# Patient Record
Sex: FEMALE | Race: WHITE | Employment: FULL TIME | URBAN - METROPOLITAN AREA
[De-identification: names, ages, dates, MRNs, and addresses within clinical notes are randomized per-mention and may not be internally consistent; named-entity substitution may affect disease eponyms.]

---

## 2017-01-19 ENCOUNTER — ANESTHESIA EVENT (OUTPATIENT)
Dept: OPERATING ROOM | Age: 72
End: 2017-01-19
Payer: COMMERCIAL

## 2017-01-20 ENCOUNTER — HOSPITAL ENCOUNTER (OUTPATIENT)
Dept: PREADMISSION TESTING | Age: 72
Discharge: HOME | End: 2017-01-20
Payer: COMMERCIAL

## 2017-01-20 VITALS
BODY MASS INDEX: 22.2 KG/M2 | DIASTOLIC BLOOD PRESSURE: 63 MMHG | OXYGEN SATURATION: 98 % | SYSTOLIC BLOOD PRESSURE: 113 MMHG | TEMPERATURE: 99.4 F | RESPIRATION RATE: 16 BRPM | HEIGHT: 64 IN | HEART RATE: 70 BPM | WEIGHT: 130 LBS

## 2017-01-20 DIAGNOSIS — M20.42 HAMMER TOE OF LEFT FOOT: Chronic | ICD-10-CM

## 2017-01-20 PROBLEM — J44.9 COPD (CHRONIC OBSTRUCTIVE PULMONARY DISEASE) (HCC): Chronic | Status: ACTIVE | Noted: 2017-01-20

## 2017-01-20 PROBLEM — M20.40 HAMMERTOE: Chronic | Status: ACTIVE | Noted: 2017-01-20

## 2017-01-20 PROCEDURE — 93005 ELECTROCARDIOGRAM TRACING: CPT

## 2017-01-20 RX ORDER — SODIUM CHLORIDE 0.9 % (FLUSH) 0.9 %
10 SYRINGE (ML) INJECTION PRN
Status: CANCELLED | OUTPATIENT
Start: 2017-01-20

## 2017-01-20 RX ORDER — SODIUM CHLORIDE, SODIUM LACTATE, POTASSIUM CHLORIDE, CALCIUM CHLORIDE 600; 310; 30; 20 MG/100ML; MG/100ML; MG/100ML; MG/100ML
INJECTION, SOLUTION INTRAVENOUS CONTINUOUS
Status: CANCELLED | OUTPATIENT
Start: 2017-01-20

## 2017-01-20 RX ORDER — ALBUTEROL SULFATE 90 UG/1
2 AEROSOL, METERED RESPIRATORY (INHALATION) EVERY 6 HOURS PRN
COMMUNITY

## 2017-01-20 RX ORDER — SENNOSIDES 8.6 MG
650 CAPSULE ORAL EVERY 8 HOURS PRN
COMMUNITY

## 2017-01-20 RX ORDER — LIDOCAINE HYDROCHLORIDE 10 MG/ML
1 INJECTION, SOLUTION EPIDURAL; INFILTRATION; INTRACAUDAL; PERINEURAL
Status: CANCELLED | OUTPATIENT
Start: 2017-01-20 | End: 2017-01-20

## 2017-01-20 RX ORDER — AMLODIPINE BESYLATE 5 MG/1
5 TABLET ORAL
COMMUNITY

## 2017-01-20 RX ORDER — ALPRAZOLAM 0.5 MG/1
0.5 TABLET ORAL DAILY
COMMUNITY

## 2017-01-20 RX ORDER — ATORVASTATIN CALCIUM 10 MG/1
10 TABLET, FILM COATED ORAL
COMMUNITY

## 2017-01-20 RX ORDER — RANITIDINE 300 MG/1
300 CAPSULE ORAL EVERY EVENING
COMMUNITY

## 2017-01-20 RX ORDER — PANTOPRAZOLE SODIUM 40 MG/10ML
40 INJECTION, POWDER, LYOPHILIZED, FOR SOLUTION INTRAVENOUS
COMMUNITY
End: 2017-01-20

## 2017-01-20 RX ORDER — SODIUM CHLORIDE 0.9 % (FLUSH) 0.9 %
10 SYRINGE (ML) INJECTION EVERY 12 HOURS SCHEDULED
Status: CANCELLED | OUTPATIENT
Start: 2017-01-20

## 2017-01-20 ASSESSMENT — ENCOUNTER SYMPTOMS
CONSTIPATION: 0
DIARRHEA: 0
SHORTNESS OF BREATH: 1
BACK PAIN: 0
SORE THROAT: 0
NAUSEA: 0
VOMITING: 0
COUGH: 0
HEARTBURN: 1
BLURRED VISION: 0
DOUBLE VISION: 0
WHEEZING: 0

## 2017-01-25 LAB
EKG ATRIAL RATE: 69 BPM
EKG P AXIS: 73 DEGREES
EKG P-R INTERVAL: 152 MS
EKG Q-T INTERVAL: 432 MS
EKG QRS DURATION: 76 MS
EKG QTC CALCULATION (BAZETT): 462 MS
EKG R AXIS: 3 DEGREES
EKG T AXIS: 41 DEGREES
EKG VENTRICULAR RATE: 69 BPM

## 2017-01-27 ENCOUNTER — ANESTHESIA (OUTPATIENT)
Dept: OPERATING ROOM | Age: 72
End: 2017-01-27
Payer: COMMERCIAL

## 2017-01-27 ENCOUNTER — HOSPITAL ENCOUNTER (OUTPATIENT)
Age: 72
Setting detail: OUTPATIENT SURGERY
Discharge: HOME | End: 2017-01-27
Attending: PODIATRIST | Admitting: PODIATRIST
Payer: COMMERCIAL

## 2017-01-27 VITALS
DIASTOLIC BLOOD PRESSURE: 76 MMHG | TEMPERATURE: 98.1 F | RESPIRATION RATE: 16 BRPM | SYSTOLIC BLOOD PRESSURE: 142 MMHG | OXYGEN SATURATION: 95 % | HEART RATE: 64 BPM

## 2017-01-27 VITALS
RESPIRATION RATE: 14 BRPM | OXYGEN SATURATION: 100 % | SYSTOLIC BLOOD PRESSURE: 84 MMHG | DIASTOLIC BLOOD PRESSURE: 52 MMHG

## 2017-01-27 PROCEDURE — 3600000014 HC SURGERY LEVEL 4 ADDTL 15MIN: Performed by: PODIATRIST

## 2017-01-27 PROCEDURE — 7100000001 HC PACU RECOVERY - ADDTL 15 MIN: Performed by: PODIATRIST

## 2017-01-27 PROCEDURE — 3700000001 HC ADD 15 MINUTES (ANESTHESIA): Performed by: PODIATRIST

## 2017-01-27 PROCEDURE — 3600000004 HC SURGERY LEVEL 4 BASE: Performed by: PODIATRIST

## 2017-01-27 PROCEDURE — 6360000002 HC RX W HCPCS: Performed by: PODIATRIST

## 2017-01-27 PROCEDURE — 7100000000 HC PACU RECOVERY - FIRST 15 MIN: Performed by: PODIATRIST

## 2017-01-27 PROCEDURE — 2580000003 HC RX 258: Performed by: PODIATRIST

## 2017-01-27 PROCEDURE — 7100000011 HC PHASE II RECOVERY - ADDTL 15 MIN: Performed by: PODIATRIST

## 2017-01-27 PROCEDURE — 7100000010 HC PHASE II RECOVERY - FIRST 15 MIN: Performed by: PODIATRIST

## 2017-01-27 PROCEDURE — 3700000000 HC ANESTHESIA ATTENDED CARE: Performed by: PODIATRIST

## 2017-01-27 PROCEDURE — 2500000003 HC RX 250 WO HCPCS: Performed by: PODIATRIST

## 2017-01-27 PROCEDURE — 6360000002 HC RX W HCPCS: Performed by: ANESTHESIOLOGY

## 2017-01-27 RX ORDER — FENTANYL CITRATE 50 UG/ML
50 INJECTION, SOLUTION INTRAMUSCULAR; INTRAVENOUS EVERY 10 MIN PRN
Status: DISCONTINUED | OUTPATIENT
Start: 2017-01-27 | End: 2017-01-27 | Stop reason: HOSPADM

## 2017-01-27 RX ORDER — ONDANSETRON 2 MG/ML
4 INJECTION INTRAMUSCULAR; INTRAVENOUS EVERY 6 HOURS PRN
Status: DISCONTINUED | OUTPATIENT
Start: 2017-01-27 | End: 2017-01-27 | Stop reason: HOSPADM

## 2017-01-27 RX ORDER — HYDROCODONE BITARTRATE AND ACETAMINOPHEN 5; 325 MG/1; MG/1
1 TABLET ORAL PRN
Status: DISCONTINUED | OUTPATIENT
Start: 2017-01-27 | End: 2017-01-27 | Stop reason: HOSPADM

## 2017-01-27 RX ORDER — LIDOCAINE HYDROCHLORIDE 10 MG/ML
1 INJECTION, SOLUTION EPIDURAL; INFILTRATION; INTRACAUDAL; PERINEURAL
Status: DISCONTINUED | OUTPATIENT
Start: 2017-01-27 | End: 2017-01-27 | Stop reason: HOSPADM

## 2017-01-27 RX ORDER — MAGNESIUM HYDROXIDE 1200 MG/15ML
LIQUID ORAL CONTINUOUS PRN
Status: DISCONTINUED | OUTPATIENT
Start: 2017-01-27 | End: 2017-01-27 | Stop reason: HOSPADM

## 2017-01-27 RX ORDER — SODIUM CHLORIDE 0.9 % (FLUSH) 0.9 %
10 SYRINGE (ML) INJECTION EVERY 12 HOURS SCHEDULED
Status: DISCONTINUED | OUTPATIENT
Start: 2017-01-27 | End: 2017-01-27 | Stop reason: HOSPADM

## 2017-01-27 RX ORDER — LIDOCAINE HYDROCHLORIDE 10 MG/ML
INJECTION, SOLUTION EPIDURAL; INFILTRATION; INTRACAUDAL; PERINEURAL PRN
Status: DISCONTINUED | OUTPATIENT
Start: 2017-01-27 | End: 2017-01-27 | Stop reason: HOSPADM

## 2017-01-27 RX ORDER — BUPIVACAINE HYDROCHLORIDE 5 MG/ML
INJECTION, SOLUTION EPIDURAL; INTRACAUDAL PRN
Status: DISCONTINUED | OUTPATIENT
Start: 2017-01-27 | End: 2017-01-27 | Stop reason: HOSPADM

## 2017-01-27 RX ORDER — HYDROCODONE BITARTRATE AND ACETAMINOPHEN 5; 325 MG/1; MG/1
1 TABLET ORAL EVERY 6 HOURS PRN
Qty: 30 TABLET | Refills: 0 | Status: SHIPPED | OUTPATIENT
Start: 2017-01-27 | End: 2017-02-03

## 2017-01-27 RX ORDER — SODIUM CHLORIDE 0.9 % (FLUSH) 0.9 %
10 SYRINGE (ML) INJECTION PRN
Status: DISCONTINUED | OUTPATIENT
Start: 2017-01-27 | End: 2017-01-27 | Stop reason: HOSPADM

## 2017-01-27 RX ORDER — MIDAZOLAM HYDROCHLORIDE 1 MG/ML
INJECTION INTRAMUSCULAR; INTRAVENOUS PRN
Status: DISCONTINUED | OUTPATIENT
Start: 2017-01-27 | End: 2017-01-27 | Stop reason: SDUPTHER

## 2017-01-27 RX ORDER — DIPHENHYDRAMINE HYDROCHLORIDE 50 MG/ML
12.5 INJECTION INTRAMUSCULAR; INTRAVENOUS
Status: DISCONTINUED | OUTPATIENT
Start: 2017-01-27 | End: 2017-01-27 | Stop reason: HOSPADM

## 2017-01-27 RX ORDER — PROPOFOL 10 MG/ML
INJECTION, EMULSION INTRAVENOUS CONTINUOUS PRN
Status: DISCONTINUED | OUTPATIENT
Start: 2017-01-27 | End: 2017-01-27 | Stop reason: SDUPTHER

## 2017-01-27 RX ORDER — MEPERIDINE HYDROCHLORIDE 50 MG/ML
12.5 INJECTION INTRAMUSCULAR; INTRAVENOUS; SUBCUTANEOUS EVERY 5 MIN PRN
Status: DISCONTINUED | OUTPATIENT
Start: 2017-01-27 | End: 2017-01-27 | Stop reason: HOSPADM

## 2017-01-27 RX ORDER — ACETAMINOPHEN 325 MG/1
650 TABLET ORAL EVERY 4 HOURS PRN
Status: DISCONTINUED | OUTPATIENT
Start: 2017-01-27 | End: 2017-01-27 | Stop reason: HOSPADM

## 2017-01-27 RX ORDER — HYDROMORPHONE HCL 110MG/55ML
0.5 PATIENT CONTROLLED ANALGESIA SYRINGE INTRAVENOUS EVERY 10 MIN PRN
Status: DISCONTINUED | OUTPATIENT
Start: 2017-01-27 | End: 2017-01-27 | Stop reason: HOSPADM

## 2017-01-27 RX ORDER — SODIUM CHLORIDE, SODIUM LACTATE, POTASSIUM CHLORIDE, CALCIUM CHLORIDE 600; 310; 30; 20 MG/100ML; MG/100ML; MG/100ML; MG/100ML
INJECTION, SOLUTION INTRAVENOUS CONTINUOUS
Status: DISCONTINUED | OUTPATIENT
Start: 2017-01-27 | End: 2017-01-27 | Stop reason: HOSPADM

## 2017-01-27 RX ORDER — METOCLOPRAMIDE HYDROCHLORIDE 5 MG/ML
10 INJECTION INTRAMUSCULAR; INTRAVENOUS
Status: DISCONTINUED | OUTPATIENT
Start: 2017-01-27 | End: 2017-01-27 | Stop reason: HOSPADM

## 2017-01-27 RX ORDER — ONDANSETRON 2 MG/ML
4 INJECTION INTRAMUSCULAR; INTRAVENOUS
Status: DISCONTINUED | OUTPATIENT
Start: 2017-01-27 | End: 2017-01-27 | Stop reason: HOSPADM

## 2017-01-27 RX ORDER — HYDROCODONE BITARTRATE AND ACETAMINOPHEN 5; 325 MG/1; MG/1
2 TABLET ORAL PRN
Status: DISCONTINUED | OUTPATIENT
Start: 2017-01-27 | End: 2017-01-27 | Stop reason: HOSPADM

## 2017-01-27 RX ORDER — DOCUSATE SODIUM 100 MG/1
100 CAPSULE, LIQUID FILLED ORAL 2 TIMES DAILY
Status: DISCONTINUED | OUTPATIENT
Start: 2017-01-27 | End: 2017-01-27 | Stop reason: HOSPADM

## 2017-01-27 RX ADMIN — FENTANYL CITRATE 25 MCG: 50 INJECTION, SOLUTION INTRAMUSCULAR; INTRAVENOUS at 07:34

## 2017-01-27 RX ADMIN — CEFAZOLIN 1 G: 1 INJECTION, POWDER, FOR SOLUTION INTRAMUSCULAR; INTRAVENOUS at 07:39

## 2017-01-27 RX ADMIN — PROPOFOL 100 MCG/KG/MIN: 10 INJECTION, EMULSION INTRAVENOUS at 07:34

## 2017-01-27 RX ADMIN — MIDAZOLAM HYDROCHLORIDE 2 MG: 1 INJECTION, SOLUTION INTRAMUSCULAR; INTRAVENOUS at 07:30

## 2019-12-30 LAB — SURGICAL PATHOLOGY REPORT: NORMAL

## 2023-10-19 DIAGNOSIS — M50.30 DEGENERATION, INTERVERTEBRAL DISC, CERVICAL: ICD-10-CM

## 2023-10-19 RX ORDER — ALENDRONATE SODIUM 70 MG/1
70 TABLET ORAL
COMMUNITY
Start: 2022-05-22 | End: 2023-10-19 | Stop reason: SDUPTHER

## 2023-10-19 NOTE — TELEPHONE ENCOUNTER
Patient request     Last ov 6/23     Please call and schedule pending appointment. Then, send to [facility] for approval

## 2023-10-20 RX ORDER — ALENDRONATE SODIUM 70 MG/1
70 TABLET ORAL
Qty: 12 TABLET | Refills: 1 | Status: SHIPPED | OUTPATIENT
Start: 2023-10-20 | End: 2023-10-25 | Stop reason: SDUPTHER

## 2023-10-23 PROBLEM — E87.6 HYPOKALEMIA: Status: ACTIVE | Noted: 2023-10-23

## 2023-10-23 PROBLEM — Z95.818 STATUS POST PLACEMENT OF IMPLANTABLE LOOP RECORDER: Status: ACTIVE | Noted: 2023-10-23

## 2023-10-23 PROBLEM — M51.36 LUMBAR DEGENERATIVE DISC DISEASE: Status: ACTIVE | Noted: 2023-10-23

## 2023-10-23 PROBLEM — F03.90 DEMENTIA (MULTI): Status: ACTIVE | Noted: 2023-10-23

## 2023-10-23 PROBLEM — I49.5 TACHYCARDIA-BRADYCARDIA SYNDROME (MULTI): Status: ACTIVE | Noted: 2023-10-23

## 2023-10-23 PROBLEM — E78.2 MIXED HYPERLIPIDEMIA: Status: ACTIVE | Noted: 2023-10-23

## 2023-10-23 PROBLEM — I10 BENIGN ESSENTIAL HYPERTENSION: Status: ACTIVE | Noted: 2023-10-23

## 2023-10-23 PROBLEM — R05.3 COUGH, PERSISTENT: Status: ACTIVE | Noted: 2023-10-23

## 2023-10-23 PROBLEM — M51.369 LUMBAR DEGENERATIVE DISC DISEASE: Status: ACTIVE | Noted: 2023-10-23

## 2023-10-23 PROBLEM — G45.9 TIA (TRANSIENT ISCHEMIC ATTACK): Status: ACTIVE | Noted: 2023-10-23

## 2023-10-23 PROBLEM — J44.9 COPD (CHRONIC OBSTRUCTIVE PULMONARY DISEASE) (MULTI): Status: ACTIVE | Noted: 2023-10-23

## 2023-10-23 PROBLEM — I48.0 PAROXYSMAL ATRIAL FIBRILLATION (MULTI): Status: ACTIVE | Noted: 2023-10-23

## 2023-10-23 PROBLEM — N32.81 OAB (OVERACTIVE BLADDER): Status: ACTIVE | Noted: 2023-10-23

## 2023-10-23 PROBLEM — G57.00 SCIATIC NEUROPATHY: Status: ACTIVE | Noted: 2023-10-23

## 2023-10-23 PROBLEM — K22.70 BARRETT ESOPHAGUS: Status: ACTIVE | Noted: 2023-10-23

## 2023-10-23 PROBLEM — J40 BRONCHITIS: Status: ACTIVE | Noted: 2023-10-23

## 2023-10-23 PROBLEM — M81.0 SENILE OSTEOPOROSIS: Status: ACTIVE | Noted: 2023-10-23

## 2023-10-23 PROBLEM — I25.10 CAD (CORONARY ARTERY DISEASE): Status: ACTIVE | Noted: 2023-10-23

## 2023-10-23 PROBLEM — I67.1 INTRACRANIAL ANEURYSM (HHS-HCC): Status: ACTIVE | Noted: 2023-10-23

## 2023-10-23 PROBLEM — R09.02 HYPOXIA: Status: ACTIVE | Noted: 2023-10-23

## 2023-10-23 PROBLEM — E55.9 VITAMIN D INSUFFICIENCY: Status: ACTIVE | Noted: 2023-10-23

## 2023-10-23 RX ORDER — AMOXICILLIN AND CLAVULANATE POTASSIUM 500; 125 MG/1; MG/1
500 TABLET, FILM COATED ORAL 2 TIMES DAILY
COMMUNITY
Start: 2023-07-25 | End: 2023-10-25 | Stop reason: WASHOUT

## 2023-10-23 RX ORDER — DEXTROMETHORPHAN HYDROBROMIDE, GUAIFENESIN 5; 100 MG/5ML; MG/5ML
2 LIQUID ORAL DAILY
COMMUNITY

## 2023-10-23 RX ORDER — ATORVASTATIN CALCIUM 40 MG/1
1 TABLET, FILM COATED ORAL DAILY
COMMUNITY
Start: 2021-07-19 | End: 2023-11-08 | Stop reason: SDUPTHER

## 2023-10-23 RX ORDER — ASPIRIN 81 MG/1
1 TABLET ORAL DAILY
COMMUNITY

## 2023-10-23 RX ORDER — IPRATROPIUM BROMIDE AND ALBUTEROL SULFATE 2.5; .5 MG/3ML; MG/3ML
3 SOLUTION RESPIRATORY (INHALATION) DAILY PRN
COMMUNITY

## 2023-10-23 RX ORDER — METOPROLOL TARTRATE 50 MG/1
25 TABLET ORAL 2 TIMES DAILY
COMMUNITY
Start: 2023-08-07 | End: 2024-04-10 | Stop reason: SDUPTHER

## 2023-10-23 RX ORDER — ISOSORBIDE MONONITRATE 30 MG/1
1 TABLET, EXTENDED RELEASE ORAL DAILY
COMMUNITY
Start: 2022-01-27

## 2023-10-23 RX ORDER — HYDROCHLOROTHIAZIDE 12.5 MG/1
1 CAPSULE ORAL DAILY
COMMUNITY

## 2023-10-23 RX ORDER — LANOLIN ALCOHOL/MO/W.PET/CERES
1 CREAM (GRAM) TOPICAL DAILY
COMMUNITY
Start: 2021-11-23

## 2023-10-23 RX ORDER — POTASSIUM CHLORIDE 750 MG/1
1 TABLET, EXTENDED RELEASE ORAL DAILY
COMMUNITY
Start: 2022-09-11

## 2023-10-23 RX ORDER — NITROGLYCERIN 0.4 MG/1
0.4 TABLET SUBLINGUAL EVERY 5 MIN PRN
COMMUNITY

## 2023-10-25 ENCOUNTER — OFFICE VISIT (OUTPATIENT)
Dept: PRIMARY CARE | Facility: CLINIC | Age: 78
End: 2023-10-25
Payer: MEDICARE

## 2023-10-25 VITALS
SYSTOLIC BLOOD PRESSURE: 134 MMHG | DIASTOLIC BLOOD PRESSURE: 84 MMHG | TEMPERATURE: 98.1 F | HEART RATE: 76 BPM | BODY MASS INDEX: 32.8 KG/M2 | HEIGHT: 62 IN | WEIGHT: 178.25 LBS

## 2023-10-25 DIAGNOSIS — J43.9 PULMONARY EMPHYSEMA, UNSPECIFIED EMPHYSEMA TYPE (MULTI): Primary | ICD-10-CM

## 2023-10-25 DIAGNOSIS — Z23 ENCOUNTER FOR IMMUNIZATION: ICD-10-CM

## 2023-10-25 DIAGNOSIS — M81.0 SENILE OSTEOPOROSIS: ICD-10-CM

## 2023-10-25 DIAGNOSIS — M50.30 DEGENERATION, INTERVERTEBRAL DISC, CERVICAL: ICD-10-CM

## 2023-10-25 PROCEDURE — 99214 OFFICE O/P EST MOD 30 MIN: CPT | Performed by: INTERNAL MEDICINE

## 2023-10-25 PROCEDURE — 3075F SYST BP GE 130 - 139MM HG: CPT | Performed by: INTERNAL MEDICINE

## 2023-10-25 PROCEDURE — 1159F MED LIST DOCD IN RCRD: CPT | Performed by: INTERNAL MEDICINE

## 2023-10-25 PROCEDURE — 90662 IIV NO PRSV INCREASED AG IM: CPT | Performed by: INTERNAL MEDICINE

## 2023-10-25 PROCEDURE — G0008 ADMIN INFLUENZA VIRUS VAC: HCPCS | Performed by: INTERNAL MEDICINE

## 2023-10-25 PROCEDURE — 3079F DIAST BP 80-89 MM HG: CPT | Performed by: INTERNAL MEDICINE

## 2023-10-25 PROCEDURE — 1126F AMNT PAIN NOTED NONE PRSNT: CPT | Performed by: INTERNAL MEDICINE

## 2023-10-25 PROCEDURE — 1036F TOBACCO NON-USER: CPT | Performed by: INTERNAL MEDICINE

## 2023-10-25 RX ORDER — AZITHROMYCIN 500 MG/1
500 TABLET, FILM COATED ORAL DAILY
Qty: 5 TABLET | Refills: 0 | Status: SHIPPED | OUTPATIENT
Start: 2023-10-25 | End: 2023-10-30

## 2023-10-25 RX ORDER — METHYLPREDNISOLONE 4 MG/1
TABLET ORAL
Qty: 21 TABLET | Refills: 0 | Status: SHIPPED | OUTPATIENT
Start: 2023-10-25 | End: 2024-01-08 | Stop reason: WASHOUT

## 2023-10-25 RX ORDER — ALENDRONATE SODIUM 70 MG/1
70 TABLET ORAL
Qty: 12 TABLET | Refills: 1 | Status: SHIPPED | OUTPATIENT
Start: 2023-10-25 | End: 2023-12-15 | Stop reason: SDUPTHER

## 2023-10-25 ASSESSMENT — ENCOUNTER SYMPTOMS
STRIDOR: 0
EYE REDNESS: 0
JOINT SWELLING: 0
FEVER: 0
PALPITATIONS: 0
HEMATURIA: 0
CHEST TIGHTNESS: 0
SPEECH DIFFICULTY: 0
BLOOD IN STOOL: 0
ACTIVITY CHANGE: 0
LIGHT-HEADEDNESS: 0

## 2023-10-25 ASSESSMENT — PATIENT HEALTH QUESTIONNAIRE - PHQ9
1. LITTLE INTEREST OR PLEASURE IN DOING THINGS: NOT AT ALL
SUM OF ALL RESPONSES TO PHQ9 QUESTIONS 1 AND 2: 0
2. FEELING DOWN, DEPRESSED OR HOPELESS: NOT AT ALL

## 2023-10-25 NOTE — PROGRESS NOTES
"In SUBJECTIVE:   Rae Lundy is a 78 y.o. female who presents for Med Management (Patient in office today for med management. ).    HISTORY OF PRESENT ILLNESS:  Rae Lundy  is a pleasant 78-year-old female with history of former smoking, COPD comes here with cough, congestion also somewhat wheezing.  She has been doing her home as well as neb treatment routinely twice a day.  She denies any fever or chills.  Recently her  has been sick who has been transferred to the hospital.  Otherwise she denies any chest pain or pressure.  Patient does have history of osteoporosis.  She needs her medications refilled.  Her cough does not make any sputum.  Patient had multiple previous hospitalization is prolonged cough and COPD exacerbation.  She is due for annual flu shot.            Review of Systems   Constitutional:  Negative for activity change and fever.   HENT:  Negative for hearing loss, nosebleeds and tinnitus.    Eyes:  Negative for redness.   Respiratory:  Negative for chest tightness and stridor.    Cardiovascular:  Negative for chest pain, palpitations and leg swelling.   Gastrointestinal:  Negative for blood in stool.   Endocrine: Negative for cold intolerance.   Genitourinary:  Negative for hematuria.   Musculoskeletal:  Negative for joint swelling.   Skin:  Negative for rash.   Neurological:  Negative for speech difficulty and light-headedness.   Psychiatric/Behavioral:  Negative for behavioral problems.        Visit Vitals  /84 (BP Location: Left arm, Patient Position: Sitting)   Pulse 76   Temp 36.7 °C (98.1 °F) (Temporal)   Ht 1.575 m (5' 2\")   Wt 80.9 kg (178 lb 4 oz)   BMI 32.60 kg/m²   Smoking Status Former   BSA 1.88 m²             Wt Readings from Last 10 Encounters:   10/25/23 80.9 kg (178 lb 4 oz)   07/25/23 79.4 kg (175 lb)   06/01/23 75.3 kg (166 lb)   03/07/23 75.8 kg (167 lb)   03/02/23 75.8 kg (167 lb)   02/16/23 72.4 kg (159 lb 9.6 oz)   10/12/22 73.5 kg (162 lb)   09/07/22 72.1 " kg (159 lb)   03/04/22 74.4 kg (164 lb)   12/16/21 72.1 kg (159 lb)            Physical Exam  Constitutional:       General: She is not in acute distress.     Appearance: Normal appearance.   HENT:      Head: Normocephalic.      Right Ear: Tympanic membrane normal.      Left Ear: Tympanic membrane normal.      Mouth/Throat:      Mouth: Mucous membranes are moist.   Cardiovascular:      Rate and Rhythm: Normal rate and regular rhythm.      Heart sounds: No murmur heard.  Pulmonary:      Effort: No respiratory distress.   Abdominal:      Palpations: Abdomen is soft.   Musculoskeletal:      Cervical back: Neck supple.      Right lower leg: No edema.      Left lower leg: No edema.   Skin:     Findings: No rash.   Neurological:      General: No focal deficit present.      Mental Status: She is alert and oriented to person, place, and time.   Psychiatric:         Mood and Affect: Mood normal.            RECENT LABS:  Lab Results   Component Value Date    WBC 12.1 (H) 05/23/2023    HGB 12.4 05/23/2023    HCT 38.0 05/23/2023     05/23/2023    CHOL 143 11/04/2022    TRIG 97 11/04/2022    HDL 56.0 11/04/2022    ALT 19 05/21/2023    AST 22 05/21/2023     (L) 05/24/2023    K 3.9 05/24/2023    CL 98 05/24/2023    CREATININE 0.71 05/24/2023    BUN 19 05/24/2023    CO2 27 05/24/2023    TSH 2.63 11/04/2022    INR 1.0 02/04/2023    HGBA1C 6.4 (H) 02/23/2022       MEDICATIONS:   Current Outpatient Medications   Medication Instructions    acetaminophen (Tylenol 8 HOUR) 650 mg ER tablet 2 tablets, oral, Daily    alendronate (FOSAMAX) 70 mg, oral, Weekly    aspirin 81 mg EC tablet 1 tablet, oral, Daily    atorvastatin (Lipitor) 40 mg tablet 1 tablet, oral, Daily    azithromycin (ZITHROMAX) 500 mg, oral, Daily    hydroCHLOROthiazide (Microzide) 12.5 mg capsule 1 capsule, oral, Daily    ipratropium-albuteroL (Duo-Neb) 0.5-2.5 mg/3 mL nebulizer solution 3 mL, inhalation, 3 times daily RT    isosorbide mononitrate ER (Imdur) 30  mg 24 hr tablet 1 tablet, oral, Daily    magnesium oxide (Mag-Ox) 400 mg (241.3 mg magnesium) tablet 1 tablet, oral, Daily    methylPREDNISolone (Medrol Dospak) 4 mg tablets Take as directed on package.    metoprolol tartrate (LOPRESSOR) 25 mg, oral, 2 times daily    multivit-min/ferrous fumarate (MULTI VITAMIN ORAL) 1 tablet, oral, Daily    nitroglycerin (NITROSTAT) 0.4 mg, sublingual, Every 5 min PRN    oxygen (O2) 3 L/min, inhalation, Nightly    potassium chloride CR 10 mEq ER tablet 1 tablet, oral, Daily        TODAY'S VISIT  DX:   1. Pulmonary emphysema, unspecified emphysema type (CMS/HCC)  azithromycin (Zithromax) 500 mg tablet    methylPREDNISolone (Medrol Dospak) 4 mg tablets      2. Encounter for immunization  Flu vaccine, quadrivalent, high-dose, preservative free, age 65y+ (FLUZONE)      3. Degeneration, intervertebral disc, cervical  alendronate (Fosamax) 70 mg tablet      4. Senile osteoporosis  alendronate (Fosamax) 70 mg tablet           MEDICAL DECISION MAKING:  A. Recent lab work and relevant imaging studies have been reviewed.    B. Relevant correspondence/notes from specialty consultants were reviewed and discussed with patient.    C. The current active medical co morbidities have been considered.   D. Medications have been sent for refill.  I started her on Medrol Dosepak and azithromycin.  E. Patient will continue with current medical therapy and plan.   F. I will see patient back in 3 months.

## 2023-11-08 DIAGNOSIS — E78.2 MIXED HYPERLIPIDEMIA: Primary | ICD-10-CM

## 2023-11-08 RX ORDER — ATORVASTATIN CALCIUM 40 MG/1
40 TABLET, FILM COATED ORAL DAILY
Qty: 90 TABLET | Refills: 1 | Status: SHIPPED | OUTPATIENT
Start: 2023-11-08 | End: 2024-02-06 | Stop reason: HOSPADM

## 2023-11-16 ENCOUNTER — HOSPITAL ENCOUNTER (EMERGENCY)
Facility: HOSPITAL | Age: 78
Discharge: HOME | End: 2023-11-16
Payer: MEDICARE

## 2023-11-16 ENCOUNTER — APPOINTMENT (OUTPATIENT)
Dept: RADIOLOGY | Facility: HOSPITAL | Age: 78
End: 2023-11-16
Payer: MEDICARE

## 2023-11-16 VITALS
BODY MASS INDEX: 25.59 KG/M2 | TEMPERATURE: 96.8 F | RESPIRATION RATE: 16 BRPM | DIASTOLIC BLOOD PRESSURE: 104 MMHG | OXYGEN SATURATION: 93 % | SYSTOLIC BLOOD PRESSURE: 144 MMHG | HEIGHT: 64 IN | WEIGHT: 149.91 LBS | HEART RATE: 78 BPM
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
## 2023-11-16 DIAGNOSIS — M25.561 ACUTE PAIN OF RIGHT KNEE: Primary | ICD-10-CM

## 2023-11-16 PROCEDURE — 99285 EMERGENCY DEPT VISIT HI MDM: CPT | Mod: 25

## 2023-11-16 PROCEDURE — 73564 X-RAY EXAM KNEE 4 OR MORE: CPT | Mod: RIGHT SIDE | Performed by: RADIOLOGY

## 2023-11-16 PROCEDURE — 99283 EMERGENCY DEPT VISIT LOW MDM: CPT | Mod: 25

## 2023-11-16 PROCEDURE — 73564 X-RAY EXAM KNEE 4 OR MORE: CPT | Mod: RT

## 2023-11-16 RX ORDER — IBUPROFEN 600 MG/1
600 TABLET ORAL EVERY 6 HOURS PRN
Qty: 20 TABLET | Refills: 0 | Status: SHIPPED | OUTPATIENT
Start: 2023-11-16 | End: 2024-03-24 | Stop reason: HOSPADM

## 2023-11-16 RX ORDER — ACETAMINOPHEN 325 MG/1
650 TABLET ORAL ONCE
Status: COMPLETED | OUTPATIENT
Start: 2023-11-16 | End: 2023-11-16

## 2023-11-16 RX ADMIN — ACETAMINOPHEN 650 MG: 325 TABLET ORAL at 13:21

## 2023-11-16 ASSESSMENT — LIFESTYLE VARIABLES
EVER FELT BAD OR GUILTY ABOUT YOUR DRINKING: NO
HAVE PEOPLE ANNOYED YOU BY CRITICIZING YOUR DRINKING: NO
EVER HAD A DRINK FIRST THING IN THE MORNING TO STEADY YOUR NERVES TO GET RID OF A HANGOVER: NO
HAVE YOU EVER FELT YOU SHOULD CUT DOWN ON YOUR DRINKING: NO
REASON UNABLE TO ASSESS: NO

## 2023-11-16 ASSESSMENT — COLUMBIA-SUICIDE SEVERITY RATING SCALE - C-SSRS
6. HAVE YOU EVER DONE ANYTHING, STARTED TO DO ANYTHING, OR PREPARED TO DO ANYTHING TO END YOUR LIFE?: NO
2. HAVE YOU ACTUALLY HAD ANY THOUGHTS OF KILLING YOURSELF?: NO
1. IN THE PAST MONTH, HAVE YOU WISHED YOU WERE DEAD OR WISHED YOU COULD GO TO SLEEP AND NOT WAKE UP?: NO

## 2023-11-16 ASSESSMENT — PAIN DESCRIPTION - DESCRIPTORS: DESCRIPTORS: ACHING;THROBBING

## 2023-11-16 ASSESSMENT — PAIN SCALES - GENERAL
PAINLEVEL_OUTOF10: 9
PAINLEVEL_OUTOF10: 3
PAINLEVEL_OUTOF10: 6

## 2023-11-16 ASSESSMENT — PAIN - FUNCTIONAL ASSESSMENT
PAIN_FUNCTIONAL_ASSESSMENT: 0-10
PAIN_FUNCTIONAL_ASSESSMENT: 0-10

## 2023-11-16 NOTE — ED PROVIDER NOTES
HPI   Chief Complaint   Patient presents with    Knee Pain     Fell this morning and hit right knee on kitchen chair       78-year-old female presents emergency department for evaluation of right knee pain.  Patient tells me that she tripped over a throw rug this morning and landed on her right knee.  Patient tells me that she does need a knee replacement in this knee.  Patient tells me that she is having 8/10 pain with ambulation.  Patient characterizes the pain as sharp.  Patient tells me that she did drive herself today.  Patient denies taking any over-the-counter medications for pain relief prior to arrival.  Patient tells me that she did put on a knee brace.  Patient denies any chest pain, dizziness or lightheadedness, shortness of breath, fever chills, abdominal pain, nausea vomiting, headache, urinary symptoms, or weakness.      History provided by:  Patient   used: No                        No data recorded                Patient History   Past Medical History:   Diagnosis Date    Acute candidiasis of vulva and vagina 10/22/2018    Yeast infection of the vagina    Anxiety disorder, unspecified     Anxiety and depression    Body mass index (BMI) 29.0-29.9, adult 03/04/2022    BMI 29.0-29.9,adult    Bunion of left foot     Bunion, left    Candidiasis of skin and nail 10/22/2018    Yeast infection of the skin    COVID-19 04/25/2022    COVID-19 virus infection    Gastro-esophageal reflux disease without esophagitis     Chronic GERD    Nontoxic multinodular goiter     Multinodular goiter    Overweight 03/04/2022    Overweight    Personal history of other diseases of the digestive system 01/17/2019    History of gastroesophageal reflux (GERD)    Personal history of other diseases of the musculoskeletal system and connective tissue     History of osteopenia    Personal history of other diseases of the nervous system and sense organs     History of hearing loss    Personal history of other  endocrine, nutritional and metabolic disease     History of hyperlipidemia    Personal history of other infectious and parasitic diseases     History of herpes zoster    Pneumonia, unspecified organism     Community acquired pneumonia    Spondylosis without myelopathy or radiculopathy, cervical region     Osteoarthritis of cervical spine     Past Surgical History:   Procedure Laterality Date    BLADDER SURGERY      BREAST LUMPECTOMY  09/28/2017    Left Breast Lumpectomy    CHOLECYSTECTOMY  09/28/2017    Cholecystectomy    CT HEAD ANGIO W AND WO IV CONTRAST  04/10/2019    CT HEAD ANGIO W AND WO IV CONTRAST 4/10/2019 [facility]    KNEE ARTHROSCOPY W/ DEBRIDEMENT  09/28/2017    Arthroscopy Knee Right    MR HEAD ANGIO WO IV CONTRAST  09/05/2019    MR HEAD ANGIO WO IV CONTRAST 9/5/2019 [facility]    MR HEAD ANGIO WO IV CONTRAST  01/19/2020    MR HEAD ANGIO WO IV CONTRAST 1/19/2020 [facility]    MR NECK ANGIO WO IV CONTRAST  01/19/2020    MR NECK ANGIO WO IV CONTRAST 1/19/2020 [facility]    OTHER SURGICAL HISTORY  09/28/2017    Surgery Foot Amputation Metatarsal And Toe    OTHER SURGICAL HISTORY  09/07/2022    Loop recorder insertion    OTHER SURGICAL HISTORY  11/14/2021    Thyroid surgery    OTHER SURGICAL HISTORY  11/14/2021    Complete colonoscopy    OTHER SURGICAL HISTORY  11/14/2021    Knee surgery     Family History   Problem Relation Name Age of Onset    Arthritis Mother      Heart failure Mother      Esophageal cancer Mother      Rheumatic fever Mother      Other (heart problem) Mother      Esophageal cancer Father      Other (cardiac disorder) Son       Social History     Tobacco Use    Smoking status: Former     Types: Cigarettes    Smokeless tobacco: Former   Substance Use Topics    Alcohol use: Never    Drug use: Never       Physical Exam   ED Triage Vitals [11/16/23 1159]   Temp Heart Rate Resp BP   36 °C (96.8 °F) 90 18 172/76      SpO2 Temp Source Heart Rate Source  Patient Position   93 % Temporal Monitor --      BP Location FiO2 (%)     Right arm --       Physical Exam  Vitals and nursing note reviewed.   Constitutional:       Appearance: Normal appearance.   Cardiovascular:      Rate and Rhythm: Normal rate and regular rhythm.   Pulmonary:      Effort: Pulmonary effort is normal.   Musculoskeletal:         General: Swelling present. Normal range of motion.      Right knee: Swelling and bony tenderness present.      Left knee: Normal.   Skin:     Capillary Refill: Capillary refill takes less than 2 seconds.   Neurological:      Mental Status: She is alert.   Psychiatric:         Mood and Affect: Mood normal.         Behavior: Behavior normal.         ED Course & MDM   Diagnoses as of 11/16/23 1517   Acute pain of right knee   Fall, initial encounter       Medical Decision Making  78-year-old female presents emergency department for evaluation of right knee pain.  Patient tells me that she tripped over a throw rug this morning and landed on her right knee.  Patient tells me that she does need a knee replacement in this knee.  Patient tells me that she is having 8/10 pain with ambulation.  Patient characterizes the pain as sharp.  Patient tells me that she did drive herself today.  Patient denies taking any over-the-counter medications for pain relief prior to arrival.  Patient tells me that she did put on a knee brace.  Patient denies any chest pain, dizziness or lightheadedness, shortness of breath, fever chills, abdominal pain, nausea vomiting, headache, urinary symptoms, or weakness.    Patient seen examined emergency department; patient is healthy and nontoxic in appearance not appear in any acute distress.  Clinical exam significant with pain to palpation in the medial edge of the right knee and also above and below the kneecap.  Patient tells me that she is able to bear weight, patient is able to flex and extend knee without difficulty.  MSPs intact distally.  Will  obtain imaging of the right knee and treat patient's pain with acetaminophen while in the emergency department today.    Imaging right knee without any acute fracture or dislocation.  There is also no effusion noted by radiology.  However there is degenerative changes.  Patient I discussed findings.  Patient tells me that her regular orthopedic is Dr. Stone.  I did encourage her to call Dr. Stone's office when things are more controlled at home to schedule an appointment to initiate the process of having her right knee replaced.  Patient educated on the use of RICE while at home for symptom management.  Patient verbalized understanding.  Patient discharged home in stable condition.        Procedure  Procedures     Kenna Santiago, RUSTY-CNP  11/16/23 152

## 2023-12-15 ENCOUNTER — OFFICE VISIT (OUTPATIENT)
Dept: ORTHOPEDIC SURGERY | Facility: CLINIC | Age: 78
End: 2023-12-15
Payer: MEDICARE

## 2023-12-15 DIAGNOSIS — M17.11 PRIMARY OSTEOARTHRITIS OF RIGHT KNEE: Primary | ICD-10-CM

## 2023-12-15 DIAGNOSIS — M50.30 DEGENERATION, INTERVERTEBRAL DISC, CERVICAL: ICD-10-CM

## 2023-12-15 DIAGNOSIS — M81.0 SENILE OSTEOPOROSIS: ICD-10-CM

## 2023-12-15 PROCEDURE — 1159F MED LIST DOCD IN RCRD: CPT | Performed by: ORTHOPAEDIC SURGERY

## 2023-12-15 PROCEDURE — 20610 DRAIN/INJ JOINT/BURSA W/O US: CPT | Mod: RT | Performed by: ORTHOPAEDIC SURGERY

## 2023-12-15 PROCEDURE — 1126F AMNT PAIN NOTED NONE PRSNT: CPT | Performed by: ORTHOPAEDIC SURGERY

## 2023-12-15 PROCEDURE — 99214 OFFICE O/P EST MOD 30 MIN: CPT | Performed by: ORTHOPAEDIC SURGERY

## 2023-12-15 PROCEDURE — 2500000004 HC RX 250 GENERAL PHARMACY W/ HCPCS (ALT 636 FOR OP/ED): Performed by: ORTHOPAEDIC SURGERY

## 2023-12-15 PROCEDURE — 2500000005 HC RX 250 GENERAL PHARMACY W/O HCPCS: Performed by: ORTHOPAEDIC SURGERY

## 2023-12-15 PROCEDURE — 1036F TOBACCO NON-USER: CPT | Performed by: ORTHOPAEDIC SURGERY

## 2023-12-15 RX ORDER — BETAMETHASONE SODIUM PHOSPHATE AND BETAMETHASONE ACETATE 3; 3 MG/ML; MG/ML
2 INJECTION, SUSPENSION INTRA-ARTICULAR; INTRALESIONAL; INTRAMUSCULAR; SOFT TISSUE
Status: COMPLETED | OUTPATIENT
Start: 2023-12-15 | End: 2023-12-15

## 2023-12-15 RX ORDER — LIDOCAINE HYDROCHLORIDE 10 MG/ML
4 INJECTION INFILTRATION; PERINEURAL
Status: COMPLETED | OUTPATIENT
Start: 2023-12-15 | End: 2023-12-15

## 2023-12-15 RX ADMIN — LIDOCAINE HYDROCHLORIDE 4 ML: 10 INJECTION, SOLUTION INFILTRATION; PERINEURAL at 10:39

## 2023-12-15 RX ADMIN — BETAMETHASONE ACETATE AND BETAMETHASONE SODIUM PHOSPHATE 2 ML: 3; 3 INJECTION, SUSPENSION INTRA-ARTICULAR; INTRALESIONAL; INTRAMUSCULAR; SOFT TISSUE at 10:39

## 2023-12-15 NOTE — PROGRESS NOTES
History of present illness    78-year-old female well-known to me chronic right-sided knee pain now she has significant knee arthritis knee pain is getting progressively worse causing to her severe impairment she is having difficulty with her activities of daily living using just a cane for assistive ice recently was in the emergency room she states that she is gotten to the point now where she is interested in knee replacement surgery we discussed surgical nonsurgical options she is having severe impairment      Past medical , Surgical, Family and social history reviewed.      Physical exam  General: No acute distress and breathing comfortably.  Patient is pleasant and cooperative with the examination.    Extremity  The affected knee was examined and inspected and was tender to touch along the medial aspect.  The patient has catching/locking and occasional mechanical symptoms.  The skin was intact without breakdown or open wounds.  There was a mild Herminia exam seen with mild evidence of instability and weakness in the collateral ligaments with laxity to varus valgus stress and in the anterior posterior plane.  There was a negative Lachman's test, pivot shift test, and posterior drawer sign.  There was no foot drop, numbness or tingling.  Sensation, reflexes, and pulses in the foot and ankle were present.  There was an effusion but range of motion was good and straight leg raise testing was normal.   The patient had the ability to bear weight but with discomfort.  The patient's gait was antalgic secondary to the discomfort. Knee range of motion was     Diagnostics      XR knee right 4+ views    Result Date: 11/16/2023  Interpreted By:  Partha Parra, STUDY: XR KNEE RIGHT 4+ VIEWS;  11/16/2023 1:50 pm   INDICATION: Signs/Symptoms:pain and swelling.   COMPARISON: None.   ACCESSION NUMBER(S): [id number]   ORDERING CLINICIAN: LOIS RAINEY   FINDINGS: Multiple views of the right  knee are obtained. Tricompartmental joint space loss with tricompartmental osteophytes. No erosions. No acute fracture-dislocation.         No acute fracture or dislocation. Advanced tricompartmental degenerative changes of the right knee.   Signed by: Partha Parra 11/16/2023 2:51 PM Dictation workstation:   [id number]       Procedure  Before aspiration/injection, the risks  of this procedure including but not limited to;  infection, local skin irritation, skin atrophy, calcification, continued pain or discomfort, elevated blood sugar, burning, failure to relieve pain, possible late infection were all discussed with the patient.  The patient verbalized understanding and consented to the procedure.   After informed consent was provided, patient identification was confirmed, and allergies were verified, the patient was appropriately positioned. The site was marked and time-out performed.  The injection site was prepped in the usual sterile manner to provide a sterile environment. The skin was anesthetized with ethyl chloride spray. The aspiration/injection was performed with standard technique. The needle was withdrawn and the puncture site was secured with a Band-Aid. The patient tolerated the procedure well without complication.   Post-procedure discomfort can be alleviated with additional medication, ice, elevation, and rest over the first 24 hours as recommended.  The injection was right knee 2 cc celestone 4cc lidocaine    Assessment  Right knee arthritis    Treatment plan  1.  The natural history of the condition and its associated treatment alternatives including surgical and nonsurgical options were discussed with the patient at length.  2.  Patient with severe impairment related to right knee arthritis we discussed surgical nonsurgical options she like to consider right total knee replacement in the meantime she like to try cortisone injection which was performed today with her consent without  complication.  Patient understands the cortisone may provide temporary relief how much it helps her how long it lasts is unpredictable.  Cortisone can be repeated after 3 months if symptoms return.  3.  Patient has failed all forms of conservative treatment.  The joint pain is significantly affecting quality of life and activities of daily living.    The patient has pain in the joint that is increased with activity and weightbearing, and walks with an antalgic gait.  The pain is interfering with activities of daily living.  Patient has limited range of motion and pain with passive range of motion.  X-rays demonstrate joint space narrowing, subchondral sclerosis and osteophyte formation.  Symptoms are not improving with medication, physical therapy or supportive device for a period of at least 3 months.      Patient would like to go and schedule joint replacement surgery.  The procedure its risks, benefits, and treatment alternatives were discussed at length and patient would like to proceed.  Patient is going to schedule the procedure at their earliest convenience and see me back for a preop visit just prior.  Preadmission testing, medical checkup, and insurance authorization will be performed in the meantime.  Patient understands a joint replacement surgery is a last resort, although a very successful operation results are not guaranteed.  4.  All of the patient's questions were answered.    This note was prepared using voice recognition software.  The details of this note are correct and have been reviewed, and corrected to the best of my ability.  Some grammatical areas may persist related to the Dragon software    Krunal Stone MD  Senior Attending Physician  [facility]  [facility] [place]    [phone]  L Inj/Asp: R knee on 12/15/2023 10:39 AM  Indications: pain and diagnostic evaluation  Details: 22 G needle, anteromedial approach  Medications: 4 mL lidocaine 10 mg/mL (1 %); 2 mL  betamethasone acet,sod phos 6 mg/mL  Outcome: tolerated well, no immediate complications  Procedure, treatment alternatives, risks and benefits explained, specific risks discussed. Consent was given by the patient. Immediately prior to procedure a time out was called to verify the correct patient, procedure, equipment, support staff and site/side marked as required. Patient was prepped and draped in the usual sterile fashion.

## 2023-12-16 RX ORDER — ALENDRONATE SODIUM 70 MG/1
70 TABLET ORAL
Qty: 12 TABLET | Refills: 1 | Status: ON HOLD | OUTPATIENT
Start: 2023-12-16 | End: 2024-02-05 | Stop reason: SDUPTHER

## 2024-01-23 ENCOUNTER — APPOINTMENT (OUTPATIENT)
Dept: CARDIOLOGY | Facility: CLINIC | Age: 79
End: 2024-01-23
Payer: MEDICARE

## 2024-02-02 ENCOUNTER — HOSPITAL ENCOUNTER (INPATIENT)
Facility: HOSPITAL | Age: 79
LOS: 4 days | Discharge: HOME | DRG: 191 | End: 2024-02-06
Attending: EMERGENCY MEDICINE | Admitting: INTERNAL MEDICINE
Payer: MEDICARE

## 2024-02-02 ENCOUNTER — APPOINTMENT (OUTPATIENT)
Dept: RADIOLOGY | Facility: HOSPITAL | Age: 79
DRG: 191 | End: 2024-02-02
Payer: MEDICARE

## 2024-02-02 ENCOUNTER — APPOINTMENT (OUTPATIENT)
Dept: CARDIOLOGY | Facility: HOSPITAL | Age: 79
DRG: 191 | End: 2024-02-02
Payer: MEDICARE

## 2024-02-02 DIAGNOSIS — F02.80 ALZHEIMER'S DEMENTIA WITHOUT BEHAVIORAL DISTURBANCE, PSYCHOTIC DISTURBANCE, MOOD DISTURBANCE, OR ANXIETY, UNSPECIFIED DEMENTIA SEVERITY, UNSPECIFIED TIMING OF DEMENTIA ONSET (MULTI): ICD-10-CM

## 2024-02-02 DIAGNOSIS — G30.9 ALZHEIMER'S DEMENTIA WITHOUT BEHAVIORAL DISTURBANCE, PSYCHOTIC DISTURBANCE, MOOD DISTURBANCE, OR ANXIETY, UNSPECIFIED DEMENTIA SEVERITY, UNSPECIFIED TIMING OF DEMENTIA ONSET (MULTI): ICD-10-CM

## 2024-02-02 DIAGNOSIS — K59.09 OTHER CONSTIPATION: ICD-10-CM

## 2024-02-02 DIAGNOSIS — F43.21 GRIEF: ICD-10-CM

## 2024-02-02 DIAGNOSIS — M17.11 PRIMARY OSTEOARTHRITIS OF RIGHT KNEE: ICD-10-CM

## 2024-02-02 DIAGNOSIS — R09.02 HYPOXIA: ICD-10-CM

## 2024-02-02 DIAGNOSIS — J44.1 COPD EXACERBATION (MULTI): Primary | ICD-10-CM

## 2024-02-02 DIAGNOSIS — M81.0 SENILE OSTEOPOROSIS: ICD-10-CM

## 2024-02-02 DIAGNOSIS — M50.30 DEGENERATION, INTERVERTEBRAL DISC, CERVICAL: ICD-10-CM

## 2024-02-02 PROBLEM — I48.0 PAROXYSMAL A-FIB (MULTI): Status: ACTIVE | Noted: 2024-02-02

## 2024-02-02 PROBLEM — K59.00 CONSTIPATION: Status: ACTIVE | Noted: 2024-02-02

## 2024-02-02 LAB
ALBUMIN SERPL BCP-MCNC: 4.2 G/DL (ref 3.4–5)
ALP SERPL-CCNC: 112 U/L (ref 33–136)
ALT SERPL W P-5'-P-CCNC: 18 U/L (ref 7–45)
ANION GAP SERPL CALC-SCNC: 15 MMOL/L (ref 10–20)
AST SERPL W P-5'-P-CCNC: 22 U/L (ref 9–39)
ATRIAL RATE: 73 BPM
BASOPHILS # BLD AUTO: 0.03 X10*3/UL (ref 0–0.1)
BASOPHILS NFR BLD AUTO: 0.5 %
BILIRUB SERPL-MCNC: 0.5 MG/DL (ref 0–1.2)
BNP SERPL-MCNC: 71 PG/ML (ref 0–99)
BUN SERPL-MCNC: 10 MG/DL (ref 6–23)
CALCIUM SERPL-MCNC: 9 MG/DL (ref 8.6–10.3)
CARDIAC TROPONIN I PNL SERPL HS: 3 NG/L (ref 0–13)
CARDIAC TROPONIN I PNL SERPL HS: 3 NG/L (ref 0–13)
CHLORIDE SERPL-SCNC: 105 MMOL/L (ref 98–107)
CO2 SERPL-SCNC: 25 MMOL/L (ref 21–32)
CREAT SERPL-MCNC: 0.63 MG/DL (ref 0.5–1.05)
EGFRCR SERPLBLD CKD-EPI 2021: 90 ML/MIN/1.73M*2
EOSINOPHIL # BLD AUTO: 0.17 X10*3/UL (ref 0–0.4)
EOSINOPHIL NFR BLD AUTO: 2.9 %
ERYTHROCYTE [DISTWIDTH] IN BLOOD BY AUTOMATED COUNT: 15.1 % (ref 11.5–14.5)
FLUAV RNA RESP QL NAA+PROBE: NOT DETECTED
FLUBV RNA RESP QL NAA+PROBE: NOT DETECTED
GLUCOSE SERPL-MCNC: 126 MG/DL (ref 74–99)
HCT VFR BLD AUTO: 36.9 % (ref 36–46)
HGB BLD-MCNC: 11.9 G/DL (ref 12–16)
IMM GRANULOCYTES # BLD AUTO: 0.02 X10*3/UL (ref 0–0.5)
IMM GRANULOCYTES NFR BLD AUTO: 0.3 % (ref 0–0.9)
INR PPP: 1 (ref 0.9–1.1)
LACTATE SERPL-SCNC: 1.5 MMOL/L (ref 0.4–2)
LYMPHOCYTES # BLD AUTO: 1.73 X10*3/UL (ref 0.8–3)
LYMPHOCYTES NFR BLD AUTO: 29.1 %
MAGNESIUM SERPL-MCNC: 2.07 MG/DL (ref 1.6–2.4)
MCH RBC QN AUTO: 28.8 PG (ref 26–34)
MCHC RBC AUTO-ENTMCNC: 32.2 G/DL (ref 32–36)
MCV RBC AUTO: 89 FL (ref 80–100)
MONOCYTES # BLD AUTO: 0.53 X10*3/UL (ref 0.05–0.8)
MONOCYTES NFR BLD AUTO: 8.9 %
NEUTROPHILS # BLD AUTO: 3.47 X10*3/UL (ref 1.6–5.5)
NEUTROPHILS NFR BLD AUTO: 58.3 %
NRBC BLD-RTO: 0 /100 WBCS (ref 0–0)
P AXIS: 0 DEGREES
P OFFSET: 191 MS
P ONSET: 140 MS
PLATELET # BLD AUTO: 307 X10*3/UL (ref 150–450)
POTASSIUM SERPL-SCNC: 3.7 MMOL/L (ref 3.5–5.3)
PR INTERVAL: 168 MS
PROT SERPL-MCNC: 7 G/DL (ref 6.4–8.2)
PROTHROMBIN TIME: 11.3 SECONDS (ref 9.8–12.8)
Q ONSET: 224 MS
QRS COUNT: 13 BEATS
QRS DURATION: 74 MS
QT INTERVAL: 418 MS
QTC CALCULATION(BAZETT): 460 MS
QTC FREDERICIA: 446 MS
R AXIS: 58 DEGREES
RBC # BLD AUTO: 4.13 X10*6/UL (ref 4–5.2)
SARS-COV-2 RNA RESP QL NAA+PROBE: NOT DETECTED
SODIUM SERPL-SCNC: 141 MMOL/L (ref 136–145)
T AXIS: 38 DEGREES
T OFFSET: 433 MS
VENTRICULAR RATE: 73 BPM
WBC # BLD AUTO: 6 X10*3/UL (ref 4.4–11.3)

## 2024-02-02 PROCEDURE — 83605 ASSAY OF LACTIC ACID: CPT | Performed by: EMERGENCY MEDICINE

## 2024-02-02 PROCEDURE — 36415 COLL VENOUS BLD VENIPUNCTURE: CPT | Performed by: EMERGENCY MEDICINE

## 2024-02-02 PROCEDURE — 99223 1ST HOSP IP/OBS HIGH 75: CPT | Performed by: INTERNAL MEDICINE

## 2024-02-02 PROCEDURE — 84484 ASSAY OF TROPONIN QUANT: CPT | Performed by: EMERGENCY MEDICINE

## 2024-02-02 PROCEDURE — 93005 ELECTROCARDIOGRAM TRACING: CPT

## 2024-02-02 PROCEDURE — 2500000001 HC RX 250 WO HCPCS SELF ADMINISTERED DRUGS (ALT 637 FOR MEDICARE OP): Performed by: INTERNAL MEDICINE

## 2024-02-02 PROCEDURE — 80053 COMPREHEN METABOLIC PANEL: CPT | Performed by: EMERGENCY MEDICINE

## 2024-02-02 PROCEDURE — 2500000002 HC RX 250 W HCPCS SELF ADMINISTERED DRUGS (ALT 637 FOR MEDICARE OP, ALT 636 FOR OP/ED): Performed by: INTERNAL MEDICINE

## 2024-02-02 PROCEDURE — 2500000004 HC RX 250 GENERAL PHARMACY W/ HCPCS (ALT 636 FOR OP/ED): Performed by: INTERNAL MEDICINE

## 2024-02-02 PROCEDURE — 96375 TX/PRO/DX INJ NEW DRUG ADDON: CPT

## 2024-02-02 PROCEDURE — 85610 PROTHROMBIN TIME: CPT | Performed by: EMERGENCY MEDICINE

## 2024-02-02 PROCEDURE — 99285 EMERGENCY DEPT VISIT HI MDM: CPT | Performed by: EMERGENCY MEDICINE

## 2024-02-02 PROCEDURE — 83735 ASSAY OF MAGNESIUM: CPT | Performed by: EMERGENCY MEDICINE

## 2024-02-02 PROCEDURE — 2500000004 HC RX 250 GENERAL PHARMACY W/ HCPCS (ALT 636 FOR OP/ED): Performed by: EMERGENCY MEDICINE

## 2024-02-02 PROCEDURE — 83880 ASSAY OF NATRIURETIC PEPTIDE: CPT | Performed by: EMERGENCY MEDICINE

## 2024-02-02 PROCEDURE — 71045 X-RAY EXAM CHEST 1 VIEW: CPT

## 2024-02-02 PROCEDURE — 1200000002 HC GENERAL ROOM WITH TELEMETRY DAILY

## 2024-02-02 PROCEDURE — 96365 THER/PROPH/DIAG IV INF INIT: CPT

## 2024-02-02 PROCEDURE — 87636 SARSCOV2 & INF A&B AMP PRB: CPT | Performed by: EMERGENCY MEDICINE

## 2024-02-02 PROCEDURE — 85025 COMPLETE CBC W/AUTO DIFF WBC: CPT | Performed by: EMERGENCY MEDICINE

## 2024-02-02 PROCEDURE — 94640 AIRWAY INHALATION TREATMENT: CPT

## 2024-02-02 PROCEDURE — 71045 X-RAY EXAM CHEST 1 VIEW: CPT | Performed by: RADIOLOGY

## 2024-02-02 PROCEDURE — 2500000002 HC RX 250 W HCPCS SELF ADMINISTERED DRUGS (ALT 637 FOR MEDICARE OP, ALT 636 FOR OP/ED): Performed by: EMERGENCY MEDICINE

## 2024-02-02 RX ORDER — ONDANSETRON HYDROCHLORIDE 2 MG/ML
4 INJECTION, SOLUTION INTRAVENOUS EVERY 4 HOURS PRN
Status: DISCONTINUED | OUTPATIENT
Start: 2024-02-02 | End: 2024-02-06 | Stop reason: HOSPADM

## 2024-02-02 RX ORDER — ALUMINUM HYDROXIDE, MAGNESIUM HYDROXIDE, AND SIMETHICONE 1200; 120; 1200 MG/30ML; MG/30ML; MG/30ML
30 SUSPENSION ORAL 4 TIMES DAILY PRN
Status: DISCONTINUED | OUTPATIENT
Start: 2024-02-02 | End: 2024-02-06 | Stop reason: HOSPADM

## 2024-02-02 RX ORDER — NITROGLYCERIN 0.4 MG/1
0.4 TABLET SUBLINGUAL EVERY 5 MIN PRN
Status: DISCONTINUED | OUTPATIENT
Start: 2024-02-02 | End: 2024-02-06 | Stop reason: HOSPADM

## 2024-02-02 RX ORDER — ALENDRONATE SODIUM 70 MG/1
70 TABLET ORAL
Status: DISCONTINUED | OUTPATIENT
Start: 2024-02-05 | End: 2024-02-06 | Stop reason: HOSPADM

## 2024-02-02 RX ORDER — POTASSIUM CHLORIDE 750 MG/1
10 TABLET, FILM COATED, EXTENDED RELEASE ORAL DAILY
Status: DISCONTINUED | OUTPATIENT
Start: 2024-02-02 | End: 2024-02-06 | Stop reason: HOSPADM

## 2024-02-02 RX ORDER — DICLOFENAC SODIUM 10 MG/G
4 GEL TOPICAL 4 TIMES DAILY
Status: DISCONTINUED | OUTPATIENT
Start: 2024-02-02 | End: 2024-02-06 | Stop reason: HOSPADM

## 2024-02-02 RX ORDER — ACETAMINOPHEN 325 MG/1
650 TABLET ORAL EVERY 4 HOURS PRN
Status: DISCONTINUED | OUTPATIENT
Start: 2024-02-02 | End: 2024-02-06 | Stop reason: HOSPADM

## 2024-02-02 RX ORDER — POLYETHYLENE GLYCOL 3350 17 G/17G
17 POWDER, FOR SOLUTION ORAL DAILY
Status: DISCONTINUED | OUTPATIENT
Start: 2024-02-02 | End: 2024-02-06 | Stop reason: HOSPADM

## 2024-02-02 RX ORDER — ENOXAPARIN SODIUM 100 MG/ML
40 INJECTION SUBCUTANEOUS EVERY 24 HOURS
Status: DISCONTINUED | OUTPATIENT
Start: 2024-02-02 | End: 2024-02-06 | Stop reason: HOSPADM

## 2024-02-02 RX ORDER — ATORVASTATIN CALCIUM 20 MG/1
40 TABLET, FILM COATED ORAL NIGHTLY
Status: DISCONTINUED | OUTPATIENT
Start: 2024-02-02 | End: 2024-02-06 | Stop reason: HOSPADM

## 2024-02-02 RX ORDER — ACETAMINOPHEN 500 MG
5 TABLET ORAL NIGHTLY PRN
Status: DISCONTINUED | OUTPATIENT
Start: 2024-02-02 | End: 2024-02-06 | Stop reason: HOSPADM

## 2024-02-02 RX ORDER — ASPIRIN 81 MG/1
81 TABLET ORAL DAILY
Status: DISCONTINUED | OUTPATIENT
Start: 2024-02-02 | End: 2024-02-06 | Stop reason: HOSPADM

## 2024-02-02 RX ORDER — MAGNESIUM HYDROXIDE 2400 MG/10ML
10 SUSPENSION ORAL DAILY PRN
Status: DISCONTINUED | OUTPATIENT
Start: 2024-02-02 | End: 2024-02-06 | Stop reason: HOSPADM

## 2024-02-02 RX ORDER — CODEINE PHOSPHATE AND GUAIFENESIN 10; 100 MG/5ML; MG/5ML
5 SOLUTION ORAL EVERY 4 HOURS PRN
Status: DISCONTINUED | OUTPATIENT
Start: 2024-02-02 | End: 2024-02-06 | Stop reason: HOSPADM

## 2024-02-02 RX ORDER — IPRATROPIUM BROMIDE AND ALBUTEROL SULFATE 2.5; .5 MG/3ML; MG/3ML
3 SOLUTION RESPIRATORY (INHALATION) ONCE
Status: COMPLETED | OUTPATIENT
Start: 2024-02-02 | End: 2024-02-02

## 2024-02-02 RX ORDER — IBUPROFEN 200 MG
400 TABLET ORAL EVERY 6 HOURS PRN
COMMUNITY

## 2024-02-02 RX ORDER — ALBUTEROL SULFATE 0.83 MG/ML
2.5 SOLUTION RESPIRATORY (INHALATION) EVERY 6 HOURS PRN
Status: DISCONTINUED | OUTPATIENT
Start: 2024-02-02 | End: 2024-02-03

## 2024-02-02 RX ORDER — METOPROLOL TARTRATE 25 MG/1
25 TABLET, FILM COATED ORAL 2 TIMES DAILY
Status: DISCONTINUED | OUTPATIENT
Start: 2024-02-02 | End: 2024-02-06 | Stop reason: HOSPADM

## 2024-02-02 RX ORDER — ERGOCALCIFEROL 1.25 MG/1
1.25 CAPSULE ORAL
COMMUNITY

## 2024-02-02 RX ORDER — PANTOPRAZOLE SODIUM 40 MG/1
40 TABLET, DELAYED RELEASE ORAL DAILY
Status: DISCONTINUED | OUTPATIENT
Start: 2024-02-02 | End: 2024-02-06 | Stop reason: HOSPADM

## 2024-02-02 RX ORDER — ISOSORBIDE MONONITRATE 30 MG/1
30 TABLET, EXTENDED RELEASE ORAL DAILY
Status: DISCONTINUED | OUTPATIENT
Start: 2024-02-02 | End: 2024-02-06 | Stop reason: HOSPADM

## 2024-02-02 RX ORDER — ERGOCALCIFEROL 1.25 MG/1
1250 CAPSULE ORAL
Status: DISCONTINUED | OUTPATIENT
Start: 2024-02-05 | End: 2024-02-06 | Stop reason: HOSPADM

## 2024-02-02 RX ORDER — LANOLIN ALCOHOL/MO/W.PET/CERES
400 CREAM (GRAM) TOPICAL DAILY
Status: DISCONTINUED | OUTPATIENT
Start: 2024-02-02 | End: 2024-02-06 | Stop reason: HOSPADM

## 2024-02-02 RX ORDER — IPRATROPIUM BROMIDE AND ALBUTEROL SULFATE 2.5; .5 MG/3ML; MG/3ML
3 SOLUTION RESPIRATORY (INHALATION) EVERY 2 HOUR PRN
Status: DISCONTINUED | OUTPATIENT
Start: 2024-02-02 | End: 2024-02-06 | Stop reason: HOSPADM

## 2024-02-02 RX ORDER — GUAIFENESIN 600 MG/1
600 TABLET, EXTENDED RELEASE ORAL 2 TIMES DAILY
Status: DISCONTINUED | OUTPATIENT
Start: 2024-02-02 | End: 2024-02-06 | Stop reason: HOSPADM

## 2024-02-02 RX ORDER — MAGNESIUM SULFATE HEPTAHYDRATE 40 MG/ML
2 INJECTION, SOLUTION INTRAVENOUS ONCE
Status: COMPLETED | OUTPATIENT
Start: 2024-02-02 | End: 2024-02-02

## 2024-02-02 RX ORDER — HYDROCHLOROTHIAZIDE 25 MG/1
12.5 TABLET ORAL DAILY
Status: DISCONTINUED | OUTPATIENT
Start: 2024-02-02 | End: 2024-02-06 | Stop reason: HOSPADM

## 2024-02-02 RX ADMIN — ENOXAPARIN SODIUM 40 MG: 40 INJECTION SUBCUTANEOUS at 12:17

## 2024-02-02 RX ADMIN — METOPROLOL TARTRATE 25 MG: 25 TABLET, FILM COATED ORAL at 12:47

## 2024-02-02 RX ADMIN — METHYLPREDNISOLONE SODIUM SUCCINATE 125 MG: 125 INJECTION, POWDER, FOR SOLUTION INTRAMUSCULAR; INTRAVENOUS at 09:06

## 2024-02-02 RX ADMIN — GUAIFENESIN 600 MG: 600 TABLET, EXTENDED RELEASE ORAL at 20:10

## 2024-02-02 RX ADMIN — GUAIFENESIN AND CODEINE PHOSPHATE 5 ML: 100; 10 SOLUTION ORAL at 14:20

## 2024-02-02 RX ADMIN — ISOSORBIDE MONONITRATE 30 MG: 30 TABLET, EXTENDED RELEASE ORAL at 12:46

## 2024-02-02 RX ADMIN — METHYLPREDNISOLONE SODIUM SUCCINATE 40 MG: 40 INJECTION, POWDER, FOR SOLUTION INTRAMUSCULAR; INTRAVENOUS at 20:09

## 2024-02-02 RX ADMIN — ACETAMINOPHEN 650 MG: 325 TABLET ORAL at 20:09

## 2024-02-02 RX ADMIN — ATORVASTATIN CALCIUM 40 MG: 20 TABLET, FILM COATED ORAL at 20:10

## 2024-02-02 RX ADMIN — IPRATROPIUM BROMIDE AND ALBUTEROL SULFATE 3 ML: 2.5; .5 SOLUTION RESPIRATORY (INHALATION) at 09:45

## 2024-02-02 RX ADMIN — POTASSIUM CHLORIDE 10 MEQ: 750 TABLET, EXTENDED RELEASE ORAL at 12:47

## 2024-02-02 RX ADMIN — MAGNESIUM OXIDE 400 MG (241.3 MG MAGNESIUM) TABLET 400 MG: TABLET at 12:46

## 2024-02-02 RX ADMIN — HYDROCHLOROTHIAZIDE 12.5 MG: 25 TABLET ORAL at 12:46

## 2024-02-02 RX ADMIN — MAGNESIUM SULFATE HEPTAHYDRATE 2 G: 40 INJECTION, SOLUTION INTRAVENOUS at 09:07

## 2024-02-02 RX ADMIN — AZITHROMYCIN MONOHYDRATE 500 MG: 500 INJECTION, POWDER, LYOPHILIZED, FOR SOLUTION INTRAVENOUS at 15:04

## 2024-02-02 RX ADMIN — ALBUTEROL SULFATE 2.5 MG: 2.5 SOLUTION RESPIRATORY (INHALATION) at 20:21

## 2024-02-02 RX ADMIN — ASPIRIN 81 MG: 81 TABLET, COATED ORAL at 12:46

## 2024-02-02 RX ADMIN — GUAIFENESIN 600 MG: 600 TABLET, EXTENDED RELEASE ORAL at 13:08

## 2024-02-02 RX ADMIN — PANTOPRAZOLE SODIUM 40 MG: 40 TABLET, DELAYED RELEASE ORAL at 12:47

## 2024-02-02 RX ADMIN — DOXYCYCLINE 100 MG: 100 INJECTION, POWDER, LYOPHILIZED, FOR SOLUTION INTRAVENOUS at 09:57

## 2024-02-02 RX ADMIN — METOPROLOL TARTRATE 25 MG: 25 TABLET, FILM COATED ORAL at 20:10

## 2024-02-02 RX ADMIN — Medication 5 MG: at 20:10

## 2024-02-02 RX ADMIN — AZITHROMYCIN MONOHYDRATE 500 MG: 500 INJECTION, POWDER, LYOPHILIZED, FOR SOLUTION INTRAVENOUS at 14:20

## 2024-02-02 SDOH — SOCIAL STABILITY: SOCIAL INSECURITY: HAVE YOU HAD THOUGHTS OF HARMING ANYONE ELSE?: NO

## 2024-02-02 SDOH — SOCIAL STABILITY: SOCIAL INSECURITY: ARE YOU OR HAVE YOU BEEN THREATENED OR ABUSED PHYSICALLY, EMOTIONALLY, OR SEXUALLY BY ANYONE?: NO

## 2024-02-02 SDOH — SOCIAL STABILITY: SOCIAL INSECURITY: DOES ANYONE TRY TO KEEP YOU FROM HAVING/CONTACTING OTHER FRIENDS OR DOING THINGS OUTSIDE YOUR HOME?: NO

## 2024-02-02 SDOH — SOCIAL STABILITY: SOCIAL INSECURITY: ARE THERE ANY APPARENT SIGNS OF INJURIES/BEHAVIORS THAT COULD BE RELATED TO ABUSE/NEGLECT?: NO

## 2024-02-02 SDOH — SOCIAL STABILITY: SOCIAL INSECURITY: HAS ANYONE EVER THREATENED TO HURT YOUR FAMILY OR YOUR PETS?: NO

## 2024-02-02 SDOH — SOCIAL STABILITY: SOCIAL INSECURITY: ABUSE: ADULT

## 2024-02-02 SDOH — SOCIAL STABILITY: SOCIAL INSECURITY: DO YOU FEEL ANYONE HAS EXPLOITED OR TAKEN ADVANTAGE OF YOU FINANCIALLY OR OF YOUR PERSONAL PROPERTY?: NO

## 2024-02-02 SDOH — SOCIAL STABILITY: SOCIAL INSECURITY: DO YOU FEEL UNSAFE GOING BACK TO THE PLACE WHERE YOU ARE LIVING?: NO

## 2024-02-02 SDOH — SOCIAL STABILITY: SOCIAL INSECURITY: WERE YOU ABLE TO COMPLETE ALL THE BEHAVIORAL HEALTH SCREENINGS?: YES

## 2024-02-02 ASSESSMENT — COGNITIVE AND FUNCTIONAL STATUS - GENERAL
MOBILITY SCORE: 19
CLIMB 3 TO 5 STEPS WITH RAILING: A LITTLE
DRESSING REGULAR LOWER BODY CLOTHING: A LITTLE
TOILETING: A LITTLE
TOILETING: A LITTLE
CLIMB 3 TO 5 STEPS WITH RAILING: A LITTLE
HELP NEEDED FOR BATHING: A LITTLE
PATIENT BASELINE BEDBOUND: NO
WALKING IN HOSPITAL ROOM: A LITTLE
HELP NEEDED FOR BATHING: A LITTLE
DAILY ACTIVITIY SCORE: 20
DRESSING REGULAR UPPER BODY CLOTHING: A LITTLE
TURNING FROM BACK TO SIDE WHILE IN FLAT BAD: A LITTLE
MOBILITY SCORE: 19
DRESSING REGULAR UPPER BODY CLOTHING: A LITTLE
TURNING FROM BACK TO SIDE WHILE IN FLAT BAD: A LITTLE
WALKING IN HOSPITAL ROOM: A LITTLE
MOVING TO AND FROM BED TO CHAIR: A LITTLE
DRESSING REGULAR LOWER BODY CLOTHING: A LITTLE
DAILY ACTIVITIY SCORE: 20
MOVING TO AND FROM BED TO CHAIR: A LITTLE
STANDING UP FROM CHAIR USING ARMS: A LITTLE
STANDING UP FROM CHAIR USING ARMS: A LITTLE

## 2024-02-02 ASSESSMENT — LIFESTYLE VARIABLES
AUDIT-C TOTAL SCORE: 0
HOW OFTEN DO YOU HAVE 6 OR MORE DRINKS ON ONE OCCASION: NEVER
HOW MANY STANDARD DRINKS CONTAINING ALCOHOL DO YOU HAVE ON A TYPICAL DAY: PATIENT DOES NOT DRINK
SKIP TO QUESTIONS 9-10: 1
AUDIT-C TOTAL SCORE: 0
HOW OFTEN DO YOU HAVE A DRINK CONTAINING ALCOHOL: NEVER

## 2024-02-02 ASSESSMENT — PAIN SCALES - GENERAL
PAINLEVEL_OUTOF10: 0 - NO PAIN
PAINLEVEL_OUTOF10: 0 - NO PAIN
PAINLEVEL_OUTOF10: 5 - MODERATE PAIN

## 2024-02-02 ASSESSMENT — PAIN DESCRIPTION - LOCATION: LOCATION: HEAD

## 2024-02-02 ASSESSMENT — ACTIVITIES OF DAILY LIVING (ADL)
JUDGMENT_ADEQUATE_SAFELY_COMPLETE_DAILY_ACTIVITIES: YES
ADEQUATE_TO_COMPLETE_ADL: YES
PATIENT'S MEMORY ADEQUATE TO SAFELY COMPLETE DAILY ACTIVITIES?: YES
HEARING - RIGHT EAR: FUNCTIONAL
GROOMING: INDEPENDENT
HEARING - LEFT EAR: FUNCTIONAL
BATHING: NEEDS ASSISTANCE
WALKS IN HOME: NEEDS ASSISTANCE
FEEDING YOURSELF: INDEPENDENT
ASSISTIVE_DEVICE: WALKER
DRESSING YOURSELF: INDEPENDENT
LACK_OF_TRANSPORTATION: NO
TOILETING: NEEDS ASSISTANCE

## 2024-02-02 ASSESSMENT — COLUMBIA-SUICIDE SEVERITY RATING SCALE - C-SSRS
6. HAVE YOU EVER DONE ANYTHING, STARTED TO DO ANYTHING, OR PREPARED TO DO ANYTHING TO END YOUR LIFE?: NO
1. IN THE PAST MONTH, HAVE YOU WISHED YOU WERE DEAD OR WISHED YOU COULD GO TO SLEEP AND NOT WAKE UP?: NO
2. HAVE YOU ACTUALLY HAD ANY THOUGHTS OF KILLING YOURSELF?: NO

## 2024-02-02 ASSESSMENT — PAIN - FUNCTIONAL ASSESSMENT
PAIN_FUNCTIONAL_ASSESSMENT: 0-10

## 2024-02-02 ASSESSMENT — PATIENT HEALTH QUESTIONNAIRE - PHQ9
1. LITTLE INTEREST OR PLEASURE IN DOING THINGS: NEARLY EVERY DAY
SUM OF ALL RESPONSES TO PHQ9 QUESTIONS 1 & 2: 6
2. FEELING DOWN, DEPRESSED OR HOPELESS: NEARLY EVERY DAY

## 2024-02-02 ASSESSMENT — PAIN DESCRIPTION - DESCRIPTORS: DESCRIPTORS: ACHING

## 2024-02-02 NOTE — H&P
02/02/24       CHIEF COMPLAINT:      Chief Complaint   Patient presents with    Shortness of Breath     Woke up and did 2 albuterol treatments without relief, 95% RA, does not normally wear oxygen        PCP is Bhanu Diaz MD, pulmonologist is Dr. Arzola, EP physician is Dr. Matute    History is taken from the patient who is a good historian, discussion with the ER physician & ER records, [facility] & prior [facility] outpatient medical records, prior [facility] medical records and records from [facility].  She is also known to me from prior admissions.    Rae Lundy is a 79 y.o. female with a known history of COPD, CAD, HTN, HLP, paroxysmal A-fib (not on anticoagulation due to prior GI bleed), history of TIA, history of iron deficiency anemia due to angiodysplasias, DJD, Villeda's esophagus, and prior TIA.    She states she was in her usual state of fairly good health until several days ago when she developed some increasing shortness of breath and wheezing.  She had some increased sputum production which she describes as yellow to occasionally darker.  It is rather thick.  Her cough is so bad that she is having some problems with losing her urine when she coughs.  No fevers or chills.  She tried increasing her nebulized treatments at home but continued to have worsening dyspnea.  No recent infections.    In the ER CBC was unremarkable, metabolic panel with a random blood sugar of 126, normal electrolytes.  BUN 10 with a creatinine of 0.63.  LFTs normal.  Magnesium 2.07.  BNP was 71.  Lactate 1.5.  Influenza and COVID-19 negative.  Initial troponin was 3 with a repeat of 3.  CXR showed stable bibasilar linear scarring.  Patient received methylprednisolone, magnesium, doxycycline IV, and several nebulized treatments, but continued to have significant symptoms and her O2 sat was 87 % off O2.      ROS:   She denies any chest pains or palpitations.  No dizziness or episodes of  syncope.  She denies difficulty swallowing, heartburn, no diarrhea.  Has some borderline constipation (small pellets), no blood in her stool.  No nausea or vomiting.  No fever/chills/night sweats.  She does have significant DJD of her right knee and is scheduled to have a knee replacement in March.  She has some chronic swelling of her left ankle which improves by morning.  She does admit to continued grieving after losing her  last year.  She denies insomnia, appetite is good.    PAST MEDICAL HISTORY   -Coronary artery disease s/p stent to LAD1/23/2020  -Paroxysmal atrial fibrillation not on anticoagulation due to GI blood loss  -Hypertension  -COPD  -Hyperlipidemia  -Cervical spinal stenosis  -History of TIA  -Recent thyroid nodule s/p resection  -History of Villeda's esophagus and duodenal ulcer on EGD 10/2019  -History of recurrent iron deficiency anemia, angiodysplasia of duodenum on EGD 2019  -History of panic attacks   -Vitamin D deficiency   -Urinary incontinence   -DJD of the hands     PAST SURGICAL HISTORY:   -Remote right knee arthroscopic surgery  -Left breast cyst/lumpectomy in 1990-benign  -Laparoscopic cholecystectomy 1995  -EGD 12/17/2014-mild chronic gastritis, negative H. pylori  -Colonoscopy 12/29/2014-colon polyps (hyperplastic)  -Left fourth toe amputation for bunion 2015  -EGD 8/10/2016-gastric polyps, Villeda's esophagus on pathology  -EGD 10/29/2019-Villeda's esophagus, hiatal hernia, small duodenal ulcer, gastritis with bile reflux, and duodenal angiodysplasias.  -Right thyroidectomy 11/8/2019  -Cardiac catheterization 11/11/2019--right dominant system with 100% mid chronic occlusion and 95% distal stenosis with collaterals.  LAD with second major diagonal branch with sharp bend and 50-70%  stenosis which appeared focal.  Other lesser stenoses, EF = 50%  -Loop recorder placed 11/21/2019  -EGD 12/27/2019-diffuse mild inflammation with edema and erythema  -Cardiac catheterization  2020 with YAYA to proximal LAD on 2020   -Colonoscopy 10/14/2020-polyp (hyperplastic), diverticulosis  -Bladder suspension-urethral sling and cystoscopy 7/15/2022   -Bladder surgery for incontinence at [facility]-cystoscopy with Bulkamid 2022    FAMILY HISTORY:   -Father- age 62-CA esophagus (was both a smoker and drinker)  -Mother- age 73, inadvertent drug OD, history of heart murmur  -Siblings-2 brothers & 1 sister-A&W  -Children-3 sons and 1 daughter-A&W    SOCIAL HISTORY:   -Tobacco-quit smoking 2016 after 1 pack daily  for 45 years  -Alcohol-rare  -Drugs-denied  -Marital-she was  last year, lives alone and is independent in her ADLs.  Son lives nearby to help with heavy lifting  -Qqiksizvsz-yrbhqsi-symep trustee for [employer]    ASSESSMENT/PLAN:   -Acute decompensation of COPD with hypoxia-will treat with intensive nebulized treatments, steroids, azithromycin.  Cough syrup for her symptomatic cough.   -History of paroxysmal atrial fibrillation not on anticoagulation-continue her metoprolol tartrate, telemetry  -Hyperlipidemia-continue her atorvastatin  -Hypertension-BP was up when she arrived on the floor, she had not had her morning medications.  Will continue to follow it after starting her home meds.  -History of iron deficiency anemia due to angiodysplasia in the past-presently not significantly anemic, not on anticoagulation for the paroxysmal A-fib due to this.  -Coronary artery disease-will continue her isosorbide mononitrate, aspirin, and atorvastatin.  -History of Villeda's esophagus and prior GI bleeds-continue her pantoprazole.  -Osteoporosis on alendronate  -Mild constipation-will give daily MiraLAX  -Grief-appropriate due to recent loss of her   -DJD of the right knee-scheduled for knee replacement in March.  Will try diclofenac gel    ALLERGIES:     Allergies   Allergen Reactions    Prednisone Psychosis         Medications prior to admission:      Medications Prior to Admission   Medication Sig Dispense Refill Last Dose    acetaminophen (Tylenol 8 HOUR) 650 mg ER tablet Take 2 tablets (1,300 mg) by mouth once daily.   2/1/2024 at am    alendronate (Fosamax) 70 mg tablet Take 1 tablet (70 mg) by mouth 1 (one) time per week. (Patient taking differently: Take 1 tablet (70 mg) by mouth 1 (one) time per week. Monday) 12 tablet 1 1/29/2024    aspirin 81 mg EC tablet Take 1 tablet (81 mg) by mouth once daily.   2/1/2024    atorvastatin (Lipitor) 40 mg tablet Take 1 tablet (40 mg) by mouth once daily. 90 tablet 1 2/1/2024    ergocalciferol (Vitamin D-2) 1.25 MG (86517 UT) capsule Take 1 capsule (1,250 mcg) by mouth 1 (one) time per week. Monday 1/29/2024    hydroCHLOROthiazide (Microzide) 12.5 mg capsule Take 1 capsule (12.5 mg) by mouth once daily.   2/1/2024    ibuprofen 200 mg tablet Take 2 tablets (400 mg) by mouth every 6 hours if needed for mild pain (1 - 3).   Unknown    ibuprofen 600 mg tablet Take 1 tablet (600 mg) by mouth every 6 hours if needed for moderate pain (4 - 6) or mild pain (1 - 3). 20 tablet 0     ipratropium-albuteroL (Duo-Neb) 0.5-2.5 mg/3 mL nebulizer solution Take 3 mL by nebulization 2 times a day.   2/1/2024    isosorbide mononitrate ER (Imdur) 30 mg 24 hr tablet Take 1 tablet (30 mg) by mouth once daily.   2/1/2024    magnesium oxide (Mag-Ox) 400 mg (241.3 mg magnesium) tablet Take 1 tablet (400 mg) by mouth once daily.   2/1/2024    metoprolol tartrate (Lopressor) 50 mg tablet Take 0.5 tablets by mouth 2 times a day.   2/1/2024    multivit-min/ferrous fumarate (MULTI VITAMIN ORAL) Take 1 tablet by mouth once daily.   2/1/2024    nitroglycerin (Nitrostat) 0.4 mg SL tablet Place 1 tablet (0.4 mg) under the tongue every 5 minutes if needed for chest pain (Up to 3 doses as needed. call 911 if pain persists.).   Unknown    omeprazole (PriLOSEC) 20 mg DR capsule Take 1 capsule (20 mg) by mouth once daily in the morning. Take before  "meals. Do not crush or chew.   2/1/2024    potassium chloride CR 10 mEq ER tablet Take 1 tablet (10 mEq) by mouth once daily.   2/1/2024        Present Medications:  Scheduled medications   Medication Dose Route Frequency    [START ON 2/3/2024] alendronate  70 mg oral Weekly    aspirin  81 mg oral Daily    atorvastatin  40 mg oral Daily    enoxaparin  40 mg subcutaneous q24h    [START ON 2/3/2024] ergocalciferol  1,250 mcg oral Weekly    hydroCHLOROthiazide  12.5 mg oral Daily    isosorbide mononitrate ER  30 mg oral Daily    magnesium oxide  400 mg oral Daily    methylPREDNISolone sodium succinate (PF)  40 mg intravenous q12h    metoprolol tartrate  25 mg oral BID    [START ON 2/3/2024] pantoprazole  40 mg oral Daily before breakfast    polyethylene glycol  17 g oral Daily    potassium chloride CR  10 mEq oral Daily        PRN medications   Medication    acetaminophen    albuterol    alum-mag hydroxide-simeth    ipratropium-albuteroL    magnesium hydroxide    melatonin    nitroglycerin    ondansetron         I&O:  No intake or output data in the 24 hours ending 02/02/24 1225     Vital Signs:  Blood pressure (!) 188/88, pulse 81, temperature 36.4 °C (97.5 °F), temperature source Temporal, resp. rate 18, height 1.626 m (5' 4\"), weight 68 kg (150 lb), SpO2 93 %.    Physical Exam:  -General appearance: Well-developed, well-nourished white female, sitting up on a gurney in the ER, alert and presently in NAD.  She just finished a breathing treatment.  -Vital signs:  As above  -HEENT: Pupils are reactive, extraocular movements intact.  Lids, conjunctiva, sclera are normal.  Mouth/pharynx normal.  -Neck: No JVD or adenopathy  -Chest: Few scattered expiratory wheezes, fair air movement.  No rales.  -Cardiac: Regular rhythm, no murmurs  -Abdomen: Soft, bowel sounds active.  It is nontender.  No palpable masses or organomegaly.  -Extremities: 1+ pitting edema at the left ankle, also some mild right ankle edema.  -Skin: Dry " skin, otherwise no rash  -Neurologic:   Patient is alert and oriented x3.  Cranial nerves II through XII are intact.  -Behavior/Emotional:  Appropriate, cooperative, appropriately tearful when discussing the loss of her  recently    Labs:  Results for orders placed or performed during the hospital encounter of 02/02/24 (from the past 24 hour(s))   ECG 12 lead   Result Value Ref Range    Ventricular Rate 73 BPM    Atrial Rate 73 BPM    AR Interval 168 ms    QRS Duration 74 ms    QT Interval 418 ms    QTC Calculation(Bazett) 460 ms    P Axis 0 degrees    R Axis 58 degrees    T Axis 38 degrees    QRS Count 13 beats    Q Onset 224 ms    P Onset 140 ms    P Offset 191 ms    T Offset 433 ms    QTC Fredericia 446 ms   Sars-CoV-2 and Influenza A/B PCR   Result Value Ref Range    Flu A Result Not Detected Not Detected    Flu B Result Not Detected Not Detected    Coronavirus 2019, PCR Not Detected Not Detected   Protime-INR   Result Value Ref Range    Protime 11.3 9.8 - 12.8 seconds    INR 1.0 0.9 - 1.1   Lactate   Result Value Ref Range    Lactate 1.5 0.4 - 2.0 mmol/L     CBC:   Results from last 7 days   Lab Units 02/02/24  0757   WBC AUTO x10*3/uL 6.0   RBC AUTO x10*6/uL 4.13   HEMOGLOBIN g/dL 11.9*   HEMATOCRIT % 36.9   MCV fL 89   MCH pg 28.8   MCHC g/dL 32.2   RDW % 15.1*   PLATELETS AUTO x10*3/uL 307     CMP:    Results from last 7 days   Lab Units 02/02/24  0757   SODIUM mmol/L 141   POTASSIUM mmol/L 3.7   CHLORIDE mmol/L 105   CO2 mmol/L 25   BUN mg/dL 10   CREATININE mg/dL 0.63   GLUCOSE mg/dL 126*   PROTEIN TOTAL g/dL 7.0   CALCIUM mg/dL 9.0   BILIRUBIN TOTAL mg/dL 0.5   ALK PHOS U/L 112   AST U/L 22   ALT U/L 18     Magnesium:    Results from last 7 days   Lab Units 02/02/24  0757   MAGNESIUM mg/dL 2.07     Troponin:    Results from last 7 days   Lab Units 02/02/24  0900 02/02/24  0757   TROPHS ng/L 3 3     Radiology:  XR chest 1 view   Final Result   Cardiomegaly.        Stable bibasilar linear scarring.         No significant or acute interval change.        MACRO:   None        Signed by: Murphy Lemon 2/2/2024 8:14 AM   Dictation workstation:   [id number]           Kary Garaz MD      *Some of this note was completed using Dragon voice recognition technology and may include unintended errors with respect to translation of words, typographical errors or grammar errors which may not have been identified prior to finalization of the chart note.

## 2024-02-02 NOTE — ED TRIAGE NOTES
Pt to Ed for c/o shortness of breath for 2 weeks worsening.  Pt states she gave herself 2 albuterol treatments at home and a treatment in squad.  Pt denies pain.  95% on Ra, but drops to 89% with any exertion. No acute distress noted at this time. Denies CP.  A&Ox2.  VSS.

## 2024-02-02 NOTE — ED PROVIDER NOTES
HPI   Chief Complaint   Patient presents with    Shortness of Breath     Woke up and did 2 albuterol treatments without relief, 95% RA, does not normally wear oxygen         History provided by:  EMS personnel and patient  History of Present Illness:  79-year-old female presents with cough and shortness of breath for the past couple of days.  She does have a known history of COPD.  She took 2 of her albuterol treatments this morning without relief.  No fever or chills.  Cough is nonproductive.  No chest pain.  No back or flank pain.  No nausea or vomiting.  No leg swelling.  Worse with exertion.  No sick contacts.  No trauma or travel.      PMFSH:   As per HPI, otherwise nurses notes reviewed in EMR.    Past Medical History:   Active Ambulatory Problems     Diagnosis Date Noted    Villeda esophagus 10/23/2023    Benign essential hypertension 10/23/2023    CAD (coronary artery disease) 10/23/2023    COPD (chronic obstructive pulmonary disease) (CMS/HCC) 10/23/2023    Cough, persistent 10/23/2023    Dementia (CMS/HCC) 10/23/2023    Hypokalemia 10/23/2023    Lumbar degenerative disc disease 10/23/2023    Mixed hyperlipidemia 10/23/2023    OAB (overactive bladder) 10/23/2023    Paroxysmal atrial fibrillation (CMS/HCC) 10/23/2023    Sciatic neuropathy 10/23/2023    Senile osteoporosis 10/23/2023    Status post placement of implantable loop recorder 10/23/2023    Tachycardia-bradycardia syndrome (CMS/HCC) 10/23/2023    TIA (transient ischemic attack) 10/23/2023    Vitamin D insufficiency 10/23/2023    Intracranial aneurysm 10/23/2023    Bronchitis 10/23/2023    Hypoxia 10/23/2023     Resolved Ambulatory Problems     Diagnosis Date Noted    No Resolved Ambulatory Problems     Past Medical History:   Diagnosis Date    Acute candidiasis of vulva and vagina 10/22/2018    Anxiety disorder, unspecified     Body mass index (BMI) 29.0-29.9, adult 03/04/2022    Bunion of left foot     Candidiasis of skin and nail 10/22/2018     COVID-19 04/25/2022    Gastro-esophageal reflux disease without esophagitis     Nontoxic multinodular goiter     Overweight 03/04/2022    Personal history of other diseases of the digestive system 01/17/2019    Personal history of other diseases of the musculoskeletal system and connective tissue     Personal history of other diseases of the nervous system and sense organs     Personal history of other endocrine, nutritional and metabolic disease     Personal history of other infectious and parasitic diseases     Pneumonia, unspecified organism     Spondylosis without myelopathy or radiculopathy, cervical region       Past Surgical History:   Past Surgical History:   Procedure Laterality Date    BLADDER SURGERY      BREAST LUMPECTOMY  09/28/2017    Left Breast Lumpectomy    CHOLECYSTECTOMY  09/28/2017    Cholecystectomy    CT HEAD ANGIO W AND WO IV CONTRAST  04/10/2019    CT HEAD ANGIO W AND WO IV CONTRAST 4/10/2019 [facility]    KNEE ARTHROSCOPY W/ DEBRIDEMENT  09/28/2017    Arthroscopy Knee Right    MR HEAD ANGIO WO IV CONTRAST  09/05/2019    MR HEAD ANGIO WO IV CONTRAST 9/5/2019 [facility]    MR HEAD ANGIO WO IV CONTRAST  01/19/2020    MR HEAD ANGIO WO IV CONTRAST 1/19/2020 [facility]    MR NECK ANGIO WO IV CONTRAST  01/19/2020    MR NECK ANGIO WO IV CONTRAST 1/19/2020 [facility]    OTHER SURGICAL HISTORY  09/28/2017    Surgery Foot Amputation Metatarsal And Toe    OTHER SURGICAL HISTORY  09/07/2022    Loop recorder insertion    OTHER SURGICAL HISTORY  11/14/2021    Thyroid surgery    OTHER SURGICAL HISTORY  11/14/2021    Complete colonoscopy    OTHER SURGICAL HISTORY  11/14/2021    Knee surgery      Family History:   Family History   Problem Relation Name Age of Onset    Arthritis Mother      Heart failure Mother      Esophageal cancer Mother      Rheumatic fever Mother      Other (heart problem) Mother      Esophageal cancer Father      Other (cardiac disorder) Son         Social History:    Social History     Socioeconomic History    Marital status:      Spouse name: Not on file    Number of children: Not on file    Years of education: Not on file    Highest education level: Not on file   Occupational History    Not on file   Tobacco Use    Smoking status: Former     Types: Cigarettes    Smokeless tobacco: Former   Vaping Use    Vaping Use: Never used   Substance and Sexual Activity    Alcohol use: Never    Drug use: Never    Sexual activity: Not on file   Other Topics Concern    Not on file   Social History Narrative    Not on file     Social Determinants of Health     Financial Resource Strain: Not on file   Food Insecurity: Not on file   Transportation Needs: Not on file   Physical Activity: Not on file   Stress: Not on file   Social Connections: Not on file   Intimate Partner Violence: Not on file   Housing Stability: Not on file                          Jessie Coma Scale Score: 15                  Patient History   Past Medical History:   Diagnosis Date    Acute candidiasis of vulva and vagina 10/22/2018    Yeast infection of the vagina    Anxiety disorder, unspecified     Anxiety and depression    Body mass index (BMI) 29.0-29.9, adult 03/04/2022    BMI 29.0-29.9,adult    Bunion of left foot     Bunion, left    Candidiasis of skin and nail 10/22/2018    Yeast infection of the skin    COVID-19 04/25/2022    COVID-19 virus infection    Gastro-esophageal reflux disease without esophagitis     Chronic GERD    Nontoxic multinodular goiter     Multinodular goiter    Overweight 03/04/2022    Overweight    Personal history of other diseases of the digestive system 01/17/2019    History of gastroesophageal reflux (GERD)    Personal history of other diseases of the musculoskeletal system and connective tissue     History of osteopenia    Personal history of other diseases of the nervous system and sense organs     History of hearing loss    Personal history of other endocrine,  nutritional and metabolic disease     History of hyperlipidemia    Personal history of other infectious and parasitic diseases     History of herpes zoster    Pneumonia, unspecified organism     Community acquired pneumonia    Spondylosis without myelopathy or radiculopathy, cervical region     Osteoarthritis of cervical spine     Past Surgical History:   Procedure Laterality Date    BLADDER SURGERY      BREAST LUMPECTOMY  09/28/2017    Left Breast Lumpectomy    CHOLECYSTECTOMY  09/28/2017    Cholecystectomy    CT HEAD ANGIO W AND WO IV CONTRAST  04/10/2019    CT HEAD ANGIO W AND WO IV CONTRAST 4/10/2019 [facility]    KNEE ARTHROSCOPY W/ DEBRIDEMENT  09/28/2017    Arthroscopy Knee Right    MR HEAD ANGIO WO IV CONTRAST  09/05/2019    MR HEAD ANGIO WO IV CONTRAST 9/5/2019 [facility]    MR HEAD ANGIO WO IV CONTRAST  01/19/2020    MR HEAD ANGIO WO IV CONTRAST 1/19/2020 [facility]    MR NECK ANGIO WO IV CONTRAST  01/19/2020    MR NECK ANGIO WO IV CONTRAST 1/19/2020 [facility]    OTHER SURGICAL HISTORY  09/28/2017    Surgery Foot Amputation Metatarsal And Toe    OTHER SURGICAL HISTORY  09/07/2022    Loop recorder insertion    OTHER SURGICAL HISTORY  11/14/2021    Thyroid surgery    OTHER SURGICAL HISTORY  11/14/2021    Complete colonoscopy    OTHER SURGICAL HISTORY  11/14/2021    Knee surgery     Family History   Problem Relation Name Age of Onset    Arthritis Mother      Heart failure Mother      Esophageal cancer Mother      Rheumatic fever Mother      Other (heart problem) Mother      Esophageal cancer Father      Other (cardiac disorder) Son       Social History     Tobacco Use    Smoking status: Former     Types: Cigarettes    Smokeless tobacco: Former   Vaping Use    Vaping Use: Never used   Substance Use Topics    Alcohol use: Never    Drug use: Never       Physical Exam   ED Triage Vitals [02/02/24 0721]   Temperature Heart Rate Respirations BP   36.5 °C (97.7 °F) 80 20  160/82      Pulse Ox Temp Source Heart Rate Source Patient Position   95 % Temporal -- --      BP Location FiO2 (%)     -- --       Physical Exam  Physical Exam:    ED Triage Vitals [02/02/24 0721]   Temperature Heart Rate Respirations BP   36.5 °C (97.7 °F) 80 20 160/82      Pulse Ox Temp Source Heart Rate Source Patient Position   95 % Temporal -- --      BP Location FiO2 (%)     -- --         Constitutional: Vital signs per nursing notes.  Well developed, well nourished.  No acute distress.    Psychiatric: alert and oriented to person, place, and time; no abnormalities of mood or affect; memory intact  Eyes: PERRL; conjunctivae and lids normal; EOMI  ENT: otoscopic exam of external canal and TM´s normal; nasal mucosa, turbinates, and septum normal; mouth, tongue, and pharynx normal; pharynx without edema, exudate, or injection  Neck: neck supple, no meningismus; trachea midline without deviation; no lymphadenopathy; no thyromegaly; carotid pulses even bilaterally  Chest: no masses or tenderness, no discharge  Respiratory: normal respiratory effort and excursion; no rales, rhonchi, or wheezes; equal but diminished air entry  Cardiovascular: regular rate and rhythm; no murmurs, rubs or gallops; symmetric pulses; no edema; normal capillary refill; distal pulses present  Neurological: normal speech; CN II-XII grossly intact; normal motor and sensory function; no nystagmus; no pronator drift  GI: no masses, tenderness, rebound or guarding; no palpable, pulsatile mass; no organomegaly; no hernia; normal bowel sounds; (-) Kovacs´s sign; (-) McBurney´s sign; (-) CVA tenderness  Lymphatic: no adenopathy of neck  Musculoskeletal: normal gait and station; normal digits and nails; no gross tendon or ligament injury; normal to palpation; normal strength/tone; neurovascular status intact; (-) Francheska´s sign  Skin: normal to inspection; normal to palpation; no rash  GCS: 15    ED Course & MDM   Diagnoses as of 02/02/24 4914    COPD exacerbation (CMS/[facility])       Medical Decision Making  Medical Decision Making:    Differential Diagnoses Considered: COPD exacerbation, pneumonia, viral syndrome, ACS, arrhythmia     EKG: My interpretation of EKG is normal sinus rhythm at 73 bpm with no acute ST-T changes    Labs Reviewed   CBC WITH AUTO DIFFERENTIAL - Abnormal       Result Value    WBC 6.0      nRBC 0.0      RBC 4.13      Hemoglobin 11.9 (*)     Hematocrit 36.9      MCV 89      MCH 28.8      MCHC 32.2      RDW 15.1 (*)     Platelets 307      Neutrophils % 58.3      Immature Granulocytes %, Automated 0.3      Lymphocytes % 29.1      Monocytes % 8.9      Eosinophils % 2.9      Basophils % 0.5      Neutrophils Absolute 3.47      Immature Granulocytes Absolute, Automated 0.02      Lymphocytes Absolute 1.73      Monocytes Absolute 0.53      Eosinophils Absolute 0.17      Basophils Absolute 0.03     COMPREHENSIVE METABOLIC PANEL - Abnormal    Glucose 126 (*)     Sodium 141      Potassium 3.7      Chloride 105      Bicarbonate 25      Anion Gap 15      Urea Nitrogen 10      Creatinine 0.63      eGFR 90      Calcium 9.0      Albumin 4.2      Alkaline Phosphatase 112      Total Protein 7.0      AST 22      Bilirubin, Total 0.5      ALT 18     MAGNESIUM - Normal    Magnesium 2.07     B-TYPE NATRIURETIC PEPTIDE - Normal    BNP 71      Narrative:        <100 pg/mL - Heart failure unlikely  100-299 pg/mL - Intermediate probability of acute heart                  failure exacerbation. Correlate with clinical                  context and patient history.    >=300 pg/mL - Heart Failure likely. Correlate with clinical                  context and patient history.    BNP testing is performed using different testing methodology at [facility] than at other [facility]. Direct result comparisons should only be made within the same method.      PROTIME-INR - Normal    Protime 11.3      INR 1.0     LACTATE - Normal    Lactate 1.5      Narrative:      Venipuncture immediately after or during the administration of Metamizole may lead to falsely low results. Testing should be performed immediately  prior to Metamizole dosing.   SARS-COV-2 AND INFLUENZA A/B PCR - Normal    Flu A Result Not Detected      Flu B Result Not Detected      Coronavirus 2019, PCR Not Detected      Narrative:     This assay has received FDA Emergency Use Authorization (EUA) and  is only authorized for the duration of time that circumstances exist to justify the authorization of the emergency use of in vitro diagnostic tests for the detection of SARS-CoV-2 virus and/or diagnosis of COVID-19 infection under section 564(b)(1) of the Act, 21 U.S.C. 360bbb-3(b)(1). Testing for SARS-CoV-2 is only recommended for patients who meet current clinical and/or epidemiological criteria as defined by federal, state, or local public health directives. This assay is an in vitro diagnostic nucleic acid amplification test for the qualitative detection of SARS-CoV-2, Influenza A, and Influenza B from nasopharyngeal specimens and has been validated for use at [facility]. Negative results do not preclude COVID-19 infections or Influenza A/B infections, and should not be used as the sole basis for diagnosis, treatment, or other management decisions. If Influenza A/B and RSV PCR results are negative, testing for Parainfluenza virus, Adenovirus and Metapneumovirus is routinely performed for [facility] pediatric oncology and intensive care inpatients, and is available on other patients by placing an add-on request.    SERIAL TROPONIN-INITIAL - Normal    Troponin I, High Sensitivity 3      Narrative:     Less than 99th percentile of normal range cutoff-  Female and children under 18 years old <14 ng/L; Male <21 ng/L: Negative  Repeat testing should be performed if clinically indicated.     Female and children under 18 years old 14-50 ng/L; Male 21-50 ng/L:  Consistent with possible cardiac damage  and possible increased clinical   risk. Serial measurements may help to assess extent of myocardial damage.     >50 ng/L: Consistent with cardiac damage, increased clinical risk and  myocardial infarction. Serial measurements may help assess extent of   myocardial damage.      NOTE: Children less than 1 year old may have higher baseline troponin   levels and results should be interpreted in conjunction with the overall   clinical context.     NOTE: Troponin I testing is performed using a different   testing methodology at [facility] than at other   [facility]. Direct result comparisons should only   be made within the same method.   SERIAL TROPONIN, 1 HOUR - Normal    Troponin I, High Sensitivity 3      Narrative:     Less than 99th percentile of normal range cutoff-  Female and children under 18 years old <14 ng/L; Male <21 ng/L: Negative  Repeat testing should be performed if clinically indicated.     Female and children under 18 years old 14-50 ng/L; Male 21-50 ng/L:  Consistent with possible cardiac damage and possible increased clinical   risk. Serial measurements may help to assess extent of myocardial damage.     >50 ng/L: Consistent with cardiac damage, increased clinical risk and  myocardial infarction. Serial measurements may help assess extent of   myocardial damage.      NOTE: Children less than 1 year old may have higher baseline troponin   levels and results should be interpreted in conjunction with the overall   clinical context.     NOTE: Troponin I testing is performed using a different   testing methodology at [facility] than at other   [facility]. Direct result comparisons should only   be made within the same method.   TROPONIN SERIES- (INITIAL, 1 HR)    Narrative:     The following orders were created for panel order Troponin I Series, High Sensitivity (0, 1 HR).  Procedure                               Abnormality         Status                      ---------                               -----------         ------                     Troponin I, High Sensiti...[[id number]]  Normal              Final result               Troponin, High Sensitivi...[[id number]]  Normal              Final result                 Please view results for these tests on the individual orders.       XR chest 1 view   Final Result   Cardiomegaly.        Stable bibasilar linear scarring.        No significant or acute interval change.        MACRO:   None        Signed by: Murphy Lemon 2/2/2024 8:14 AM   Dictation workstation:   [id number]          Diagnostic testing considered:         Review of recent and relevant records:    ED Medication Administration:   ED Medication Administration from 02/02/2024 0715 to 02/02/2024 0944         Date/Time Order Dose Route Action Action by     02/02/2024 0906 EST methylPREDNISolone sod succinate (SOLU-Medrol) injection 125 mg 125 mg intravenous Given [facility]     02/02/2024 0907 EST magnesium sulfate IV 2 g 2 g intravenous New Bag [facility]     02/02/2024 0932 EST magnesium sulfate IV 2 g 0 g intravenous Stopped [facility]            Prescription Medication Consideration/Given:     Social Determinants of Health Significantly Affecting Care:      Summary:    /86 (02/02/24 0930)    Temp      Pulse 72 (02/02/24 0930)   Resp      SpO2 (!) 90 % (02/02/24 0930)      Abnormal Labs Reviewed   CBC WITH AUTO DIFFERENTIAL - Abnormal; Notable for the following components:       Result Value    Hemoglobin 11.9 (*)     RDW 15.1 (*)     All other components within normal limits   COMPREHENSIVE METABOLIC PANEL - Abnormal; Notable for the following components:    Glucose 126 (*)     All other components within normal limits     Diagnostic evaluation was completed.  2 sets of high-sensitivity troponin were unremarkable so do not suspect acute coronary syndrome.  BNP is in the normal range so do not suspect congestive heart failure.  Influenza and  COVID are negative.  Metabolic panel is unremarkable except a slightly elevated glucose at 126.  Sodium potassium in the normal range.  Renal and liver function are in the normal range.  Lactate is in the normal range so do not suspect sepsis.  INR is 1.0.  CBC shows a normal white blood cell count so I do not suspect an overwhelming infectious process.  There is mild anemia with a hemoglobin of 11.9.  I suspect this to be chronic in nature and do not suspect acute blood loss.  Chest x-ray shows cardiomegaly.  Stable bibasilar linear scarring.  No significant or acute interval change.    The patient was treated in the emergency department with breathing treatments, IV steroid and magnesium.  She was also given a dose of IV antibiotic.  The patient does not use oxygen at home.  Off oxygen her oxygen saturation was 87 to 90%.  Therefore, the patient was kept on oxygen therapy.  She will require hospitalization for further pulmonary toilet and oxygen therapy.    I discussed the results and plan for hospitalization with the patient and/or family/friend if present.  Questions were addressed.  Patient and/or family/friend expressed understanding.    The patient's condition requires ongoing treatment and evaluation necessitating hospital admission.  I have reviewed the patient's history, physical exam, and test information with the admitting physician,      Dr. Garza             , who agrees to hospitalize the patient.         Diagnoses as of 02/02/24 0944   COPD exacerbation (CMS/[facility])         Procedure  Procedures     Juan GARCIA MD  02/02/24 0944

## 2024-02-03 PROBLEM — R73.9 STEROID-INDUCED HYPERGLYCEMIA: Status: ACTIVE | Noted: 2024-02-03

## 2024-02-03 PROBLEM — T38.0X5A STEROID-INDUCED HYPERGLYCEMIA: Status: ACTIVE | Noted: 2024-02-03

## 2024-02-03 LAB
ANION GAP SERPL CALC-SCNC: 12 MMOL/L (ref 10–20)
BUN SERPL-MCNC: 10 MG/DL (ref 6–23)
CALCIUM SERPL-MCNC: 9.2 MG/DL (ref 8.6–10.3)
CHLORIDE SERPL-SCNC: 102 MMOL/L (ref 98–107)
CO2 SERPL-SCNC: 27 MMOL/L (ref 21–32)
CREAT SERPL-MCNC: 0.62 MG/DL (ref 0.5–1.05)
EGFRCR SERPLBLD CKD-EPI 2021: >90 ML/MIN/1.73M*2
ERYTHROCYTE [DISTWIDTH] IN BLOOD BY AUTOMATED COUNT: 15 % (ref 11.5–14.5)
EST. AVERAGE GLUCOSE BLD GHB EST-MCNC: 140 MG/DL
GLUCOSE SERPL-MCNC: 145 MG/DL (ref 74–99)
HBA1C MFR BLD: 6.5 %
HCT VFR BLD AUTO: 36.2 % (ref 36–46)
HGB BLD-MCNC: 11.9 G/DL (ref 12–16)
HOLD SPECIMEN: NORMAL
MAGNESIUM SERPL-MCNC: 2.19 MG/DL (ref 1.6–2.4)
MCH RBC QN AUTO: 29.4 PG (ref 26–34)
MCHC RBC AUTO-ENTMCNC: 32.9 G/DL (ref 32–36)
MCV RBC AUTO: 89 FL (ref 80–100)
NRBC BLD-RTO: 0 /100 WBCS (ref 0–0)
PLATELET # BLD AUTO: 350 X10*3/UL (ref 150–450)
POTASSIUM SERPL-SCNC: 4.2 MMOL/L (ref 3.5–5.3)
RBC # BLD AUTO: 4.05 X10*6/UL (ref 4–5.2)
SODIUM SERPL-SCNC: 137 MMOL/L (ref 136–145)
WBC # BLD AUTO: 8.5 X10*3/UL (ref 4.4–11.3)

## 2024-02-03 PROCEDURE — 2500000004 HC RX 250 GENERAL PHARMACY W/ HCPCS (ALT 636 FOR OP/ED): Performed by: INTERNAL MEDICINE

## 2024-02-03 PROCEDURE — 36415 COLL VENOUS BLD VENIPUNCTURE: CPT | Performed by: INTERNAL MEDICINE

## 2024-02-03 PROCEDURE — 99232 SBSQ HOSP IP/OBS MODERATE 35: CPT | Performed by: INTERNAL MEDICINE

## 2024-02-03 PROCEDURE — 83036 HEMOGLOBIN GLYCOSYLATED A1C: CPT | Mod: ELYLAB | Performed by: INTERNAL MEDICINE

## 2024-02-03 PROCEDURE — 2500000002 HC RX 250 W HCPCS SELF ADMINISTERED DRUGS (ALT 637 FOR MEDICARE OP, ALT 636 FOR OP/ED): Performed by: INTERNAL MEDICINE

## 2024-02-03 PROCEDURE — 85027 COMPLETE CBC AUTOMATED: CPT | Performed by: INTERNAL MEDICINE

## 2024-02-03 PROCEDURE — 80048 BASIC METABOLIC PNL TOTAL CA: CPT | Performed by: INTERNAL MEDICINE

## 2024-02-03 PROCEDURE — 1200000002 HC GENERAL ROOM WITH TELEMETRY DAILY

## 2024-02-03 PROCEDURE — 94640 AIRWAY INHALATION TREATMENT: CPT

## 2024-02-03 PROCEDURE — 2500000001 HC RX 250 WO HCPCS SELF ADMINISTERED DRUGS (ALT 637 FOR MEDICARE OP): Performed by: INTERNAL MEDICINE

## 2024-02-03 PROCEDURE — 83735 ASSAY OF MAGNESIUM: CPT | Performed by: INTERNAL MEDICINE

## 2024-02-03 RX ORDER — ALBUTEROL SULFATE 0.83 MG/ML
2.5 SOLUTION RESPIRATORY (INHALATION) 2 TIMES DAILY
Status: DISCONTINUED | OUTPATIENT
Start: 2024-02-03 | End: 2024-02-05

## 2024-02-03 RX ADMIN — PANTOPRAZOLE SODIUM 40 MG: 40 TABLET, DELAYED RELEASE ORAL at 06:04

## 2024-02-03 RX ADMIN — GUAIFENESIN 600 MG: 600 TABLET, EXTENDED RELEASE ORAL at 08:39

## 2024-02-03 RX ADMIN — GUAIFENESIN 600 MG: 600 TABLET, EXTENDED RELEASE ORAL at 21:41

## 2024-02-03 RX ADMIN — DICLOFENAC SODIUM TOPICAL GEL, 1% 4 G: 10 GEL TOPICAL at 12:28

## 2024-02-03 RX ADMIN — AZITHROMYCIN MONOHYDRATE 500 MG: 500 INJECTION, POWDER, LYOPHILIZED, FOR SOLUTION INTRAVENOUS at 14:53

## 2024-02-03 RX ADMIN — METHYLPREDNISOLONE SODIUM SUCCINATE 40 MG: 40 INJECTION, POWDER, FOR SOLUTION INTRAMUSCULAR; INTRAVENOUS at 20:50

## 2024-02-03 RX ADMIN — DICLOFENAC SODIUM TOPICAL GEL, 1% 4 G: 10 GEL TOPICAL at 08:39

## 2024-02-03 RX ADMIN — GUAIFENESIN AND CODEINE PHOSPHATE 5 ML: 100; 10 SOLUTION ORAL at 09:59

## 2024-02-03 RX ADMIN — POTASSIUM CHLORIDE 10 MEQ: 750 TABLET, EXTENDED RELEASE ORAL at 08:39

## 2024-02-03 RX ADMIN — METHYLPREDNISOLONE SODIUM SUCCINATE 40 MG: 40 INJECTION, POWDER, FOR SOLUTION INTRAMUSCULAR; INTRAVENOUS at 08:39

## 2024-02-03 RX ADMIN — METOPROLOL TARTRATE 25 MG: 25 TABLET, FILM COATED ORAL at 08:39

## 2024-02-03 RX ADMIN — POLYETHYLENE GLYCOL 3350 17 G: 17 POWDER, FOR SOLUTION ORAL at 08:39

## 2024-02-03 RX ADMIN — ISOSORBIDE MONONITRATE 30 MG: 30 TABLET, EXTENDED RELEASE ORAL at 08:38

## 2024-02-03 RX ADMIN — ALBUTEROL SULFATE 2.5 MG: 2.5 SOLUTION RESPIRATORY (INHALATION) at 20:36

## 2024-02-03 RX ADMIN — HYDROCHLOROTHIAZIDE 12.5 MG: 25 TABLET ORAL at 08:38

## 2024-02-03 RX ADMIN — ENOXAPARIN SODIUM 40 MG: 40 INJECTION SUBCUTANEOUS at 12:27

## 2024-02-03 RX ADMIN — DICLOFENAC SODIUM TOPICAL GEL, 1% 4 G: 10 GEL TOPICAL at 17:50

## 2024-02-03 RX ADMIN — ACETAMINOPHEN 650 MG: 325 TABLET ORAL at 21:45

## 2024-02-03 RX ADMIN — METOPROLOL TARTRATE 25 MG: 25 TABLET, FILM COATED ORAL at 21:41

## 2024-02-03 RX ADMIN — GUAIFENESIN AND CODEINE PHOSPHATE 5 ML: 100; 10 SOLUTION ORAL at 04:21

## 2024-02-03 RX ADMIN — MAGNESIUM OXIDE 400 MG (241.3 MG MAGNESIUM) TABLET 400 MG: TABLET at 08:39

## 2024-02-03 RX ADMIN — DICLOFENAC SODIUM TOPICAL GEL, 1% 4 G: 10 GEL TOPICAL at 21:41

## 2024-02-03 RX ADMIN — ALBUTEROL SULFATE 2.5 MG: 2.5 SOLUTION RESPIRATORY (INHALATION) at 07:48

## 2024-02-03 RX ADMIN — GUAIFENESIN AND CODEINE PHOSPHATE 5 ML: 100; 10 SOLUTION ORAL at 20:50

## 2024-02-03 RX ADMIN — ASPIRIN 81 MG: 81 TABLET, COATED ORAL at 08:39

## 2024-02-03 RX ADMIN — ATORVASTATIN CALCIUM 40 MG: 20 TABLET, FILM COATED ORAL at 21:41

## 2024-02-03 ASSESSMENT — COGNITIVE AND FUNCTIONAL STATUS - GENERAL
PERSONAL GROOMING: A LITTLE
HELP NEEDED FOR BATHING: A LITTLE
CLIMB 3 TO 5 STEPS WITH RAILING: A LITTLE
STANDING UP FROM CHAIR USING ARMS: A LITTLE
WALKING IN HOSPITAL ROOM: A LITTLE
EATING MEALS: A LITTLE
WALKING IN HOSPITAL ROOM: A LITTLE
TURNING FROM BACK TO SIDE WHILE IN FLAT BAD: A LITTLE
EATING MEALS: A LITTLE
HELP NEEDED FOR BATHING: A LITTLE
PERSONAL GROOMING: A LITTLE
DRESSING REGULAR LOWER BODY CLOTHING: A LITTLE
DRESSING REGULAR UPPER BODY CLOTHING: A LITTLE
MOBILITY SCORE: 19
DAILY ACTIVITIY SCORE: 18
TOILETING: A LITTLE
DRESSING REGULAR UPPER BODY CLOTHING: A LITTLE
DAILY ACTIVITIY SCORE: 18
TOILETING: A LITTLE
CLIMB 3 TO 5 STEPS WITH RAILING: A LITTLE
DRESSING REGULAR LOWER BODY CLOTHING: A LITTLE
TURNING FROM BACK TO SIDE WHILE IN FLAT BAD: A LITTLE
MOBILITY SCORE: 19
MOVING TO AND FROM BED TO CHAIR: A LITTLE
STANDING UP FROM CHAIR USING ARMS: A LITTLE
MOVING TO AND FROM BED TO CHAIR: A LITTLE

## 2024-02-03 ASSESSMENT — PAIN DESCRIPTION - DESCRIPTORS: DESCRIPTORS: ACHING

## 2024-02-03 ASSESSMENT — PAIN SCALES - GENERAL
PAINLEVEL_OUTOF10: 0 - NO PAIN
PAINLEVEL_OUTOF10: 0 - NO PAIN
PAINLEVEL_OUTOF10: 3

## 2024-02-03 ASSESSMENT — PAIN - FUNCTIONAL ASSESSMENT
PAIN_FUNCTIONAL_ASSESSMENT: 0-10
PAIN_FUNCTIONAL_ASSESSMENT: 0-10

## 2024-02-03 NOTE — CARE PLAN
The patient's goals for the shift include      The clinical goals for the shift include Pt will be safe from falls and injury throughout this shift      Problem: Pain  Goal: My pain/discomfort is manageable  Outcome: Progressing     Problem: Safety  Goal: Patient will be injury free during hospitalization  Outcome: Progressing  Goal: I will remain free of falls  Outcome: Progressing     Problem: Daily Care  Goal: Daily care needs are met  Outcome: Progressing     Problem: Psychosocial Needs  Goal: Demonstrates ability to cope with hospitalization/illness  Outcome: Progressing  Goal: Collaborate with me, my family, and caregiver to identify my specific goals  Outcome: Progressing     Problem: Discharge Barriers  Goal: My discharge needs are met  Outcome: Progressing     Problem: Fall/Injury  Goal: Not fall by end of shift  Outcome: Progressing  Goal: Be free from injury by end of the shift  Outcome: Progressing  Goal: Verbalize understanding of personal risk factors for fall in the hospital  Outcome: Progressing  Goal: Verbalize understanding of risk factor reduction measures to prevent injury from fall in the home  Outcome: Progressing  Goal: Use assistive devices by end of the shift  Outcome: Progressing  Goal: Pace activities to prevent fatigue by end of the shift  Outcome: Progressing     Problem: Respiratory  Goal: Clear secretions with interventions this shift  Outcome: Progressing  Goal: Minimize anxiety/maximize coping throughout shift  Outcome: Progressing  Goal: Minimal/no exertional discomfort or dyspnea this shift  Outcome: Progressing  Goal: No signs of respiratory distress (eg. Use of accessory muscles. Peds grunting)  Outcome: Progressing  Goal: Patent airway maintained this shift  Outcome: Progressing  Goal: Tolerate mechanical ventilation evidenced by VS/agitation level this shift  Outcome: Progressing  Goal: Tolerate pulmonary toileting this shift  Outcome: Progressing  Goal: Verbalize decreased  shortness of breath this shift  Outcome: Progressing  Goal: Wean oxygen to maintain O2 saturation per order/standard this shift  Outcome: Progressing  Goal: Increase self care and/or family involvement in next 24 hours  Outcome: Progressing     Problem: Skin  Goal: Decreased wound size/increased tissue granulation at next dressing change  Outcome: Progressing  Goal: Participates in plan/prevention/treatment measures  Outcome: Progressing  Goal: Prevent/manage excess moisture  Outcome: Progressing  Goal: Prevent/minimize sheer/friction injuries  Outcome: Progressing  Goal: Promote/optimize nutrition  Outcome: Progressing  Goal: Promote skin healing  Outcome: Progressing

## 2024-02-03 NOTE — PROGRESS NOTES
02/03/24 1124   Discharge Planning   Living Arrangements Alone   Support Systems Children   Assistance Needed reports none PTA   Type of Residence Private residence   Home or Post Acute Services None   Patient expects to be discharged to: HOME   Does the patient need discharge transport arranged? No     Currently on 2L NC, states she does not wear oxygen at home.     Prefers dc HOME declining new needs at this time.

## 2024-02-03 NOTE — PROGRESS NOTES
"Rae Lundy is a 79 y.o. female on day 1 of admission presenting with COPD exacerbation (CMS/[facility]).      ASSESSMENT/PLAN   -Acute decompensation of COPD with hypoxia-improving with intensive nebulized treatments, steroids, azithromycin.  Cough syrup for her symptomatic cough, still dropped her sats to the low 80s with walking  -History of paroxysmal atrial fibrillation not on anticoagulation due to GI blood loss in the past-no A-fib on telemetry  -Constipation-improving with MiraLAX  -Hypertension-improving with resumption of her medications, was up this morning before she received them.  -Mild steroid-induced hyperglycemia-will check Accu-Cheks, start SSI if the elevate more.    -History of iron deficiency anemia due to angiodysplasia in the past-presently not significantly anemic, not on anticoagulation for the paroxysmal A-fib due to this.  -Hyperlipidemia-on atorvastatin  -Coronary artery disease-will continue her isosorbide mononitrate, aspirin, and atorvastatin.  -History of Villeda's esophagus and prior GI bleeds-on pantoprazole.  -Osteoporosis on alendronate  -Grief-appropriate due to recent loss of her  ( in November)  -DJD of the right knee-scheduled for knee replacement in March.  Will try diclofenac gel    SUBJECTIVE/OBJECTIVE   24   -Less cough than yesterday, not bringing any sputum up.  Still dyspneic, but improving.  No fevers or chills.  -Had a good bowel movement this morning after the MiraLAX.  First time for many days.  -\"I thought I was ready to give up yesterday\".  We discussed the normal grieving process.  -She has a nebulizer at home but does not have home oxygen.  -She is afebrile, blood pressure is up some this morning, were good previously.  -Satting 95% on O2 at 2L/NC.  Per nurse, on room air she was satting in the 90s while at rest, but when she walked a few steps to the chair she dropped into the low 80s.  Patient does not have oxygen at home.  -In NSR on telemetry, no " A-fib.  -Chest with some increased expiratory phase, no rales or rhonchi.  -Cardiac exam is a regular rhythm  -Chemistry panel with a blood sugar of 145, electrolytes otherwise normal.  Will continue to monitor her blood sugar  as she is on steroids.  -BUN 10 with a creatinine of 0.62.  -Magnesium 2.19.  -CBC with a WBC of 8.5, H/H 11.9/36.2.  Platelet count 350 K.    *These notes are being done using Dragon voice recognition technology and may include unintended errors with respect to translation of words, typographical errors or grammar errors which may not have been identified prior to finalization of the chart note.    CURRENT MEDICATIONS     Scheduled Medications:    Current Facility-Administered Medications:     acetaminophen (Tylenol) tablet 650 mg, 650 mg, oral, q4h PRN, Kary Garza MD, 650 mg at 02/02/24 2009    albuterol 2.5 mg /3 mL (0.083 %) nebulizer solution 2.5 mg, 2.5 mg, nebulization, BID, Kary Garza MD, 2.5 mg at 02/03/24 0748    [START ON 2/5/2024] alendronate (Fosamax) tablet 70 mg, 70 mg, oral, Weekly, Kary Garza MD    alum-mag hydroxide-simeth (Mylanta) 200-200-20 mg/5 mL oral suspension 30 mL, 30 mL, oral, 4x daily PRN, Kary Garza MD    aspirin EC tablet 81 mg, 81 mg, oral, Daily, Kary Garza MD, 81 mg at 02/03/24 0839    atorvastatin (Lipitor) tablet 40 mg, 40 mg, oral, Nightly, Kary Garza MD, 40 mg at 02/02/24 2010    azithromycin (Zithromax) in dextrose 5 % in water (D5W) 250 mL  mg, 500 mg, intravenous, q24h, Kary Garza MD    codeine-guaifenesin (Robitussin-AC)  mg/5 mL syrup 5 mL, 5 mL, oral, q4h PRN, Kary Garza MD, 5 mL at 02/03/24 0959    diclofenac sodium (Voltaren) 1 % gel 4 g, 4 g, Topical, 4x daily, Kary Garza MD, 4 g at 02/03/24 0839    enoxaparin (Lovenox) syringe 40 mg, 40 mg, subcutaneous, q24h, Kary Garza MD, 40 mg at 02/02/24 1217    [START ON 2/5/2024] ergocalciferol (Vitamin D-2) capsule 1,250 mcg, 1,250 mcg, oral, Weekly, Kary Garza MD    guaiFENesin  (Mucinex) 12 hr tablet 600 mg, 600 mg, oral, BID, Kary Garza MD, 600 mg at 02/03/24 0839    hydroCHLOROthiazide (HYDRODiuril) tablet 12.5 mg, 12.5 mg, oral, Daily, Kary Garza MD, 12.5 mg at 02/03/24 0838    ipratropium-albuteroL (Duo-Neb) 0.5-2.5 mg/3 mL nebulizer solution 3 mL, 3 mL, nebulization, q2h PRN, Kary Garza MD    isosorbide mononitrate ER (Imdur) 24 hr tablet 30 mg, 30 mg, oral, Daily, Kary Garza MD, 30 mg at 02/03/24 0838    magnesium hydroxide (Milk of Magnesia) 2,400 mg/10 mL suspension 10 mL, 10 mL, oral, Daily PRN, Kary Garza MD    magnesium oxide (Mag-Ox) tablet 400 mg, 400 mg, oral, Daily, Kary Garza MD, 400 mg at 02/03/24 0839    melatonin tablet 5 mg, 5 mg, oral, Nightly PRN, Kary Garza MD, 5 mg at 02/02/24 2010    methylPREDNISolone sod succinate (PF) (SOLU-Medrol) 40 mg/mL injection 40 mg, 40 mg, intravenous, q12h, Kary Garza MD, 40 mg at 02/03/24 0839    metoprolol tartrate (Lopressor) tablet 25 mg, 25 mg, oral, BID, Kary Garza MD, 25 mg at 02/03/24 0839    nitroglycerin (Nitrostat) SL tablet 0.4 mg, 0.4 mg, sublingual, q5 min PRN, Kary Garza MD    ondansetron (Zofran) injection 4 mg, 4 mg, intravenous, q4h PRN, Kary Garza MD    pantoprazole (ProtoNix) EC tablet 40 mg, 40 mg, oral, Daily, Kary Garza MD, 40 mg at 02/03/24 0604    polyethylene glycol (Glycolax, Miralax) packet 17 g, 17 g, oral, Daily, Kary Garza MD, 17 g at 02/03/24 0839    potassium chloride CR (Klor-Con) ER tablet 10 mEq, 10 mEq, oral, Daily, Kary Garza MD, 10 mEq at 02/03/24 0839     PRN Medications:  PRN medications   Medication    acetaminophen    alum-mag hydroxide-simeth    codeine-guaifenesin    ipratropium-albuteroL    magnesium hydroxide    melatonin    nitroglycerin    ondansetron         IVs:        I&Os     Intake/Output last 3 Shifts:  I/O last 3 completed shifts:  In: 650 (7.4 mL/kg) [I.V.:50 (0.6 mL/kg); IV Piggyback:600]  Out: 1350 (15.3 mL/kg) [Urine:1350 (0.4 mL/kg/hr)]  Weight: 88.4 kg     VITAL  "SIGNS     Blood pressure (!) 185/84, pulse 80, temperature 36.2 °C (97.2 °F), temperature source Temporal, resp. rate 18, height 1.626 m (5' 4\"), weight 88.4 kg (194 lb 14.2 oz), SpO2 95 %.     /72 (BP Location: Right arm, Patient Position: Sitting)   Pulse 75   Temp 36.2 °C (97.2 °F) (Temporal)   Resp 18   Ht 1.626 m (5' 4\")   Wt 88.4 kg (194 lb 14.2 oz)   SpO2 92%   BMI 33.45 kg/m²      PHYSICAL EXAM   Physical exam:  -General appearance: Patient is sitting up in bed alert.  Still somewhat dyspneic but without cough, no audible wheezing.  -Vital signs:  As above  -HEENT: Nasal O2 in place  -Neck: No JVD  -Chest: Increased expiratory phase, no rales or rhonchi  -Cardiac: Regular rhythm  -Abdomen: Active bowel sounds  -Extremities: No edema  -Skin: No rash  -Neurologic:   Patient is alert and oriented x3.   -Behavior/Emotional:  Appropriate, cooperative-slightly tearful when discussing the loss of her     LABS   Relevant Results:  Results from last 7 days   Lab Units 02/03/24  0625 02/02/24  0757   GLUCOSE mg/dL 145* 126*      HbA1c:    Lab Results   Component Value Date    HGBA1C 6.4 (H) 02/23/2022     CBC:   Results from last 7 days   Lab Units 02/03/24  0624 02/02/24  0757   WBC AUTO x10*3/uL 8.5 6.0   RBC AUTO x10*6/uL 4.05 4.13   HEMOGLOBIN g/dL 11.9* 11.9*   HEMATOCRIT % 36.2 36.9   MCV fL 89 89   MCH pg 29.4 28.8   MCHC g/dL 32.9 32.2   RDW % 15.0* 15.1*   PLATELETS AUTO x10*3/uL 350 307       CMP:    Results from last 7 days   Lab Units 02/03/24  0625 02/02/24  0757   SODIUM mmol/L 137 141   POTASSIUM mmol/L 4.2 3.7   CHLORIDE mmol/L 102 105   CO2 mmol/L 27 25   BUN mg/dL 10 10   CREATININE mg/dL 0.62 0.63   GLUCOSE mg/dL 145* 126*   PROTEIN TOTAL g/dL  --  7.0   CALCIUM mg/dL 9.2 9.0   BILIRUBIN TOTAL mg/dL  --  0.5   ALK PHOS U/L  --  112   AST U/L  --  22   ALT U/L  --  18     Magnesium:  Results from last 7 days   Lab Units 02/03/24  0625 02/02/24  0757   MAGNESIUM mg/dL 2.19 2.07 "     Troponin:    Results from last 7 days   Lab Units 02/02/24  0900 02/02/24  0757   TROPHS ng/L 3 3     INR:  @LABRCNT(INR:3)@  BNP:   Results from last 7 days   Lab Units 02/02/24  0757   BNP pg/mL 71     Lipid Panel:  @lastlipid@   Cultures:  No results found for the last 90 days.      IMAGING     XR chest 1 view   Final Result   Cardiomegaly.        Stable bibasilar linear scarring.        No significant or acute interval change.        MACRO:   None        Signed by: Murphy Lemon 2/2/2024 8:14 AM   Dictation workstation:   [id number]        I spent 35 minutes in the professional and overall care of this patient.    Kary Garza MD

## 2024-02-04 LAB
ANION GAP SERPL CALC-SCNC: 12 MMOL/L (ref 10–20)
BUN SERPL-MCNC: 15 MG/DL (ref 6–23)
CALCIUM SERPL-MCNC: 9.3 MG/DL (ref 8.6–10.3)
CHLORIDE SERPL-SCNC: 99 MMOL/L (ref 98–107)
CO2 SERPL-SCNC: 30 MMOL/L (ref 21–32)
CREAT SERPL-MCNC: 0.69 MG/DL (ref 0.5–1.05)
EGFRCR SERPLBLD CKD-EPI 2021: 88 ML/MIN/1.73M*2
GLUCOSE SERPL-MCNC: 118 MG/DL (ref 74–99)
HOLD SPECIMEN: NORMAL
HOLD SPECIMEN: NORMAL
POTASSIUM SERPL-SCNC: 4.1 MMOL/L (ref 3.5–5.3)
SODIUM SERPL-SCNC: 137 MMOL/L (ref 136–145)

## 2024-02-04 PROCEDURE — 1200000002 HC GENERAL ROOM WITH TELEMETRY DAILY

## 2024-02-04 PROCEDURE — 36415 COLL VENOUS BLD VENIPUNCTURE: CPT | Performed by: INTERNAL MEDICINE

## 2024-02-04 PROCEDURE — 2500000002 HC RX 250 W HCPCS SELF ADMINISTERED DRUGS (ALT 637 FOR MEDICARE OP, ALT 636 FOR OP/ED): Performed by: INTERNAL MEDICINE

## 2024-02-04 PROCEDURE — 99232 SBSQ HOSP IP/OBS MODERATE 35: CPT | Performed by: INTERNAL MEDICINE

## 2024-02-04 PROCEDURE — 80048 BASIC METABOLIC PNL TOTAL CA: CPT | Performed by: INTERNAL MEDICINE

## 2024-02-04 PROCEDURE — 94640 AIRWAY INHALATION TREATMENT: CPT

## 2024-02-04 PROCEDURE — 2500000005 HC RX 250 GENERAL PHARMACY W/O HCPCS: Performed by: INTERNAL MEDICINE

## 2024-02-04 PROCEDURE — 2500000001 HC RX 250 WO HCPCS SELF ADMINISTERED DRUGS (ALT 637 FOR MEDICARE OP): Performed by: INTERNAL MEDICINE

## 2024-02-04 PROCEDURE — 2500000004 HC RX 250 GENERAL PHARMACY W/ HCPCS (ALT 636 FOR OP/ED): Performed by: INTERNAL MEDICINE

## 2024-02-04 RX ADMIN — GUAIFENESIN 600 MG: 600 TABLET, EXTENDED RELEASE ORAL at 08:28

## 2024-02-04 RX ADMIN — DICLOFENAC SODIUM TOPICAL GEL, 1% 4 G: 10 GEL TOPICAL at 14:08

## 2024-02-04 RX ADMIN — DICLOFENAC SODIUM TOPICAL GEL, 1% 4 G: 10 GEL TOPICAL at 21:04

## 2024-02-04 RX ADMIN — DICLOFENAC SODIUM TOPICAL GEL, 1% 4 G: 10 GEL TOPICAL at 16:23

## 2024-02-04 RX ADMIN — HYDROCHLOROTHIAZIDE 12.5 MG: 25 TABLET ORAL at 08:27

## 2024-02-04 RX ADMIN — IPRATROPIUM BROMIDE AND ALBUTEROL SULFATE 3 ML: .5; 3 SOLUTION RESPIRATORY (INHALATION) at 07:45

## 2024-02-04 RX ADMIN — ALBUTEROL SULFATE 2.5 MG: 2.5 SOLUTION RESPIRATORY (INHALATION) at 20:48

## 2024-02-04 RX ADMIN — METOPROLOL TARTRATE 25 MG: 25 TABLET, FILM COATED ORAL at 21:03

## 2024-02-04 RX ADMIN — ENOXAPARIN SODIUM 40 MG: 40 INJECTION SUBCUTANEOUS at 10:53

## 2024-02-04 RX ADMIN — ISOSORBIDE MONONITRATE 30 MG: 30 TABLET, EXTENDED RELEASE ORAL at 08:28

## 2024-02-04 RX ADMIN — METHYLPREDNISOLONE SODIUM SUCCINATE 40 MG: 40 INJECTION, POWDER, FOR SOLUTION INTRAMUSCULAR; INTRAVENOUS at 20:01

## 2024-02-04 RX ADMIN — POTASSIUM CHLORIDE 10 MEQ: 750 TABLET, EXTENDED RELEASE ORAL at 08:28

## 2024-02-04 RX ADMIN — METHYLPREDNISOLONE SODIUM SUCCINATE 40 MG: 40 INJECTION, POWDER, FOR SOLUTION INTRAMUSCULAR; INTRAVENOUS at 08:29

## 2024-02-04 RX ADMIN — DICLOFENAC SODIUM TOPICAL GEL, 1% 4 G: 10 GEL TOPICAL at 05:35

## 2024-02-04 RX ADMIN — Medication 3 L/MIN: at 20:56

## 2024-02-04 RX ADMIN — MAGNESIUM OXIDE 400 MG (241.3 MG MAGNESIUM) TABLET 400 MG: TABLET at 08:28

## 2024-02-04 RX ADMIN — ATORVASTATIN CALCIUM 40 MG: 20 TABLET, FILM COATED ORAL at 21:03

## 2024-02-04 RX ADMIN — AZITHROMYCIN MONOHYDRATE 500 MG: 500 INJECTION, POWDER, LYOPHILIZED, FOR SOLUTION INTRAVENOUS at 14:08

## 2024-02-04 RX ADMIN — POLYETHYLENE GLYCOL 3350 17 G: 17 POWDER, FOR SOLUTION ORAL at 08:27

## 2024-02-04 RX ADMIN — GUAIFENESIN 600 MG: 600 TABLET, EXTENDED RELEASE ORAL at 21:04

## 2024-02-04 RX ADMIN — ASPIRIN 81 MG: 81 TABLET, COATED ORAL at 08:27

## 2024-02-04 RX ADMIN — METOPROLOL TARTRATE 25 MG: 25 TABLET, FILM COATED ORAL at 08:27

## 2024-02-04 RX ADMIN — PANTOPRAZOLE SODIUM 40 MG: 40 TABLET, DELAYED RELEASE ORAL at 05:35

## 2024-02-04 RX ADMIN — GUAIFENESIN AND CODEINE PHOSPHATE 5 ML: 100; 10 SOLUTION ORAL at 20:01

## 2024-02-04 ASSESSMENT — PAIN - FUNCTIONAL ASSESSMENT: PAIN_FUNCTIONAL_ASSESSMENT: 0-10

## 2024-02-04 ASSESSMENT — COGNITIVE AND FUNCTIONAL STATUS - GENERAL
HELP NEEDED FOR BATHING: A LITTLE
DAILY ACTIVITIY SCORE: 20
WALKING IN HOSPITAL ROOM: A LITTLE
STANDING UP FROM CHAIR USING ARMS: A LITTLE
CLIMB 3 TO 5 STEPS WITH RAILING: A LITTLE
HELP NEEDED FOR BATHING: A LITTLE
DRESSING REGULAR UPPER BODY CLOTHING: A LITTLE
DAILY ACTIVITIY SCORE: 20
MOVING TO AND FROM BED TO CHAIR: A LITTLE
DRESSING REGULAR LOWER BODY CLOTHING: A LITTLE
TURNING FROM BACK TO SIDE WHILE IN FLAT BAD: A LITTLE
MOVING TO AND FROM BED TO CHAIR: A LITTLE
TOILETING: A LITTLE
TURNING FROM BACK TO SIDE WHILE IN FLAT BAD: A LITTLE
WALKING IN HOSPITAL ROOM: A LITTLE
DRESSING REGULAR UPPER BODY CLOTHING: A LITTLE
CLIMB 3 TO 5 STEPS WITH RAILING: A LITTLE
MOBILITY SCORE: 19
STANDING UP FROM CHAIR USING ARMS: A LITTLE
DRESSING REGULAR LOWER BODY CLOTHING: A LITTLE
MOBILITY SCORE: 19
TOILETING: A LITTLE

## 2024-02-04 ASSESSMENT — PAIN SCALES - GENERAL
PAINLEVEL_OUTOF10: 0 - NO PAIN
PAINLEVEL_OUTOF10: 0 - NO PAIN

## 2024-02-04 ASSESSMENT — PAIN SCALES - WONG BAKER: WONGBAKER_NUMERICALRESPONSE: NO HURT

## 2024-02-04 NOTE — CARE PLAN
Problem: Safety  Goal: Patient will be injury free during hospitalization  Outcome: Progressing     The patient's goals for the shift include safety    The clinical goals for the shift include patient will be safe throughout the shift

## 2024-02-04 NOTE — PROGRESS NOTES
Rae Lundy is a 79 y.o. female on day 2 of admission presenting with COPD exacerbation (CMS/HCC).      ASSESSMENT/PLAN   -Acute decompensation of COPD with hypoxia-improving with intensive nebulized treatments, steroids, azithromycin but still hypoxic off oxygen, consult RT for home oxygen eval.    -History of paroxysmal atrial fibrillation not on anticoagulation due to GI blood loss in the past-no A-fib on telemetry since admission  -Constipation-improving with MiraLAX  -Hypertension-improving with resumption of her medications, was up this morning before she received them.  -Mild steroid-induced hyperglycemia-blood sugars well-controlled, not requiring insulin.  Her HbA1c was 6.5.    -History of iron deficiency anemia due to angiodysplasia in the past-not on anticoagulation for the paroxysmal A-fib due to this.  -Hyperlipidemia-on atorvastatin  -Coronary artery disease-on isosorbide mononitrate, aspirin, and atorvastatin.  -History of Villeda's esophagus and prior GI bleeds-on pantoprazole.  -Osteoporosis on alendronate  -Grief-appropriate due to recent loss of her  ( in November)  -DJD of the right knee-scheduled for knee replacement in March.  Will try diclofenac gel    SUBJECTIVE/OBJECTIVE   24   -Breathing is improving some, not coughing as much.  No nausea or vomiting.  Still gets very dyspneic with walking, but can walk further.  -Per nurse, when she is off O2 even sitting in the bed and doing things with her arms, her O2 sats dropped into the mid 80s.  -Had a good bowel movement today with the polyethylene glycol-discussed with her that she should take it regularly after discharge.  -No chest pains or palpitations.  -She is afebrile, blood pressures on the high side-170s/80s.  Satting 95% on O2 at 2L/NC.  -Chest shows some increased expiratory phase but no wheezes or rhonchi  -Cardiac exam is a regular rhythm  -Blood sugars have been [id number] range.  HbA1c is 6.5  -Chemistry  "panel with normal electrolytes, BUN 15 with a creatinine of 0.69.  -CBC with a WBC of 8.5, H/H stable at 11.9/36.2.  Platelet count 350 K.      2/3/2024  -Less cough than yesterday, not bringing any sputum up.  Still dyspneic, but improving.  No fevers or chills.  -Had a good bowel movement this morning after the MiraLAX.  First time for many days.  -\"I thought I was ready to give up yesterday\".  We discussed the normal grieving process.  -She has a nebulizer at home but does not have home oxygen.  -She is afebrile, blood pressure is up some this morning, were good previously.  -Satting 95% on O2 at 2L/NC.  Per nurse, on room air she was satting in the 90s while at rest, but when she walked a few steps to the chair she dropped into the low 80s.  Patient does not have oxygen at home.  -In NSR on telemetry, no A-fib.  -Chest with some increased expiratory phase, no rales or rhonchi.  -Cardiac exam is a regular rhythm  -Chemistry panel with a blood sugar of 145, electrolytes otherwise normal.  Will continue to monitor her blood sugar  as she is on steroids.  -BUN 10 with a creatinine of 0.62.  -Magnesium 2.19.  -CBC with a WBC of 8.5, H/H 11.9/36.2.  Platelet count 350 K.    *These notes are being done using Dragon voice recognition technology and may include unintended errors with respect to translation of words, typographical errors or grammar errors which may not have been identified prior to finalization of the chart note.    CURRENT MEDICATIONS     Scheduled Medications:    Current Facility-Administered Medications:     acetaminophen (Tylenol) tablet 650 mg, 650 mg, oral, q4h PRN, Kary Garza MD, 650 mg at 02/03/24 2145    albuterol 2.5 mg /3 mL (0.083 %) nebulizer solution 2.5 mg, 2.5 mg, nebulization, BID, Kary Garza MD, 2.5 mg at 02/03/24 2036    [START ON 2/5/2024] alendronate (Fosamax) tablet 70 mg, 70 mg, oral, Weekly, Kary Garza MD    alum-mag hydroxide-simeth (Mylanta) 200-200-20 mg/5 mL oral suspension " 30 mL, 30 mL, oral, 4x daily PRN, Kary Garza MD    aspirin EC tablet 81 mg, 81 mg, oral, Daily, Kary Garza MD, 81 mg at 02/04/24 0827    atorvastatin (Lipitor) tablet 40 mg, 40 mg, oral, Nightly, Kary Garza MD, 40 mg at 02/03/24 2141    azithromycin (Zithromax) in dextrose 5 % in water (D5W) 250 mL  mg, 500 mg, intravenous, q24h, Kary Garza MD, Stopped at 02/03/24 1553    codeine-guaifenesin (Robitussin-AC)  mg/5 mL syrup 5 mL, 5 mL, oral, q4h PRN, Kary Garza MD, 5 mL at 02/03/24 2050    diclofenac sodium (Voltaren) 1 % gel 4 g, 4 g, Topical, 4x daily, Kary Garza MD, 4 g at 02/04/24 0535    enoxaparin (Lovenox) syringe 40 mg, 40 mg, subcutaneous, q24h, Kary Garza MD, 40 mg at 02/03/24 1227    [START ON 2/5/2024] ergocalciferol (Vitamin D-2) capsule 1,250 mcg, 1,250 mcg, oral, Weekly, Kary Garza MD    guaiFENesin (Mucinex) 12 hr tablet 600 mg, 600 mg, oral, BID, Kary Garza MD, 600 mg at 02/04/24 0828    hydroCHLOROthiazide (HYDRODiuril) tablet 12.5 mg, 12.5 mg, oral, Daily, Kary Garza MD, 12.5 mg at 02/04/24 0827    ipratropium-albuteroL (Duo-Neb) 0.5-2.5 mg/3 mL nebulizer solution 3 mL, 3 mL, nebulization, q2h PRN, Kary Garza MD, 3 mL at 02/04/24 0745    isosorbide mononitrate ER (Imdur) 24 hr tablet 30 mg, 30 mg, oral, Daily, Kary Garza MD, 30 mg at 02/04/24 0828    magnesium hydroxide (Milk of Magnesia) 2,400 mg/10 mL suspension 10 mL, 10 mL, oral, Daily PRN, Kary Garza MD    magnesium oxide (Mag-Ox) tablet 400 mg, 400 mg, oral, Daily, Kary Garza MD, 400 mg at 02/04/24 0828    melatonin tablet 5 mg, 5 mg, oral, Nightly PRN, Kary Garza MD, 5 mg at 02/02/24 2010    methylPREDNISolone sod succinate (PF) (SOLU-Medrol) 40 mg/mL injection 40 mg, 40 mg, intravenous, q12h, Kary Garza MD, 40 mg at 02/04/24 0829    metoprolol tartrate (Lopressor) tablet 25 mg, 25 mg, oral, BID, Kary Garza MD, 25 mg at 02/04/24 0827    nitroglycerin (Nitrostat) SL tablet 0.4 mg, 0.4 mg, sublingual, q5 min PRN,  "Kary Garza MD    ondansetron (Zofran) injection 4 mg, 4 mg, intravenous, q4h PRN, Kary Garza MD    pantoprazole (ProtoNix) EC tablet 40 mg, 40 mg, oral, Daily, Kary Garza MD, 40 mg at 02/04/24 0535    polyethylene glycol (Glycolax, Miralax) packet 17 g, 17 g, oral, Daily, Kary Garza MD, 17 g at 02/04/24 0827    potassium chloride CR (Klor-Con) ER tablet 10 mEq, 10 mEq, oral, Daily, Kary Garza MD, 10 mEq at 02/04/24 0828     PRN Medications:  PRN medications   Medication    acetaminophen    alum-mag hydroxide-simeth    codeine-guaifenesin    ipratropium-albuteroL    magnesium hydroxide    melatonin    nitroglycerin    ondansetron         IVs:        I&Os     Intake/Output last 3 Shifts:  I/O last 3 completed shifts:  In: 570 (6.4 mL/kg) [P.O.:320; IV Piggyback:250]  Out: 1325 (15 mL/kg) [Urine:1325 (0.4 mL/kg/hr)]  Weight: 88.4 kg     VITAL SIGNS     Blood pressure 175/82, pulse 75, temperature 36.7 °C (98.1 °F), temperature source Temporal, resp. rate 18, height 1.626 m (5' 4\"), weight 88.4 kg (194 lb 14.2 oz), SpO2 95 %.     /78 (BP Location: Right arm, Patient Position: Lying)   Pulse 72   Temp 36.7 °C (98.1 °F) (Temporal)   Resp 18   Ht 1.626 m (5' 4\")   Wt 88.4 kg (194 lb 14.2 oz)   SpO2 94%   BMI 33.45 kg/m²      PHYSICAL EXAM   Physical exam:  -General appearance: Patient is sitting up in bed alert.  Dyspneic, in NAD.  -Vital signs:  As above  -HEENT: Nasal O2 in place  -Neck: No JVD  -Chest: Improving breath sounds, no rales  -Cardiac: Regular rhythm  -Abdomen: Active bowel sounds  -Extremities: No edema  -Skin: No rash  -Neurologic:   Patient is alert and oriented x3.   -Behavior/Emotional:  Appropriate, cooperative    LABS   Relevant Results:  Results from last 7 days   Lab Units 02/04/24  0804 02/03/24  0625 02/02/24  0757   GLUCOSE mg/dL 118* 145* 126*        HbA1c:    Lab Results   Component Value Date    HGBA1C 6.5 (H) 02/03/2024     CBC:   Results from last 7 days   Lab Units " 02/03/24  0624 02/02/24  0757   WBC AUTO x10*3/uL 8.5 6.0   RBC AUTO x10*6/uL 4.05 4.13   HEMOGLOBIN g/dL 11.9* 11.9*   HEMATOCRIT % 36.2 36.9   MCV fL 89 89   MCH pg 29.4 28.8   MCHC g/dL 32.9 32.2   RDW % 15.0* 15.1*   PLATELETS AUTO x10*3/uL 350 307         CMP:    Results from last 7 days   Lab Units 02/04/24  0804 02/03/24  0625 02/02/24  0757   SODIUM mmol/L 137 137 141   POTASSIUM mmol/L 4.1 4.2 3.7   CHLORIDE mmol/L 99 102 105   CO2 mmol/L 30 27 25   BUN mg/dL 15 10 10   CREATININE mg/dL 0.69 0.62 0.63   GLUCOSE mg/dL 118* 145* 126*   PROTEIN TOTAL g/dL  --   --  7.0   CALCIUM mg/dL 9.3 9.2 9.0   BILIRUBIN TOTAL mg/dL  --   --  0.5   ALK PHOS U/L  --   --  112   AST U/L  --   --  22   ALT U/L  --   --  18       Magnesium:  Results from last 7 days   Lab Units 02/03/24  0625 02/02/24  0757   MAGNESIUM mg/dL 2.19 2.07       Troponin:    Results from last 7 days   Lab Units 02/02/24  0900 02/02/24  0757   TROPHS ng/L 3 3       INR:  @LABRCNT(INR:3)@  BNP:   Results from last 7 days   Lab Units 02/02/24  0757   BNP pg/mL 71           IMAGING     XR chest 1 view   Final Result   Cardiomegaly.        Stable bibasilar linear scarring.        No significant or acute interval change.        MACRO:   None        Signed by: Murphy Lemon 2/2/2024 8:14 AM   Dictation workstation:   [id number]        I spent 35 minutes in the professional and overall care of this patient.    Kary Garza MD

## 2024-02-05 LAB
ANION GAP SERPL CALC-SCNC: 12 MMOL/L (ref 10–20)
BUN SERPL-MCNC: 18 MG/DL (ref 6–23)
CALCIUM SERPL-MCNC: 9.3 MG/DL (ref 8.6–10.3)
CHLORIDE SERPL-SCNC: 99 MMOL/L (ref 98–107)
CO2 SERPL-SCNC: 30 MMOL/L (ref 21–32)
CREAT SERPL-MCNC: 0.64 MG/DL (ref 0.5–1.05)
EGFRCR SERPLBLD CKD-EPI 2021: 90 ML/MIN/1.73M*2
GLUCOSE SERPL-MCNC: 136 MG/DL (ref 74–99)
HOLD SPECIMEN: NORMAL
HOLD SPECIMEN: NORMAL
POTASSIUM SERPL-SCNC: 4.1 MMOL/L (ref 3.5–5.3)
SODIUM SERPL-SCNC: 137 MMOL/L (ref 136–145)

## 2024-02-05 PROCEDURE — 1200000002 HC GENERAL ROOM WITH TELEMETRY DAILY

## 2024-02-05 PROCEDURE — 2500000005 HC RX 250 GENERAL PHARMACY W/O HCPCS: Performed by: INTERNAL MEDICINE

## 2024-02-05 PROCEDURE — 80048 BASIC METABOLIC PNL TOTAL CA: CPT | Performed by: INTERNAL MEDICINE

## 2024-02-05 PROCEDURE — 36415 COLL VENOUS BLD VENIPUNCTURE: CPT | Performed by: INTERNAL MEDICINE

## 2024-02-05 PROCEDURE — 2500000002 HC RX 250 W HCPCS SELF ADMINISTERED DRUGS (ALT 637 FOR MEDICARE OP, ALT 636 FOR OP/ED): Performed by: INTERNAL MEDICINE

## 2024-02-05 PROCEDURE — 99232 SBSQ HOSP IP/OBS MODERATE 35: CPT | Performed by: INTERNAL MEDICINE

## 2024-02-05 PROCEDURE — 2500000004 HC RX 250 GENERAL PHARMACY W/ HCPCS (ALT 636 FOR OP/ED): Performed by: INTERNAL MEDICINE

## 2024-02-05 PROCEDURE — 94640 AIRWAY INHALATION TREATMENT: CPT

## 2024-02-05 PROCEDURE — 2500000001 HC RX 250 WO HCPCS SELF ADMINISTERED DRUGS (ALT 637 FOR MEDICARE OP): Performed by: INTERNAL MEDICINE

## 2024-02-05 RX ORDER — ALBUTEROL SULFATE 0.83 MG/ML
2.5 SOLUTION RESPIRATORY (INHALATION) 2 TIMES DAILY
Status: DISCONTINUED | OUTPATIENT
Start: 2024-02-06 | End: 2024-02-06 | Stop reason: HOSPADM

## 2024-02-05 RX ORDER — PREDNISONE 20 MG/1
20 TABLET ORAL DAILY
Status: DISCONTINUED | OUTPATIENT
Start: 2024-02-06 | End: 2024-02-06 | Stop reason: HOSPADM

## 2024-02-05 RX ADMIN — ASPIRIN 81 MG: 81 TABLET, COATED ORAL at 08:30

## 2024-02-05 RX ADMIN — AZITHROMYCIN MONOHYDRATE 500 MG: 500 INJECTION, POWDER, LYOPHILIZED, FOR SOLUTION INTRAVENOUS at 13:01

## 2024-02-05 RX ADMIN — ISOSORBIDE MONONITRATE 30 MG: 30 TABLET, EXTENDED RELEASE ORAL at 08:30

## 2024-02-05 RX ADMIN — POTASSIUM CHLORIDE 10 MEQ: 750 TABLET, EXTENDED RELEASE ORAL at 08:30

## 2024-02-05 RX ADMIN — METOPROLOL TARTRATE 25 MG: 25 TABLET, FILM COATED ORAL at 08:30

## 2024-02-05 RX ADMIN — GUAIFENESIN AND CODEINE PHOSPHATE 5 ML: 100; 10 SOLUTION ORAL at 13:03

## 2024-02-05 RX ADMIN — ATORVASTATIN CALCIUM 40 MG: 20 TABLET, FILM COATED ORAL at 19:53

## 2024-02-05 RX ADMIN — HYDROCHLOROTHIAZIDE 12.5 MG: 25 TABLET ORAL at 08:30

## 2024-02-05 RX ADMIN — ALBUTEROL SULFATE 2.5 MG: 2.5 SOLUTION RESPIRATORY (INHALATION) at 20:20

## 2024-02-05 RX ADMIN — POLYETHYLENE GLYCOL 3350 17 G: 17 POWDER, FOR SOLUTION ORAL at 08:29

## 2024-02-05 RX ADMIN — GUAIFENESIN AND CODEINE PHOSPHATE 5 ML: 100; 10 SOLUTION ORAL at 19:53

## 2024-02-05 RX ADMIN — PANTOPRAZOLE SODIUM 40 MG: 40 TABLET, DELAYED RELEASE ORAL at 06:29

## 2024-02-05 RX ADMIN — METHYLPREDNISOLONE SODIUM SUCCINATE 40 MG: 40 INJECTION, POWDER, FOR SOLUTION INTRAMUSCULAR; INTRAVENOUS at 08:32

## 2024-02-05 RX ADMIN — DICLOFENAC SODIUM TOPICAL GEL, 1% 4 G: 10 GEL TOPICAL at 06:30

## 2024-02-05 RX ADMIN — ALENDRONATE SODIUM 70 MG: 70 TABLET ORAL at 08:29

## 2024-02-05 RX ADMIN — MAGNESIUM OXIDE 400 MG (241.3 MG MAGNESIUM) TABLET 400 MG: TABLET at 08:30

## 2024-02-05 RX ADMIN — ALBUTEROL SULFATE 2.5 MG: 2.5 SOLUTION RESPIRATORY (INHALATION) at 05:36

## 2024-02-05 RX ADMIN — GUAIFENESIN 600 MG: 600 TABLET, EXTENDED RELEASE ORAL at 08:30

## 2024-02-05 RX ADMIN — Medication 3 L/MIN: at 05:36

## 2024-02-05 RX ADMIN — DICLOFENAC SODIUM TOPICAL GEL, 1% 4 G: 10 GEL TOPICAL at 17:00

## 2024-02-05 RX ADMIN — GUAIFENESIN 600 MG: 600 TABLET, EXTENDED RELEASE ORAL at 19:53

## 2024-02-05 RX ADMIN — METOPROLOL TARTRATE 25 MG: 25 TABLET, FILM COATED ORAL at 19:53

## 2024-02-05 RX ADMIN — ERGOCALCIFEROL 1250 MCG: 1.25 CAPSULE ORAL at 08:30

## 2024-02-05 RX ADMIN — DICLOFENAC SODIUM TOPICAL GEL, 1% 4 G: 10 GEL TOPICAL at 13:01

## 2024-02-05 ASSESSMENT — PAIN DESCRIPTION - ORIENTATION: ORIENTATION: RIGHT

## 2024-02-05 ASSESSMENT — COGNITIVE AND FUNCTIONAL STATUS - GENERAL
DAILY ACTIVITIY SCORE: 20
DRESSING REGULAR UPPER BODY CLOTHING: A LITTLE
HELP NEEDED FOR BATHING: A LITTLE
MOVING TO AND FROM BED TO CHAIR: A LITTLE
DRESSING REGULAR LOWER BODY CLOTHING: A LITTLE
STANDING UP FROM CHAIR USING ARMS: A LITTLE
TOILETING: A LITTLE
TURNING FROM BACK TO SIDE WHILE IN FLAT BAD: A LITTLE
CLIMB 3 TO 5 STEPS WITH RAILING: A LITTLE
TOILETING: A LITTLE
CLIMB 3 TO 5 STEPS WITH RAILING: A LITTLE
WALKING IN HOSPITAL ROOM: A LITTLE
DRESSING REGULAR LOWER BODY CLOTHING: A LITTLE
TOILETING: A LITTLE
MOVING TO AND FROM BED TO CHAIR: A LITTLE
DRESSING REGULAR UPPER BODY CLOTHING: A LITTLE
MOBILITY SCORE: 19
HELP NEEDED FOR BATHING: A LITTLE
WALKING IN HOSPITAL ROOM: A LITTLE
STANDING UP FROM CHAIR USING ARMS: A LITTLE
DRESSING REGULAR UPPER BODY CLOTHING: A LITTLE
HELP NEEDED FOR BATHING: A LITTLE
DAILY ACTIVITIY SCORE: 20
CLIMB 3 TO 5 STEPS WITH RAILING: A LITTLE
WALKING IN HOSPITAL ROOM: A LITTLE
MOBILITY SCORE: 19
MOVING TO AND FROM BED TO CHAIR: A LITTLE
TURNING FROM BACK TO SIDE WHILE IN FLAT BAD: A LITTLE
MOBILITY SCORE: 19
DAILY ACTIVITIY SCORE: 20
DRESSING REGULAR LOWER BODY CLOTHING: A LITTLE
STANDING UP FROM CHAIR USING ARMS: A LITTLE
TURNING FROM BACK TO SIDE WHILE IN FLAT BAD: A LITTLE

## 2024-02-05 ASSESSMENT — PAIN SCALES - GENERAL
PAINLEVEL_OUTOF10: 3
PAINLEVEL_OUTOF10: 0 - NO PAIN
PAINLEVEL_OUTOF10: 0 - NO PAIN

## 2024-02-05 ASSESSMENT — PAIN SCALES - WONG BAKER: WONGBAKER_NUMERICALRESPONSE: NO HURT

## 2024-02-05 ASSESSMENT — PAIN DESCRIPTION - LOCATION: LOCATION: KNEE

## 2024-02-05 ASSESSMENT — PAIN - FUNCTIONAL ASSESSMENT: PAIN_FUNCTIONAL_ASSESSMENT: 0-10

## 2024-02-05 NOTE — DISCHARGE INSTRUCTIONS
-You were admitted with a COPD exacerbation with low oxygen levels.  -You are being discharged home on oxygen.  -Use your nebulized treatments at least twice daily on a regular basis, if you get short of breath you can increase it to every 4 hours as needed.  -Make a follow-up appointment with Dr. Weber, the lung doctor after discharge.    -Use the diclofenac gel on your knee up to 4 times a day as needed.  Keep in mind that your knee pain may have also improved because of the steroids (prednisone) that you are on for your lungs.  I would wait till you have been off of the prednisone a while to make sure the knee pain does not recur before canceling your knee replacement.    -Use MiraLAX/polyethylene glycol daily on a regular basis so that you have normal bowel movements.  You can buy this over-the-counter.    -You are being started on an antidepressant called mirtazapine/Remeron-take it at night to help with your sleep.  It may also help with depression but that the side effects will take several weeks to show.    -Due to your memory concerns, we are going to stop your atorvastatin for several months to see if there is any improvement.

## 2024-02-05 NOTE — CARE PLAN
Problem: Safety  Goal: Patient will be injury free during hospitalization  Outcome: Progressing     The patient's goals for the shift include safety    The clinical goals for the shift include patient will not fall through out shift

## 2024-02-05 NOTE — TREATMENT PLAN
Pulse Oximetry on room air at rest: 88%    2.   Pulse Oximetry on room air during ambulation:     3.   Pulse Oximetry on oxygen during ambulation: 90% HR 77    4.   Amount of oxygen during ambulation: 4L via NC    5.   Pulse oximetry during recovery: 96% HR 73    6.   Amount of oxygen during recovery: 4L via NC    Does this patient qualify for home O2? Yes    What is the patient's Pulmonary Diagnosis: COPD    What is the patient's DME company: [email or web address]    Comments:

## 2024-02-05 NOTE — PROGRESS NOTES
"Rae Lundy is a 79 y.o. female on day 3 of admission presenting with COPD exacerbation (CMS/[facility]).      ASSESSMENT/PLAN   -Acute decompensation of COPD with hypoxia-improving with intensive nebulized treatments, steroids, azithromycin but still hypoxic off oxygen, will need home O2 at 4L/NC-History of paroxysmal atrial fibrillation not on anticoagulation due to GI blood loss in the past-no A-fib on telemetry since admission  -Constipation-improving with MiraLAX  -Hypertension-improving with resumption of her medications, was up again this morning before she received them.  -Mild steroid-induced hyperglycemia-blood sugars well-controlled, not requiring insulin.  Her HbA1c was 6.5.  -Some obvious memory impairment-unable to remember several prior hospitalizations and being on oxygen in the past.    -History of iron deficiency anemia due to angiodysplasia in the past-not on anticoagulation for the paroxysmal A-fib due to this.  -Hyperlipidemia-on atorvastatin  -Coronary artery disease-on isosorbide mononitrate, aspirin, and atorvastatin.  -History of Villeda's esophagus and prior GI bleeds-on pantoprazole.  -Osteoporosis on alendronate  -Grief-appropriate due to recent loss of her  ( in November)  -DJD of the right knee-scheduled for knee replacement in March.  Will try diclofenac gel    SUBJECTIVE/OBJECTIVE   24   -Cough is better, \"I do not know where I got this\".  Does not remember ever seeing a lung doctor, also does not remember being on oxygen in the past.  (But does not remember being hospitalized with COPD by me-2019, 2020-discharged on O2 then, 2023-also discharged on O2 then, 2023-also DC'd on oxygen then)  -She does admit that she has been short of breath for 2 years, but says she does not remember ever being diagnosed with COPD.  -Discussed with her that she should get a pulmonologist-she is willing.  -She is afebrile, blood pressure was up this morning.    -Chest with expiratory " "rhonchi and some wheezes, less cough and better air movement.  -Cardiac exam is a regular rhythm  -Blood sugars mildly elevated due to steroids, 136 this morning.  -Chemistry panel normal, BUN 18 and creatinine 0.64.  -Patient's O2 at rest 88%, requires 4L/NC at rest and walking.  -Prescription written out for the oxygen  -PD tomorrow.    2/4/2024  -Breathing is improving some, not coughing as much.  No nausea or vomiting.  Still gets very dyspneic with walking, but can walk further.  -Per nurse, when she is off O2 even sitting in the bed and doing things with her arms, her O2 sats dropped into the mid 80s.  -Had a good bowel movement today with the polyethylene glycol-discussed with her that she should take it regularly after discharge.  -No chest pains or palpitations.  -She is afebrile, blood pressures on the high side-170s/80s.  Satting 95% on O2 at 2L/NC.  -Chest shows some increased expiratory phase but no wheezes or rhonchi  -Cardiac exam is a regular rhythm  -Blood sugars have been dqegaauya-167-105 range.  HbA1c is 6.5  -Chemistry panel with normal electrolytes, BUN 15 with a creatinine of 0.69.  -CBC with a WBC of 8.5, H/H stable at 11.9/36.2.  Platelet count 350 K.      2/3/2024  -Less cough than yesterday, not bringing any sputum up.  Still dyspneic, but improving.  No fevers or chills.  -Had a good bowel movement this morning after the MiraLAX.  First time for many days.  -\"I thought I was ready to give up yesterday\".  We discussed the normal grieving process.  -She has a nebulizer at home but does not have home oxygen.  -She is afebrile, blood pressure is up some this morning, were good previously.  -Satting 95% on O2 at 2L/NC.  Per nurse, on room air she was satting in the 90s while at rest, but when she walked a few steps to the chair she dropped into the low 80s.  Patient does not have oxygen at home.  -In NSR on telemetry, no A-fib.  -Chest with some increased expiratory phase, no rales or " rhonchi.  -Cardiac exam is a regular rhythm  -Chemistry panel with a blood sugar of 145, electrolytes otherwise normal.  Will continue to monitor her blood sugar  as she is on steroids.  -BUN 10 with a creatinine of 0.62.  -Magnesium 2.19.  -CBC with a WBC of 8.5, H/H 11.9/36.2.  Platelet count 350 K.    *These notes are being done using Dragon voice recognition technology and may include unintended errors with respect to translation of words, typographical errors or grammar errors which may not have been identified prior to finalization of the chart note.    CURRENT MEDICATIONS     Scheduled Medications:    Current Facility-Administered Medications:     acetaminophen (Tylenol) tablet 650 mg, 650 mg, oral, q4h PRN, Kary Garza MD, 650 mg at 02/03/24 2145    albuterol 2.5 mg /3 mL (0.083 %) nebulizer solution 2.5 mg, 2.5 mg, nebulization, BID, Kary Garza MD, 2.5 mg at 02/05/24 0536    alendronate (Fosamax) tablet 70 mg, 70 mg, oral, Weekly, Kary Garza MD, 70 mg at 02/05/24 0829    alum-mag hydroxide-simeth (Mylanta) 200-200-20 mg/5 mL oral suspension 30 mL, 30 mL, oral, 4x daily PRN, Kary Garza MD    aspirin EC tablet 81 mg, 81 mg, oral, Daily, Kary Garza MD, 81 mg at 02/05/24 0830    atorvastatin (Lipitor) tablet 40 mg, 40 mg, oral, Nightly, Kary Garza MD, 40 mg at 02/04/24 2103    azithromycin (Zithromax) in dextrose 5 % in water (D5W) 250 mL  mg, 500 mg, intravenous, q24h, Kary Garza MD, Stopped at 02/04/24 1508    codeine-guaifenesin (Robitussin-AC)  mg/5 mL syrup 5 mL, 5 mL, oral, q4h PRN, Kary Garza MD, 5 mL at 02/04/24 2001    diclofenac sodium (Voltaren) 1 % gel 4 g, 4 g, Topical, 4x daily, Kary Garza MD, 4 g at 02/05/24 0630    enoxaparin (Lovenox) syringe 40 mg, 40 mg, subcutaneous, q24h, Kary Garza MD, 40 mg at 02/04/24 1053    ergocalciferol (Vitamin D-2) capsule 1,250 mcg, 1,250 mcg, oral, Weekly, Kary Garza MD, 1,250 mcg at 02/05/24 0830    guaiFENesin (Mucinex) 12 hr tablet 600  mg, 600 mg, oral, BID, Kary Garza MD, 600 mg at 02/05/24 0830    hydroCHLOROthiazide (HYDRODiuril) tablet 12.5 mg, 12.5 mg, oral, Daily, Kary Garza MD, 12.5 mg at 02/05/24 0830    ipratropium-albuteroL (Duo-Neb) 0.5-2.5 mg/3 mL nebulizer solution 3 mL, 3 mL, nebulization, q2h PRN, Kary Garza MD, 3 mL at 02/04/24 0745    isosorbide mononitrate ER (Imdur) 24 hr tablet 30 mg, 30 mg, oral, Daily, Kary Garza MD, 30 mg at 02/05/24 0830    magnesium hydroxide (Milk of Magnesia) 2,400 mg/10 mL suspension 10 mL, 10 mL, oral, Daily PRN, Kary Garza MD    magnesium oxide (Mag-Ox) tablet 400 mg, 400 mg, oral, Daily, Kary Garza MD, 400 mg at 02/05/24 0830    melatonin tablet 5 mg, 5 mg, oral, Nightly PRN, Kary Garza MD, 5 mg at 02/02/24 2010    methylPREDNISolone sod succinate (PF) (SOLU-Medrol) 40 mg/mL injection 40 mg, 40 mg, intravenous, q12h, Kary Garza MD, 40 mg at 02/05/24 0832    metoprolol tartrate (Lopressor) tablet 25 mg, 25 mg, oral, BID, Kary Garza MD, 25 mg at 02/05/24 0830    nitroglycerin (Nitrostat) SL tablet 0.4 mg, 0.4 mg, sublingual, q5 min PRN, Kary Garza MD    ondansetron (Zofran) injection 4 mg, 4 mg, intravenous, q4h PRN, Kary Garza MD    oxygen (O2) therapy, , inhalation, Continuous PRN - O2/gases, Kary Garza MD, 3 L/min at 02/05/24 0536    pantoprazole (ProtoNix) EC tablet 40 mg, 40 mg, oral, Daily, Kary Garza MD, 40 mg at 02/05/24 0629    polyethylene glycol (Glycolax, Miralax) packet 17 g, 17 g, oral, Daily, Kary Garza MD, 17 g at 02/05/24 0829    potassium chloride CR (Klor-Con) ER tablet 10 mEq, 10 mEq, oral, Daily, Kary Garza MD, 10 mEq at 02/05/24 0830     PRN Medications:  PRN medications   Medication    acetaminophen    alum-mag hydroxide-simeth    codeine-guaifenesin    ipratropium-albuteroL    magnesium hydroxide    melatonin    nitroglycerin    ondansetron    oxygen         IVs:        I&Os     Intake/Output last 3 Shifts:  I/O last 3 completed shifts:  In: 1800 (20.4 mL/kg)  "[P.O.:1300; IV Piggyback:500]  Out: 200 (2.3 mL/kg) [Urine:200 (0.1 mL/kg/hr)]  Weight: 88.4 kg     VITAL SIGNS     Blood pressure (!) 189/84, pulse 77, temperature 36.4 °C (97.5 °F), temperature source Temporal, resp. rate 16, height 1.626 m (5' 4\"), weight 88.4 kg (194 lb 14.2 oz), SpO2 92 %.     BP (!) 189/84 (BP Location: Left arm, Patient Position: Sitting)   Pulse 77   Temp 36.4 °C (97.5 °F) (Temporal)   Resp 16   Ht 1.626 m (5' 4\")   Wt 88.4 kg (194 lb 14.2 oz)   SpO2 92%   BMI 33.45 kg/m²      PHYSICAL EXAM   Physical exam:  -General appearance: Patient is sitting up in bed alert.  Less dyspneic, not coughing.  -Vital signs:  As above  -HEENT: Nasal O2 in place  -Neck: No JVD  -Chest: Improved air movement, some expiratory wheezes  -Cardiac: Regular rhythm  -Abdomen: Active bowel sounds  -Extremities: No edema  -Skin: No rash  -Neurologic:   Patient is alert and oriented x3, but has some memory problems and that she does not remember any of her prior hospitalizations for COPD.   -Behavior/Emotional:  Appropriate, cooperative    LABS   Relevant Results:  Results from last 7 days   Lab Units 02/05/24 0537 02/04/24  0804 02/03/24  0625   GLUCOSE mg/dL 136* 118* 145*        HbA1c:    Lab Results   Component Value Date    HGBA1C 6.5 (H) 02/03/2024     CBC:   Results from last 7 days   Lab Units 02/03/24  0624 02/02/24  0757   WBC AUTO x10*3/uL 8.5 6.0   RBC AUTO x10*6/uL 4.05 4.13   HEMOGLOBIN g/dL 11.9* 11.9*   HEMATOCRIT % 36.2 36.9   MCV fL 89 89   MCH pg 29.4 28.8   MCHC g/dL 32.9 32.2   RDW % 15.0* 15.1*   PLATELETS AUTO x10*3/uL 350 307         CMP:    Results from last 7 days   Lab Units 02/05/24  0537 02/04/24  0804 02/03/24  0625 02/02/24  0757   SODIUM mmol/L 137 137 137 141   POTASSIUM mmol/L 4.1 4.1 4.2 3.7   CHLORIDE mmol/L 99 99 102 105   CO2 mmol/L 30 30 27 25   BUN mg/dL 18 15 10 10   CREATININE mg/dL 0.64 0.69 0.62 0.63   GLUCOSE mg/dL 136* 118* 145* 126*   PROTEIN TOTAL g/dL  --   --   " --  7.0   CALCIUM mg/dL 9.3 9.3 9.2 9.0   BILIRUBIN TOTAL mg/dL  --   --   --  0.5   ALK PHOS U/L  --   --   --  112   AST U/L  --   --   --  22   ALT U/L  --   --   --  18       Magnesium:  Results from last 7 days   Lab Units 02/03/24  0625 02/02/24  0757   MAGNESIUM mg/dL 2.19 2.07       Troponin:    Results from last 7 days   Lab Units 02/02/24  0900 02/02/24  0757   TROPHS ng/L 3 3       INR:  @LABRCNT(INR:3)@  BNP:   Results from last 7 days   Lab Units 02/02/24  0757   BNP pg/mL 71           IMAGING     XR chest 1 view   Final Result   Cardiomegaly.        Stable bibasilar linear scarring.        No significant or acute interval change.        MACRO:   None        Signed by: Murphy Lemon 2/2/2024 8:14 AM   Dictation workstation:   [id number]          Kary Garza MD

## 2024-02-06 VITALS
HEIGHT: 64 IN | HEART RATE: 74 BPM | SYSTOLIC BLOOD PRESSURE: 116 MMHG | TEMPERATURE: 97.9 F | WEIGHT: 194.89 LBS | RESPIRATION RATE: 16 BRPM | BODY MASS INDEX: 33.27 KG/M2 | OXYGEN SATURATION: 95 % | DIASTOLIC BLOOD PRESSURE: 63 MMHG

## 2024-02-06 LAB
ANION GAP SERPL CALC-SCNC: 12 MMOL/L (ref 10–20)
BUN SERPL-MCNC: 19 MG/DL (ref 6–23)
CALCIUM SERPL-MCNC: 8.9 MG/DL (ref 8.6–10.3)
CHLORIDE SERPL-SCNC: 97 MMOL/L (ref 98–107)
CO2 SERPL-SCNC: 31 MMOL/L (ref 21–32)
CREAT SERPL-MCNC: 0.68 MG/DL (ref 0.5–1.05)
EGFRCR SERPLBLD CKD-EPI 2021: 89 ML/MIN/1.73M*2
GLUCOSE SERPL-MCNC: 89 MG/DL (ref 74–99)
HOLD SPECIMEN: NORMAL
HOLD SPECIMEN: NORMAL
POTASSIUM SERPL-SCNC: 3.6 MMOL/L (ref 3.5–5.3)
SODIUM SERPL-SCNC: 136 MMOL/L (ref 136–145)

## 2024-02-06 PROCEDURE — 2500000001 HC RX 250 WO HCPCS SELF ADMINISTERED DRUGS (ALT 637 FOR MEDICARE OP): Performed by: INTERNAL MEDICINE

## 2024-02-06 PROCEDURE — 94640 AIRWAY INHALATION TREATMENT: CPT

## 2024-02-06 PROCEDURE — 2500000002 HC RX 250 W HCPCS SELF ADMINISTERED DRUGS (ALT 637 FOR MEDICARE OP, ALT 636 FOR OP/ED): Performed by: INTERNAL MEDICINE

## 2024-02-06 PROCEDURE — 2500000004 HC RX 250 GENERAL PHARMACY W/ HCPCS (ALT 636 FOR OP/ED): Performed by: INTERNAL MEDICINE

## 2024-02-06 PROCEDURE — 80048 BASIC METABOLIC PNL TOTAL CA: CPT | Performed by: INTERNAL MEDICINE

## 2024-02-06 PROCEDURE — 36415 COLL VENOUS BLD VENIPUNCTURE: CPT | Performed by: INTERNAL MEDICINE

## 2024-02-06 PROCEDURE — 2500000005 HC RX 250 GENERAL PHARMACY W/O HCPCS: Performed by: INTERNAL MEDICINE

## 2024-02-06 PROCEDURE — 99239 HOSP IP/OBS DSCHRG MGMT >30: CPT | Performed by: INTERNAL MEDICINE

## 2024-02-06 RX ORDER — ALENDRONATE SODIUM 70 MG/1
70 TABLET ORAL
Start: 2024-02-06

## 2024-02-06 RX ORDER — MIRTAZAPINE 15 MG/1
15 TABLET, FILM COATED ORAL NIGHTLY
Qty: 30 TABLET | Refills: 0 | Status: SHIPPED | OUTPATIENT
Start: 2024-02-06 | End: 2024-03-07

## 2024-02-06 RX ORDER — PREDNISONE 20 MG/1
20 TABLET ORAL DAILY
Qty: 7 TABLET | Refills: 0 | Status: SHIPPED | OUTPATIENT
Start: 2024-02-06 | End: 2024-02-13

## 2024-02-06 RX ORDER — POLYETHYLENE GLYCOL 3350 17 G/17G
17 POWDER, FOR SOLUTION ORAL DAILY
Qty: 30 PACKET | Refills: 0 | Status: SHIPPED | OUTPATIENT
Start: 2024-02-06 | End: 2024-03-07

## 2024-02-06 RX ORDER — DICLOFENAC SODIUM 10 MG/G
4 GEL TOPICAL 4 TIMES DAILY PRN
Qty: 100 G | Refills: 1 | Status: ON HOLD | OUTPATIENT
Start: 2024-02-06 | End: 2024-02-23 | Stop reason: ALTCHOICE

## 2024-02-06 RX ORDER — MIRTAZAPINE 15 MG/1
15 TABLET, FILM COATED ORAL NIGHTLY
Status: DISCONTINUED | OUTPATIENT
Start: 2024-02-06 | End: 2024-02-06 | Stop reason: HOSPADM

## 2024-02-06 RX ORDER — GUAIFENESIN 600 MG/1
600 TABLET, EXTENDED RELEASE ORAL 2 TIMES DAILY
Qty: 60 TABLET | Refills: 0 | Status: SHIPPED | OUTPATIENT
Start: 2024-02-06 | End: 2024-03-07

## 2024-02-06 RX ADMIN — Medication 4 L/MIN: at 06:57

## 2024-02-06 RX ADMIN — PREDNISONE 20 MG: 20 TABLET ORAL at 08:38

## 2024-02-06 RX ADMIN — MAGNESIUM OXIDE 400 MG (241.3 MG MAGNESIUM) TABLET 400 MG: TABLET at 08:38

## 2024-02-06 RX ADMIN — ACETAMINOPHEN 650 MG: 325 TABLET ORAL at 05:38

## 2024-02-06 RX ADMIN — PANTOPRAZOLE SODIUM 40 MG: 40 TABLET, DELAYED RELEASE ORAL at 06:04

## 2024-02-06 RX ADMIN — ALBUTEROL SULFATE 2.5 MG: 2.5 SOLUTION RESPIRATORY (INHALATION) at 06:57

## 2024-02-06 RX ADMIN — AZITHROMYCIN MONOHYDRATE 500 MG: 500 INJECTION, POWDER, LYOPHILIZED, FOR SOLUTION INTRAVENOUS at 13:27

## 2024-02-06 RX ADMIN — DICLOFENAC SODIUM TOPICAL GEL, 1% 4 G: 10 GEL TOPICAL at 13:27

## 2024-02-06 RX ADMIN — POTASSIUM CHLORIDE 10 MEQ: 750 TABLET, EXTENDED RELEASE ORAL at 08:38

## 2024-02-06 RX ADMIN — ISOSORBIDE MONONITRATE 30 MG: 30 TABLET, EXTENDED RELEASE ORAL at 08:37

## 2024-02-06 RX ADMIN — METOPROLOL TARTRATE 25 MG: 25 TABLET, FILM COATED ORAL at 08:37

## 2024-02-06 RX ADMIN — ASPIRIN 81 MG: 81 TABLET, COATED ORAL at 08:36

## 2024-02-06 RX ADMIN — DICLOFENAC SODIUM TOPICAL GEL, 1% 4 G: 10 GEL TOPICAL at 17:00

## 2024-02-06 RX ADMIN — GUAIFENESIN 600 MG: 600 TABLET, EXTENDED RELEASE ORAL at 08:38

## 2024-02-06 RX ADMIN — HYDROCHLOROTHIAZIDE 12.5 MG: 25 TABLET ORAL at 08:38

## 2024-02-06 RX ADMIN — Medication 2 L/MIN: at 06:58

## 2024-02-06 RX ADMIN — ENOXAPARIN SODIUM 40 MG: 40 INJECTION SUBCUTANEOUS at 10:36

## 2024-02-06 ASSESSMENT — COGNITIVE AND FUNCTIONAL STATUS - GENERAL
CLIMB 3 TO 5 STEPS WITH RAILING: A LITTLE
HELP NEEDED FOR BATHING: A LITTLE
TOILETING: A LITTLE
WALKING IN HOSPITAL ROOM: A LITTLE
TURNING FROM BACK TO SIDE WHILE IN FLAT BAD: A LITTLE
STANDING UP FROM CHAIR USING ARMS: A LITTLE
MOVING TO AND FROM BED TO CHAIR: A LITTLE
MOVING FROM LYING ON BACK TO SITTING ON SIDE OF FLAT BED WITH BEDRAILS: A LITTLE
MOBILITY SCORE: 18
DAILY ACTIVITIY SCORE: 20
DRESSING REGULAR UPPER BODY CLOTHING: A LITTLE
DRESSING REGULAR LOWER BODY CLOTHING: A LITTLE

## 2024-02-06 ASSESSMENT — PAIN - FUNCTIONAL ASSESSMENT
PAIN_FUNCTIONAL_ASSESSMENT: 0-10

## 2024-02-06 ASSESSMENT — PAIN DESCRIPTION - LOCATION
LOCATION: HEAD
LOCATION: KNEE
LOCATION: KNEE

## 2024-02-06 ASSESSMENT — PAIN DESCRIPTION - ORIENTATION
ORIENTATION: RIGHT
ORIENTATION: RIGHT

## 2024-02-06 ASSESSMENT — PAIN SCALES - GENERAL
PAINLEVEL_OUTOF10: 3
PAINLEVEL_OUTOF10: 3
PAINLEVEL_OUTOF10: 0 - NO PAIN
PAINLEVEL_OUTOF10: 3

## 2024-02-06 NOTE — CONSULTS
Reason For Consult  COPD, Hypoxia  Rae Lundy is a 79 y.o. female with a known history of COPD, CAD, HTN, HLP, paroxysmal A-fib (not on anticoagulation due to prior GI bleed), history of TIA, history of iron deficiency anemia due to angiodysplasias, DJD, Villeda's esophagus, and prior TIA.  She presented to the ER with a several day history of shortness of breath and wheezing.  No fevers or chills.  Symptoms did not improve prove with increasing her nebulized treatments.  In the ER labs were normal.  Influenza and COVID-19 negative.  CXR showed stable bibasilar linear scarring.  Patient received methylprednisolone, magnesium, doxycycline IV, and several nebulized treatments, but continued to have significant symptoms and her O2 sat was 87 % off O2 so she was admitted.     Patient received intensive nebulized treatments, steroids, and azithromycin.  Her breathing symptoms gradually improved but she remained hypoxic on room air.  In the past she had been discharged on oxygen after COPD exacerbations, but she did not remember the prior hospitalizations or using oxygen in the past.  By the time of discharge her lungs were clear, but she required 4 L of O2 at rest and 5 L with ambulation.   She did have mild steroid-induced hyperglycemia and was placed on SSI did not not require significant coverage.  Her HbA1c was 6.5.     Her blood pressures were up some in the mornings until she would take her medication and then they would normalize.     Patient was complaining of significant problems with bowel movements and constipation-these resolved with regular polyethylene glycol.     Because of her right knee pain, diclofenac gel was given which gave her good relief.  She is being discharged with this.  She was thinking of canceling her knee replacement surgery and just using the gel, but I did tell her to wait until after she was done with the steroids before making that decision.     Patient also had some memory problems,  "she does admit that this is a problem for her.  She also has been having insomnia.  I spoke with her son, we are going to try stopping her atorvastatin to see if the memory improves, and starting her on mirtazapine to help with her sleep and possibly her grief/depression.      I was asked to evaluate and follow from a pulmonary perspective.  Chart reviewed patient examined obtained additional history from the patient.       SUBJECTIVE/OBJECTIVE   02/06/24   -Cough is better, \"I do not know where I got this\".  Does not remember ever seeing a lung doctor, also does not remember being on oxygen in the past.  (But does not remember being hospitalized with COPD by me-11/2019, 1/2020-discharged on O2 then, 2/2023-also discharged on O2 then, 5/2023-also DC'd on oxygen then)  -She does admit that she has been short of breath for 2 years, but says she does not remember ever being diagnosed with COPD..  -She is afebrile, blood pressure was up this morning.    -Chest with expiratory rhonchi and some wheezes, less cough and better air movement.  -Cardiac exam is a regular rhythm  -Blood sugars mildly elevated due to steroids, 136 this morning.  -Chemistry panel normal, BUN 18 and creatinine 0.64.  -Patient's O2 at rest 88%, requires 4L/NC at rest and walking.     CURRENT MEDICATIONS      Scheduled Medications:     Current Facility-Administered Medications:     acetaminophen (Tylenol) tablet 650 mg, 650 mg, oral, q4h PRN, Kary Garza MD, 650 mg at 02/03/24 2145    albuterol 2.5 mg /3 mL (0.083 %) nebulizer solution 2.5 mg, 2.5 mg, nebulization, BID, Kary Garza MD, 2.5 mg at 02/05/24 0536    alendronate (Fosamax) tablet 70 mg, 70 mg, oral, Weekly, Kary Garza MD, 70 mg at 02/05/24 0829    alum-mag hydroxide-simeth (Mylanta) 200-200-20 mg/5 mL oral suspension 30 mL, 30 mL, oral, 4x daily PRN, Kary Garza MD    aspirin EC tablet 81 mg, 81 mg, oral, Daily, Kary Garza MD, 81 mg at 02/05/24 0830    atorvastatin (Lipitor) tablet 40 " mg, 40 mg, oral, Nightly, Kary Garza MD, 40 mg at 02/04/24 2103    azithromycin (Zithromax) in dextrose 5 % in water (D5W) 250 mL  mg, 500 mg, intravenous, q24h, Kary Garza MD, Stopped at 02/04/24 1508    codeine-guaifenesin (Robitussin-AC)  mg/5 mL syrup 5 mL, 5 mL, oral, q4h PRN, Kary Garza MD, 5 mL at 02/04/24 2001    diclofenac sodium (Voltaren) 1 % gel 4 g, 4 g, Topical, 4x daily, Kary Garza MD, 4 g at 02/05/24 0630    enoxaparin (Lovenox) syringe 40 mg, 40 mg, subcutaneous, q24h, Kary Garza MD, 40 mg at 02/04/24 1053    ergocalciferol (Vitamin D-2) capsule 1,250 mcg, 1,250 mcg, oral, Weekly, Kary Garza MD, 1,250 mcg at 02/05/24 0830    guaiFENesin (Mucinex) 12 hr tablet 600 mg, 600 mg, oral, BID, Kary Garza MD, 600 mg at 02/05/24 0830    hydroCHLOROthiazide (HYDRODiuril) tablet 12.5 mg, 12.5 mg, oral, Daily, Kary Garza MD, 12.5 mg at 02/05/24 0830    ipratropium-albuteroL (Duo-Neb) 0.5-2.5 mg/3 mL nebulizer solution 3 mL, 3 mL, nebulization, q2h PRN, Kary Garza MD, 3 mL at 02/04/24 0745    isosorbide mononitrate ER (Imdur) 24 hr tablet 30 mg, 30 mg, oral, Daily, Kary Garza MD, 30 mg at 02/05/24 0830    magnesium hydroxide (Milk of Magnesia) 2,400 mg/10 mL suspension 10 mL, 10 mL, oral, Daily PRN, Kary Garza MD    magnesium oxide (Mag-Ox) tablet 400 mg, 400 mg, oral, Daily, Kary Garza MD, 400 mg at 02/05/24 0830    melatonin tablet 5 mg, 5 mg, oral, Nightly PRN, Kary Garza MD, 5 mg at 02/02/24 2010    methylPREDNISolone sod succinate (PF) (SOLU-Medrol) 40 mg/mL injection 40 mg, 40 mg, intravenous, q12h, Kary Garza MD, 40 mg at 02/05/24 0832    metoprolol tartrate (Lopressor) tablet 25 mg, 25 mg, oral, BID, Kary Garza MD, 25 mg at 02/05/24 0830    nitroglycerin (Nitrostat) SL tablet 0.4 mg, 0.4 mg, sublingual, q5 min PRN, Kary Garza MD    ondansetron (Zofran) injection 4 mg, 4 mg, intravenous, q4h PRN, Kary Garza MD    oxygen (O2) therapy, , inhalation, Continuous PRN - O2/gases,  "Kary Garza MD, 3 L/min at 02/05/24 0536    pantoprazole (ProtoNix) EC tablet 40 mg, 40 mg, oral, Daily, Kary Garza MD, 40 mg at 02/05/24 0629    polyethylene glycol (Glycolax, Miralax) packet 17 g, 17 g, oral, Daily, Kary Garza MD, 17 g at 02/05/24 0829    potassium chloride CR (Klor-Con) ER tablet 10 mEq, 10 mEq, oral, Daily, Kary Garza MD, 10 mEq at 02/05/24 0830      PRN Medications:      PRN medications   Medication    acetaminophen    alum-mag hydroxide-simeth    codeine-guaifenesin    ipratropium-albuteroL    magnesium hydroxide    melatonin    nitroglycerin    ondansetron    oxygen          IVs:         I&Os      Intake/Output last 3 Shifts:  I/O last 3 completed shifts:  In: 1800 (20.4 mL/kg) [P.O.:1300; IV Piggyback:500]  Out: 200 (2.3 mL/kg) [Urine:200 (0.1 mL/kg/hr)]  Weight: 88.4 kg      VITAL SIGNS      Blood pressure (!) 189/84, pulse 77, temperature 36.4 °C (97.5 °F), temperature source Temporal, resp. rate 16, height 1.626 m (5' 4\"), weight 88.4 kg (194 lb 14.2 oz), SpO2 92 %.      BP (!) 189/84 (BP Location: Left arm, Patient Position: Sitting)   Pulse 77   Temp 36.4 °C (97.5 °F) (Temporal)   Resp 16   Ht 1.626 m (5' 4\")   Wt 88.4 kg (194 lb 14.2 oz)   SpO2 92%   BMI 33.45 kg/m²       PHYSICAL EXAM   Physical exam:  -General appearance: Patient is sitting up in bed alert.  Less dyspneic, not coughing.  -Vital signs:  As above  -HEENT: Nasal O2 in place  -Neck: No JVD  -Chest: Improved air movement, some expiratory wheezes  -Cardiac: Regular rhythm  -Abdomen: Active bowel sounds  -Extremities: No edema  -Skin: No rash  -Neurologic:   Patient is alert and oriented x3, but has some memory problems and that she does not remember any of her prior hospitalizations for COPD.   -Behavior/Emotional:  Appropriate, cooperative     LABS   Relevant Results:         Results from last 7 days   Lab Units 02/05/24  0537 02/04/24  0804 02/03/24  0625   GLUCOSE mg/dL 136* 118* 145*         HbA1c:        "   Lab Results   Component Value Date     HGBA1C 6.5 (H) 02/03/2024      CBC:         Results from last 7 days   Lab Units 02/03/24  0624 02/02/24  0757   WBC AUTO x10*3/uL 8.5 6.0   RBC AUTO x10*6/uL 4.05 4.13   HEMOGLOBIN g/dL 11.9* 11.9*   HEMATOCRIT % 36.2 36.9   MCV fL 89 89   MCH pg 29.4 28.8   MCHC g/dL 32.9 32.2   RDW % 15.0* 15.1*   PLATELETS AUTO x10*3/uL 350 307            CMP:            Results from last 7 days   Lab Units 02/05/24  0537 02/04/24  0804 02/03/24  0625 02/02/24  0757   SODIUM mmol/L 137 137 137 141   POTASSIUM mmol/L 4.1 4.1 4.2 3.7   CHLORIDE mmol/L 99 99 102 105   CO2 mmol/L 30 30 27 25   BUN mg/dL 18 15 10 10   CREATININE mg/dL 0.64 0.69 0.62 0.63   GLUCOSE mg/dL 136* 118* 145* 126*   PROTEIN TOTAL g/dL  --   --   --  7.0   CALCIUM mg/dL 9.3 9.3 9.2 9.0   BILIRUBIN TOTAL mg/dL  --   --   --  0.5   ALK PHOS U/L  --   --   --  112   AST U/L  --   --   --  22   ALT U/L  --   --   --  18         Magnesium:        Results from last 7 days   Lab Units 02/03/24  0625 02/02/24  0757   MAGNESIUM mg/dL 2.19 2.07         Troponin:          Results from last 7 days   Lab Units 02/02/24  0900 02/02/24  0757   TROPHS ng/L 3 3         INR:  @LABRCNT(INR:3)@  BNP:        Results from last 7 days   Lab Units 02/02/24  0757   BNP pg/mL 71               IMAGING      XR chest 1 view   Final Result   Cardiomegaly.        ASSESSMENT/PLAN   -Acute decompensation of COPD with hypoxia-improving with intensive nebulized treatments, steroids, azithromycin but still hypoxic off oxygen, will need home O2 at 4L/NC-History of paroxysmal atrial fibrillation not on anticoagulation due to GI blood loss in the past-no A-fib on telemetry since admission  -Constipation-improving with MiraLAX  -Hypertension-improving with resumption of her medications, was up again this morning before she received them.  -Mild steroid-induced hyperglycemia-blood sugars well-controlled, not requiring insulin.  Her HbA1c was 6.5.  -Some  obvious memory impairment-unable to remember several prior hospitalizations and being on oxygen in the past.     -History of iron deficiency anemia due to angiodysplasia in the past-not on anticoagulation for the paroxysmal A-fib due to this.  -Hyperlipidemia-on atorvastatin  -Coronary artery disease-on isosorbide mononitrate, aspirin, and atorvastatin.  -History of Villeda's esophagus and prior GI bleeds-on pantoprazole.  -Osteoporosis on alendronate  -Grief-appropriate due to recent loss of her  ( in November)  -DJD of the right knee-scheduled for knee replacement in March.  Will try diclofenac gel      Pulmonary plan:-Agree with supplemental oxygen as ordered patient likely need home O2.  Agree  with IV steroids and bronchodilator nebs for COPD.  I shall continue to monitor this patient 3.  Patient will be followed up as an outpatient.  Thank you for the referral.  Discussed with Dr. Garza.    Mark Weber MD

## 2024-02-06 NOTE — DISCHARGE SUMMARY
DISCHARGE SUMMARY      Rae Lundy  Age:  79 y.o. female   :  1945  MRN:  [id number]    24    Date of admission:  2024     Date of discharge:  24     Attending physician at discharge:  Kary Garza MD     Discharge Diagnoses:  -COPD exacerbation (CMS/HCC) with hypoxia  -Hypertension  -Steroid-induced hyperglycemia  -Memory impairment  -Grief/depression  -Insomnia    Hospital Course:  Rae Lundy is a 79 y.o. female with a known history of COPD, CAD, HTN, HLP, paroxysmal A-fib (not on anticoagulation due to prior GI bleed), history of TIA, history of iron deficiency anemia due to angiodysplasias, DJD, Villeda's esophagus, and prior TIA.  She presented to the ER with a several day history of shortness of breath and wheezing.  No fevers or chills.  Symptoms did not improve prove with increasing her nebulized treatments.  In the ER labs were normal.  Influenza and COVID-19 negative.  CXR showed stable bibasilar linear scarring.  Patient received methylprednisolone, magnesium, doxycycline IV, and several nebulized treatments, but continued to have significant symptoms and her O2 sat was 87 % off O2 so she was admitted.    Patient received intensive nebulized treatments, steroids, and azithromycin.  Her breathing symptoms gradually improved but she remained hypoxic on room air.  In the past she had been discharged on oxygen after COPD exacerbations, but she did not remember the prior hospitalizations or using oxygen in the past.  By the time of discharge her lungs were clear, but she required 4 L of O2 at rest and 5 L with ambulation.  She was seen in consultation by Dr. Weber for follow-up as an outpatient for her COPD.  She did have mild steroid-induced hyperglycemia and was placed on SSI did not not require significant coverage.  Her HbA1c was 6.5.    Her blood pressures were up some in the mornings until she would take her medication  and then they would normalize.    Patient was complaining of significant problems with bowel movements and constipation-these resolved with regular polyethylene glycol.    Because of her right knee pain, diclofenac gel was given which gave her good relief.  She is being discharged with this.  She was thinking of canceling her knee replacement surgery and just using the gel, but I did tell her to wait until after she was done with the steroids before making that decision.    Patient also had some memory problems, she does admit that this is a problem for her.  She also has been having insomnia.  I spoke with her son, we are going to try stopping her atorvastatin to see if the memory improves, and starting her on mirtazapine to help with her sleep and possibly her grief/depression.    She should follow-up with her PCP about this.  Her grief from recent loss of her  may be contributing to this.    She should follow-up with her PCP,  Dr. Bhanu Diaz after discharge.  She should follow-up with Dr. Weber after discharge.    Procedures:  None    Problem list:  Problem List Items Addressed This Visit          Endocrine/Metabolic    Senile osteoporosis    Relevant Medications    alendronate (Fosamax) 70 mg tablet       Gastrointestinal and Abdominal    Constipation    Relevant Medications    polyethylene glycol (Glycolax, Miralax) 17 gram packet       Musculoskeletal and Injuries    Right knee DJD    Relevant Medications    diclofenac sodium (Voltaren) 1 % gel       Pulmonary and Pneumonias    Hypoxia    Relevant Medications    oxygen (O2) gas therapy    * (Principal) COPD exacerbation (CMS/[facility]) - Primary    Relevant Medications    guaiFENesin (Mucinex) 600 mg 12 hr tablet    oxygen (O2) gas therapy    predniSONE (Deltasone) 20 mg tablet     Other Visit Diagnoses       Degeneration, intervertebral disc, cervical        Relevant Medications    alendronate (Fosamax) 70 mg tablet             Disposition:   Home    Issues Requiring Follow-Up:  Memory concerns, knee pain    Condition on Discharge:  Satisfactory    Discharge medications:     Medication List       Medication List      START taking these medications     diclofenac sodium 1 % gel; Commonly known as: Voltaren; Apply 4.5 inches   (4 g) topically 4 times a day as needed (knee pain).   guaiFENesin 600 mg 12 hr tablet; Commonly known as: Mucinex; Take 1   tablet (600 mg) by mouth 2 times a day. Do not crush, chew, or split.   mirtazapine 15 mg tablet; Commonly known as: Remeron; Take 1 tablet (15   mg) by mouth once daily at bedtime. TO HELP WITH SLEEP AND DEPRESSION-CAN START WITH 1/2 TABLET FOR A WEEK IF MAKES YOU TOO SLEEPY THE NEXT DAY   oxygen gas therapy; Commonly known as: O2; Inhale 5 L/min once every 24   hours.   polyethylene glycol 17 gram packet; Commonly known as: Glycolax,   Miralax; Take 17 g by mouth once daily. FOR CONSTIPATION   predniSONE 20 mg tablet; Commonly known as: Deltasone; Take 1 tablet (20   mg) by mouth once daily for 7 days.     CONTINUE taking these medications     acetaminophen 650 mg ER tablet; Commonly known as: Tylenol 8 HOUR   alendronate 70 mg tablet; Commonly known as: Fosamax; Take 1 tablet (70   mg) by mouth 1 (one) time per week. Monday   aspirin 81 mg EC tablet   ergocalciferol 1.25 MG (25142 UT) capsule; Commonly known as: Vitamin   D-2   hydroCHLOROthiazide 12.5 mg capsule; Commonly known as: Microzide   * ibuprofen 200 mg tablet   * ibuprofen 600 mg tablet; Take 1 tablet (600 mg) by mouth every 6 hours   if needed for moderate pain (4 - 6) or mild pain (1 - 3).   ipratropium-albuteroL 0.5-2.5 mg/3 mL nebulizer solution; Commonly known   as: Duo-Neb   isosorbide mononitrate ER 30 mg 24 hr tablet; Commonly known as: Imdur   magnesium oxide 400 mg (241.3 mg magnesium) tablet; Commonly known as:   Mag-Ox   metoprolol tartrate 50 mg tablet; Commonly known as: Lopressor   MULTI VITAMIN ORAL   nitroglycerin 0.4 mg SL  "tablet; Commonly known as: Nitrostat   omeprazole 20 mg DR capsule; Commonly known as: PriLOSEC   potassium chloride CR 10 mEq ER tablet; Commonly known as: Klor-Con  * This list has 2 medication(s) that are the same as other medications   prescribed for you. Read the directions carefully, and ask your doctor or   other care provider to review them with you.     STOP taking these medications     atorvastatin 40 mg tablet; Commonly known as: Lipitor to see if memory improves                                                                          Discharge physical exam:  Vital signs: BP (!) 188/83 (BP Location: Left arm, Patient Position: Lying)   Pulse 68   Temp 36.2 °C (97.2 °F) (Temporal)   Resp 16   Ht 1.626 m (5' 4\")   Wt 88.4 kg (194 lb 14.2 oz)   SpO2 95%   BMI 33.45 kg/m²    At the time of discharge her lungs are now clear.  Cardiac exam is a regular rhythm, she is alert, oriented x 3, but does admit to some memory problems.    Test Results Pending At Discharge:  None     Code Status:  Full Code     Outpatient Follow-Up:  Future Appointments   Date Time Provider Department Center   2/8/2024  9:30 AM Manfred Fitzpatrick MD [id number] [place]   3/1/2024 10:20 AM Jamila Matute MD [id number] [place]   3/5/2024 11:30 AM Bhanu Diaz MD [id number] [place]       Kary Garza MD  02/06/24        *Some of this note was completed using Dragon voice recognition technology and may include unintended errors with respect to translation of words, typographical errors or grammar errors which may not have been identified prior to finalization of the chart note.  >31 minutes patient care/medication reconciliation/discharge coordination and discussion of discharge instructions & follow-up with the patient.      "

## 2024-02-06 NOTE — PROGRESS NOTES
"Rae Lundy is a 79 y.o. female on day 4 of admission presenting with COPD exacerbation (CMS/[facility]).      ASSESSMENT/PLAN   -Acute decompensation of COPD with hypoxia-improving with intensive nebulized treatments, steroids, azithromycin but still hypoxic off oxygen, will need home O2 at 4L/NC-History of paroxysmal atrial fibrillation not on anticoagulation due to GI blood loss in the past-no A-fib on telemetry since admission  -Constipation-improving with MiraLAX  -Hypertension-improving with resumption of her medications, was up again this morning before she received them.  -Mild steroid-induced hyperglycemia-blood sugars well-controlled, not requiring insulin.  Her HbA1c was 6.5.  -Some obvious memory impairment-she still does not being discharged on oxygen with remember several prior hospitalizations.    -History of iron deficiency anemia due to angiodysplasia in the past-not on anticoagulation for the paroxysmal A-fib due to this.  -Hyperlipidemia-on atorvastatin  -Coronary artery disease-on isosorbide mononitrate, aspirin, and atorvastatin.  -History of Villeda's esophagus and prior GI bleeds-on pantoprazole.  -Osteoporosis on alendronate  -Grief-appropriate due to recent loss of her  ( in November).  May be contributing to her memory deficit  -DJD of the right knee-scheduled for knee replacement in March.  Significant symptom improvement with diclofenac gel, but also has been on steroids.    SUBJECTIVE/OBJECTIVE   24   -Breathing is much better.  Able to walk in the room with oxygen on without problems.  -Tolerating the prednisone well (was listed as an allergy).  -The diclofenac gel \"is wonderful\"-no longer having knee pain.  She thinks she may cancel her knee replacement.  -When I discussed with her that I had discharged her on oxygen several times in the past, she does not remember that.  She does acknowledge the history of COPD and uses her nebulizer at home.  When I asked if she had " "noticed any memory problems she said \"definitely\".  -I tried to call her son, Eleazar, at [phone] updated him on her treatment, make sure that the oxygen was delivered last night, updated him about her knee, and discuss her memory.  -She is afebrile, again her blood pressure is up in the morning but better during the afternoons.  Satting 95% on O2 at 4L/NC.  -Patient is standing up in the room walking to the sink-lungs are clear with no wheezes.  -Cardiac exam is a regular rhythm  -Chemistry panel with a blood sugar of 89, electrolytes normal.  BUN 19 with a creatinine of 0.68.  -Blood sugars have been well-controlled, not requiring SSI.  -Will discharge today.    2/5/2024  -Cough is better, \"I do not know where I got this\".  Does not remember ever seeing a lung doctor, also does not remember being on oxygen in the past.  (But does not remember being hospitalized with COPD by me-11/2019, 1/2020-discharged on O2 then, 2/2023-also discharged on O2 then, 5/2023-also DC'd on oxygen then)  -She does admit that she has been short of breath for 2 years, but says she does not remember ever being diagnosed with COPD.  -Discussed with her that she should get a pulmonologist-she is willing.  -She is afebrile, blood pressure was up this morning.    -Chest with expiratory rhonchi and some wheezes, less cough and better air movement.  -Cardiac exam is a regular rhythm  -Blood sugars mildly elevated due to steroids, 136 this morning.  -Chemistry panel normal, BUN 18 and creatinine 0.64.  -Patient's O2 at rest 88%, requires 4L/NC at rest and walking.  -Prescription written out for the oxygen  -PD tomorrow.    2/4/2024  -Breathing is improving some, not coughing as much.  No nausea or vomiting.  Still gets very dyspneic with walking, but can walk further.  -Per nurse, when she is off O2 even sitting in the bed and doing things with her arms, her O2 sats dropped into the mid 80s.  -Had a good bowel movement today with the " "polyethylene glycol-discussed with her that she should take it regularly after discharge.  -No chest pains or palpitations.  -She is afebrile, blood pressures on the high side-170s/80s.  Satting 95% on O2 at 2L/NC.  -Chest shows some increased expiratory phase but no wheezes or rhonchi  -Cardiac exam is a regular rhythm  -Blood sugars have been [id number] range.  HbA1c is 6.5  -Chemistry panel with normal electrolytes, BUN 15 with a creatinine of 0.69.  -CBC with a WBC of 8.5, H/H stable at 11.9/36.2.  Platelet count 350 K.      2/3/2024  -Less cough than yesterday, not bringing any sputum up.  Still dyspneic, but improving.  No fevers or chills.  -Had a good bowel movement this morning after the MiraLAX.  First time for many days.  -\"I thought I was ready to give up yesterday\".  We discussed the normal grieving process.  -She has a nebulizer at home but does not have home oxygen.  -She is afebrile, blood pressure is up some this morning, were good previously.  -Satting 95% on O2 at 2L/NC.  Per nurse, on room air she was satting in the 90s while at rest, but when she walked a few steps to the chair she dropped into the low 80s.  Patient does not have oxygen at home.  -In NSR on telemetry, no A-fib.  -Chest with some increased expiratory phase, no rales or rhonchi.  -Cardiac exam is a regular rhythm  -Chemistry panel with a blood sugar of 145, electrolytes otherwise normal.  Will continue to monitor her blood sugar  as she is on steroids.  -BUN 10 with a creatinine of 0.62.  -Magnesium 2.19.  -CBC with a WBC of 8.5, H/H 11.9/36.2.  Platelet count 350 K.    *These notes are being done using Dragon voice recognition technology and may include unintended errors with respect to translation of words, typographical errors or grammar errors which may not have been identified prior to finalization of the chart note.    CURRENT MEDICATIONS     Scheduled Medications:    Current Facility-Administered Medications:     " acetaminophen (Tylenol) tablet 650 mg, 650 mg, oral, q4h PRN, Kary Garza MD, 650 mg at 02/06/24 0538    albuterol 2.5 mg /3 mL (0.083 %) nebulizer solution 2.5 mg, 2.5 mg, nebulization, BID, Kary Garza MD, 2.5 mg at 02/06/24 0657    alendronate (Fosamax) tablet 70 mg, 70 mg, oral, Weekly, Kary Garza MD, 70 mg at 02/05/24 0829    alum-mag hydroxide-simeth (Mylanta) 200-200-20 mg/5 mL oral suspension 30 mL, 30 mL, oral, 4x daily PRN, Kary Garza MD    aspirin EC tablet 81 mg, 81 mg, oral, Daily, Kary Garza MD, 81 mg at 02/06/24 0836    atorvastatin (Lipitor) tablet 40 mg, 40 mg, oral, Nightly, Kary Garza MD, 40 mg at 02/05/24 1953    azithromycin (Zithromax) in dextrose 5 % in water (D5W) 250 mL  mg, 500 mg, intravenous, q24h, Kary Garza MD, Stopped at 02/05/24 1401    codeine-guaifenesin (Robitussin-AC)  mg/5 mL syrup 5 mL, 5 mL, oral, q4h PRN, Kary Garza MD, 5 mL at 02/05/24 1953    diclofenac sodium (Voltaren) 1 % gel 4 g, 4 g, Topical, 4x daily, Kary Garza MD, 4 g at 02/05/24 1700    enoxaparin (Lovenox) syringe 40 mg, 40 mg, subcutaneous, q24h, Kary Garza MD, 40 mg at 02/06/24 1036    ergocalciferol (Vitamin D-2) capsule 1,250 mcg, 1,250 mcg, oral, Weekly, Kary Garza MD, 1,250 mcg at 02/05/24 0830    guaiFENesin (Mucinex) 12 hr tablet 600 mg, 600 mg, oral, BID, Kary Garza MD, 600 mg at 02/06/24 0838    hydroCHLOROthiazide (HYDRODiuril) tablet 12.5 mg, 12.5 mg, oral, Daily, Kary Garza MD, 12.5 mg at 02/06/24 0838    ipratropium-albuteroL (Duo-Neb) 0.5-2.5 mg/3 mL nebulizer solution 3 mL, 3 mL, nebulization, q2h PRN, Kary Garza MD, 3 mL at 02/04/24 0745    isosorbide mononitrate ER (Imdur) 24 hr tablet 30 mg, 30 mg, oral, Daily, Kary Garza MD, 30 mg at 02/06/24 0837    magnesium hydroxide (Milk of Magnesia) 2,400 mg/10 mL suspension 10 mL, 10 mL, oral, Daily PRN, Kary Garza MD    magnesium oxide (Mag-Ox) tablet 400 mg, 400 mg, oral, Daily, Kary Garza MD, 400 mg at 02/06/24 0838     "melatonin tablet 5 mg, 5 mg, oral, Nightly PRN, Kary Garza MD, 5 mg at 02/02/24 2010    metoprolol tartrate (Lopressor) tablet 25 mg, 25 mg, oral, BID, Kary Garza MD, 25 mg at 02/06/24 0837    nitroglycerin (Nitrostat) SL tablet 0.4 mg, 0.4 mg, sublingual, q5 min PRN, Kary Garza MD    ondansetron (Zofran) injection 4 mg, 4 mg, intravenous, q4h PRN, Kary Garza MD    oxygen (O2) therapy, , inhalation, Continuous PRN - O2/gases, Kary Garza MD, 2 L/min at 02/06/24 0658    pantoprazole (ProtoNix) EC tablet 40 mg, 40 mg, oral, Daily, Kary Garza MD, 40 mg at 02/06/24 0604    polyethylene glycol (Glycolax, Miralax) packet 17 g, 17 g, oral, Daily, Kary Garza MD, 17 g at 02/05/24 0829    potassium chloride CR (Klor-Con) ER tablet 10 mEq, 10 mEq, oral, Daily, Kary Garza MD, 10 mEq at 02/06/24 0838    predniSONE (Deltasone) tablet 20 mg, 20 mg, oral, Daily, Kary Garza MD, 20 mg at 02/06/24 0838     PRN Medications:  PRN medications   Medication    acetaminophen    alum-mag hydroxide-simeth    codeine-guaifenesin    ipratropium-albuteroL    magnesium hydroxide    melatonin    nitroglycerin    ondansetron    oxygen         IVs:        I&Os     Intake/Output last 3 Shifts:  I/O last 3 completed shifts:  In: 1200 (13.6 mL/kg) [P.O.:700; IV Piggyback:500]  Out: - (0 mL/kg)   Weight: 88.4 kg     VITAL SIGNS     Blood pressure (!) 188/83, pulse 68, temperature 36.2 °C (97.2 °F), temperature source Temporal, resp. rate 16, height 1.626 m (5' 4\"), weight 88.4 kg (194 lb 14.2 oz), SpO2 95 %.     BP (!) 188/83 (BP Location: Left arm, Patient Position: Lying)   Pulse 68   Temp 36.2 °C (97.2 °F) (Temporal)   Resp 16   Ht 1.626 m (5' 4\")   Wt 88.4 kg (194 lb 14.2 oz)   SpO2 95%   BMI 33.45 kg/m²      PHYSICAL EXAM   Physical exam:  -General appearance: Patient is standing in the room after walking to the sink,.  -Vital signs:  As above  -HEENT: Nasal O2 in place  -Neck: No JVD  -Chest: Lungs are clear  -Cardiac: Regular " rhythm  -Abdomen: Active bowel sounds  -Extremities: No edema  -Skin: No rash  -Neurologic:   Patient is alert and oriented x3, she does admit to some memory problems.   -Behavior/Emotional:  Appropriate, cooperative    LABS   Relevant Results:  Results from last 7 days   Lab Units 02/06/24  0632 02/05/24  0537 02/04/24  0804   GLUCOSE mg/dL 89 136* 118*        HbA1c:    Lab Results   Component Value Date    HGBA1C 6.5 (H) 02/03/2024     CBC:   Results from last 7 days   Lab Units 02/03/24  0624 02/02/24  0757   WBC AUTO x10*3/uL 8.5 6.0   RBC AUTO x10*6/uL 4.05 4.13   HEMOGLOBIN g/dL 11.9* 11.9*   HEMATOCRIT % 36.2 36.9   MCV fL 89 89   MCH pg 29.4 28.8   MCHC g/dL 32.9 32.2   RDW % 15.0* 15.1*   PLATELETS AUTO x10*3/uL 350 307         CMP:    Results from last 7 days   Lab Units 02/06/24  0632 02/05/24  0537 02/04/24  0804 02/03/24  0625 02/02/24  0757   SODIUM mmol/L 136 137 137   < > 141   POTASSIUM mmol/L 3.6 4.1 4.1   < > 3.7   CHLORIDE mmol/L 97* 99 99   < > 105   CO2 mmol/L 31 30 30   < > 25   BUN mg/dL 19 18 15   < > 10   CREATININE mg/dL 0.68 0.64 0.69   < > 0.63   GLUCOSE mg/dL 89 136* 118*   < > 126*   PROTEIN TOTAL g/dL  --   --   --   --  7.0   CALCIUM mg/dL 8.9 9.3 9.3   < > 9.0   BILIRUBIN TOTAL mg/dL  --   --   --   --  0.5   ALK PHOS U/L  --   --   --   --  112   AST U/L  --   --   --   --  22   ALT U/L  --   --   --   --  18    < > = values in this interval not displayed.       Magnesium:  Results from last 7 days   Lab Units 02/03/24  0625 02/02/24  0757   MAGNESIUM mg/dL 2.19 2.07       Troponin:    Results from last 7 days   Lab Units 02/02/24  0900 02/02/24  0757   TROPHS ng/L 3 3       INR:  @LABRCNT(INR:3)@  BNP:   Results from last 7 days   Lab Units 02/02/24  0757   BNP pg/mL 71           IMAGING     XR chest 1 view   Final Result   Cardiomegaly.        Stable bibasilar linear scarring.        No significant or acute interval change.        MACRO:   None        Signed by: Murphy Lemon  2/2/2024 8:14 AM   Dictation workstation:   [id number]          Kary Garza MD

## 2024-02-06 NOTE — CARE PLAN
The patient's goals for the shift include safety    The clinical goals for the shift include safety     Problem: Pain  Goal: My pain/discomfort is manageable  Outcome: Progressing     Problem: Safety  Goal: Patient will be injury free during hospitalization  Outcome: Progressing  Goal: I will remain free of falls  Outcome: Progressing     Problem: Daily Care  Goal: Daily care needs are met  Outcome: Progressing     Problem: Psychosocial Needs  Goal: Demonstrates ability to cope with hospitalization/illness  Outcome: Progressing  Goal: Collaborate with me, my family, and caregiver to identify my specific goals  Outcome: Progressing     Problem: Discharge Barriers  Goal: My discharge needs are met  Outcome: Progressing     Problem: Fall/Injury  Goal: Not fall by end of shift  Outcome: Progressing  Goal: Be free from injury by end of the shift  Outcome: Progressing  Goal: Verbalize understanding of personal risk factors for fall in the hospital  Outcome: Progressing  Goal: Verbalize understanding of risk factor reduction measures to prevent injury from fall in the home  Outcome: Progressing  Goal: Use assistive devices by end of the shift  Outcome: Progressing  Goal: Pace activities to prevent fatigue by end of the shift  Outcome: Progressing     Problem: Respiratory  Goal: Clear secretions with interventions this shift  Outcome: Progressing  Goal: Minimize anxiety/maximize coping throughout shift  Outcome: Progressing  Goal: Minimal/no exertional discomfort or dyspnea this shift  Outcome: Progressing  Goal: No signs of respiratory distress (eg. Use of accessory muscles. Peds grunting)  Outcome: Progressing  Goal: Patent airway maintained this shift  Outcome: Progressing  Goal: Tolerate mechanical ventilation evidenced by VS/agitation level this shift  Outcome: Progressing  Goal: Tolerate pulmonary toileting this shift  Outcome: Progressing  Goal: Verbalize decreased shortness of breath this shift  Outcome:  Progressing  Goal: Wean oxygen to maintain O2 saturation per order/standard this shift  Outcome: Progressing  Goal: Increase self care and/or family involvement in next 24 hours  Outcome: Progressing     Problem: Skin  Goal: Decreased wound size/increased tissue granulation at next dressing change  Outcome: Progressing  Goal: Participates in plan/prevention/treatment measures  Outcome: Progressing  Goal: Prevent/manage excess moisture  Outcome: Progressing  Goal: Prevent/minimize sheer/friction injuries  Outcome: Progressing  Goal: Promote/optimize nutrition  Outcome: Progressing  Goal: Promote skin healing  Outcome: Progressing

## 2024-02-06 NOTE — SIGNIFICANT EVENT
I spoke with her son, Eleazar, [phone] about his mother.  We discussed several concerns:  -I discussed my concerns about her memory, family has also noted this.  It is primarily short-term memory and repeating things she has already told them as well as some long-term memory.  He has reviewed some of her medications and the one that was noted to possibly cause memory problems (most likely the atorvastatin).  He also thinks that she is depressed as expected after losing her .  He tells me that several years ago she had an evaluation for memory by a neurologist, he does not remember the neurologist name.  I advised him to get a repeat evaluation done.  We will also try stopping her atorvastatin to see if that improves her memory.  -He notes that she has been taking Tylenol PM because of problems with sleeping.  I advised him that Benadryl is not good in the elderly.  Because he also thinks she is depressed, we will trial mirtazapine/Remeron which may also help her appetite.  -I told him that she thought her knee pain was significantly better with the diclofenac gel, but then it also may be partially due to the steroids.  He also notes that she has not been up walking much since she has been in the hospital and that may be 1 reason it has improved.  They will continue to see how well she does with the diclofenac gel after discharge when she is up and more active.  I did advise the patient not to cancel her knee surgery until she had been off the steroids.  -Oxygen-the patient was called by the oxygen company last night, and he was told they would call him back about delivering the oxygen but he never got a call back.  The nurse is obtaining the oxygen company phone number for him to contact so that oxygen will be available when the patient gets home.

## 2024-02-07 ENCOUNTER — APPOINTMENT (OUTPATIENT)
Dept: RADIOLOGY | Facility: HOSPITAL | Age: 79
End: 2024-02-07
Payer: MEDICARE

## 2024-02-07 ENCOUNTER — PATIENT OUTREACH (OUTPATIENT)
Dept: PRIMARY CARE | Facility: CLINIC | Age: 79
End: 2024-02-07
Payer: MEDICARE

## 2024-02-07 ENCOUNTER — DOCUMENTATION (OUTPATIENT)
Dept: PRIMARY CARE | Facility: CLINIC | Age: 79
End: 2024-02-07
Payer: MEDICARE

## 2024-02-07 ENCOUNTER — APPOINTMENT (OUTPATIENT)
Dept: CARDIOLOGY | Facility: HOSPITAL | Age: 79
End: 2024-02-07
Payer: MEDICARE

## 2024-02-07 ENCOUNTER — HOSPITAL ENCOUNTER (EMERGENCY)
Facility: HOSPITAL | Age: 79
Discharge: HOME | End: 2024-02-07
Attending: EMERGENCY MEDICINE
Payer: MEDICARE

## 2024-02-07 VITALS
WEIGHT: 200 LBS | RESPIRATION RATE: 18 BRPM | BODY MASS INDEX: 34.15 KG/M2 | HEART RATE: 85 BPM | HEIGHT: 64 IN | DIASTOLIC BLOOD PRESSURE: 72 MMHG | TEMPERATURE: 98.4 F | SYSTOLIC BLOOD PRESSURE: 147 MMHG | OXYGEN SATURATION: 94 %

## 2024-02-07 DIAGNOSIS — T38.0X5A STEROID-INDUCED HYPERGLYCEMIA: ICD-10-CM

## 2024-02-07 DIAGNOSIS — F32.A DEPRESSION, UNSPECIFIED DEPRESSION TYPE: ICD-10-CM

## 2024-02-07 DIAGNOSIS — R73.9 STEROID-INDUCED HYPERGLYCEMIA: ICD-10-CM

## 2024-02-07 DIAGNOSIS — R07.9 CHEST PAIN, UNSPECIFIED TYPE: Primary | ICD-10-CM

## 2024-02-07 DIAGNOSIS — G47.00 INSOMNIA, UNSPECIFIED TYPE: ICD-10-CM

## 2024-02-07 DIAGNOSIS — I10 HYPERTENSION, UNSPECIFIED TYPE: ICD-10-CM

## 2024-02-07 DIAGNOSIS — R11.0 NAUSEA: ICD-10-CM

## 2024-02-07 DIAGNOSIS — R09.02 HYPOXIA: ICD-10-CM

## 2024-02-07 DIAGNOSIS — F43.21 GRIEF REACTION: ICD-10-CM

## 2024-02-07 DIAGNOSIS — J44.1 COPD EXACERBATION (MULTI): ICD-10-CM

## 2024-02-07 DIAGNOSIS — R41.3 MEMORY IMPAIRMENT: ICD-10-CM

## 2024-02-07 DIAGNOSIS — R06.02 SHORTNESS OF BREATH: ICD-10-CM

## 2024-02-07 LAB
ALBUMIN SERPL BCP-MCNC: 4 G/DL (ref 3.4–5)
ALP SERPL-CCNC: 89 U/L (ref 33–136)
ALT SERPL W P-5'-P-CCNC: 24 U/L (ref 7–45)
ANION GAP SERPL CALC-SCNC: 14 MMOL/L (ref 10–20)
APTT PPP: 25 SECONDS (ref 27–38)
AST SERPL W P-5'-P-CCNC: 21 U/L (ref 9–39)
ATRIAL RATE: 75 BPM
ATRIAL RATE: 79 BPM
BASOPHILS # BLD AUTO: 0.02 X10*3/UL (ref 0–0.1)
BASOPHILS NFR BLD AUTO: 0.1 %
BILIRUB SERPL-MCNC: 0.5 MG/DL (ref 0–1.2)
BNP SERPL-MCNC: 31 PG/ML (ref 0–99)
BUN SERPL-MCNC: 26 MG/DL (ref 6–23)
CALCIUM SERPL-MCNC: 9 MG/DL (ref 8.6–10.3)
CARDIAC TROPONIN I PNL SERPL HS: 5 NG/L (ref 0–13)
CARDIAC TROPONIN I PNL SERPL HS: 5 NG/L (ref 0–13)
CHLORIDE SERPL-SCNC: 97 MMOL/L (ref 98–107)
CO2 SERPL-SCNC: 29 MMOL/L (ref 21–32)
CREAT SERPL-MCNC: 0.82 MG/DL (ref 0.5–1.05)
D DIMER PPP FEU-MCNC: 715 NG/ML FEU
EGFRCR SERPLBLD CKD-EPI 2021: 73 ML/MIN/1.73M*2
EOSINOPHIL # BLD AUTO: 0.11 X10*3/UL (ref 0–0.4)
EOSINOPHIL NFR BLD AUTO: 0.7 %
ERYTHROCYTE [DISTWIDTH] IN BLOOD BY AUTOMATED COUNT: 14.5 % (ref 11.5–14.5)
FLUAV RNA RESP QL NAA+PROBE: NOT DETECTED
FLUBV RNA RESP QL NAA+PROBE: NOT DETECTED
GLUCOSE SERPL-MCNC: 109 MG/DL (ref 74–99)
HCT VFR BLD AUTO: 41.4 % (ref 36–46)
HGB BLD-MCNC: 13.8 G/DL (ref 12–16)
HOLD SPECIMEN: NORMAL
IMM GRANULOCYTES # BLD AUTO: 0.14 X10*3/UL (ref 0–0.5)
IMM GRANULOCYTES NFR BLD AUTO: 0.9 % (ref 0–0.9)
INR PPP: 1 (ref 0.9–1.1)
LIPASE SERPL-CCNC: 74 U/L (ref 9–82)
LYMPHOCYTES # BLD AUTO: 3.99 X10*3/UL (ref 0.8–3)
LYMPHOCYTES NFR BLD AUTO: 25.4 %
MAGNESIUM SERPL-MCNC: 1.93 MG/DL (ref 1.6–2.4)
MCH RBC QN AUTO: 29.1 PG (ref 26–34)
MCHC RBC AUTO-ENTMCNC: 33.3 G/DL (ref 32–36)
MCV RBC AUTO: 87 FL (ref 80–100)
MONOCYTES # BLD AUTO: 1.57 X10*3/UL (ref 0.05–0.8)
MONOCYTES NFR BLD AUTO: 10 %
NEUTROPHILS # BLD AUTO: 9.86 X10*3/UL (ref 1.6–5.5)
NEUTROPHILS NFR BLD AUTO: 62.9 %
NRBC BLD-RTO: 0 /100 WBCS (ref 0–0)
P OFFSET: 167 MS
P OFFSET: 173 MS
P ONSET: 145 MS
P ONSET: 147 MS
PLATELET # BLD AUTO: 377 X10*3/UL (ref 150–450)
POTASSIUM SERPL-SCNC: 3.4 MMOL/L (ref 3.5–5.3)
PR INTERVAL: 158 MS
PROT SERPL-MCNC: 6.9 G/DL (ref 6.4–8.2)
PROTHROMBIN TIME: 11.5 SECONDS (ref 9.8–12.8)
Q ONSET: 224 MS
Q ONSET: 226 MS
QRS COUNT: 13 BEATS
QRS COUNT: 13 BEATS
QRS DURATION: 68 MS
QRS DURATION: 74 MS
QT INTERVAL: 408 MS
QT INTERVAL: 416 MS
QTC CALCULATION(BAZETT): 467 MS
QTC CALCULATION(BAZETT): 468 MS
QTC FREDERICIA: 447 MS
QTC FREDERICIA: 449 MS
R AXIS: -13 DEGREES
R AXIS: 9 DEGREES
RBC # BLD AUTO: 4.75 X10*6/UL (ref 4–5.2)
SARS-COV-2 RNA RESP QL NAA+PROBE: NOT DETECTED
SODIUM SERPL-SCNC: 137 MMOL/L (ref 136–145)
T AXIS: 28 DEGREES
T AXIS: 40 DEGREES
T OFFSET: 428 MS
T OFFSET: 434 MS
VENTRICULAR RATE: 76 BPM
VENTRICULAR RATE: 79 BPM
WBC # BLD AUTO: 15.7 X10*3/UL (ref 4.4–11.3)

## 2024-02-07 PROCEDURE — 96374 THER/PROPH/DIAG INJ IV PUSH: CPT

## 2024-02-07 PROCEDURE — 93005 ELECTROCARDIOGRAM TRACING: CPT

## 2024-02-07 PROCEDURE — 2500000004 HC RX 250 GENERAL PHARMACY W/ HCPCS (ALT 636 FOR OP/ED): Performed by: EMERGENCY MEDICINE

## 2024-02-07 PROCEDURE — 84484 ASSAY OF TROPONIN QUANT: CPT | Performed by: EMERGENCY MEDICINE

## 2024-02-07 PROCEDURE — 85379 FIBRIN DEGRADATION QUANT: CPT | Performed by: EMERGENCY MEDICINE

## 2024-02-07 PROCEDURE — 83880 ASSAY OF NATRIURETIC PEPTIDE: CPT | Performed by: EMERGENCY MEDICINE

## 2024-02-07 PROCEDURE — 83735 ASSAY OF MAGNESIUM: CPT | Performed by: EMERGENCY MEDICINE

## 2024-02-07 PROCEDURE — 80053 COMPREHEN METABOLIC PANEL: CPT | Performed by: EMERGENCY MEDICINE

## 2024-02-07 PROCEDURE — 71045 X-RAY EXAM CHEST 1 VIEW: CPT

## 2024-02-07 PROCEDURE — 96361 HYDRATE IV INFUSION ADD-ON: CPT

## 2024-02-07 PROCEDURE — 85730 THROMBOPLASTIN TIME PARTIAL: CPT | Performed by: EMERGENCY MEDICINE

## 2024-02-07 PROCEDURE — 36415 COLL VENOUS BLD VENIPUNCTURE: CPT | Performed by: EMERGENCY MEDICINE

## 2024-02-07 PROCEDURE — 96375 TX/PRO/DX INJ NEW DRUG ADDON: CPT

## 2024-02-07 PROCEDURE — 83690 ASSAY OF LIPASE: CPT | Performed by: EMERGENCY MEDICINE

## 2024-02-07 PROCEDURE — 85025 COMPLETE CBC W/AUTO DIFF WBC: CPT | Performed by: EMERGENCY MEDICINE

## 2024-02-07 PROCEDURE — 99285 EMERGENCY DEPT VISIT HI MDM: CPT | Mod: 25 | Performed by: EMERGENCY MEDICINE

## 2024-02-07 PROCEDURE — 2550000001 HC RX 255 CONTRASTS: Performed by: EMERGENCY MEDICINE

## 2024-02-07 PROCEDURE — 71275 CT ANGIOGRAPHY CHEST: CPT | Performed by: STUDENT IN AN ORGANIZED HEALTH CARE EDUCATION/TRAINING PROGRAM

## 2024-02-07 PROCEDURE — 85610 PROTHROMBIN TIME: CPT | Performed by: EMERGENCY MEDICINE

## 2024-02-07 PROCEDURE — 71045 X-RAY EXAM CHEST 1 VIEW: CPT | Performed by: RADIOLOGY

## 2024-02-07 PROCEDURE — 71275 CT ANGIOGRAPHY CHEST: CPT

## 2024-02-07 PROCEDURE — 2500000005 HC RX 250 GENERAL PHARMACY W/O HCPCS: Performed by: EMERGENCY MEDICINE

## 2024-02-07 PROCEDURE — 87636 SARSCOV2 & INF A&B AMP PRB: CPT | Performed by: EMERGENCY MEDICINE

## 2024-02-07 RX ORDER — ONDANSETRON HYDROCHLORIDE 2 MG/ML
4 INJECTION, SOLUTION INTRAVENOUS ONCE
Status: COMPLETED | OUTPATIENT
Start: 2024-02-07 | End: 2024-02-07

## 2024-02-07 RX ORDER — FAMOTIDINE 10 MG/ML
20 INJECTION INTRAVENOUS ONCE
Status: COMPLETED | OUTPATIENT
Start: 2024-02-07 | End: 2024-02-07

## 2024-02-07 RX ORDER — POTASSIUM CHLORIDE 20 MEQ/1
20 TABLET, EXTENDED RELEASE ORAL ONCE
Status: COMPLETED | OUTPATIENT
Start: 2024-02-07 | End: 2024-02-07

## 2024-02-07 RX ORDER — ONDANSETRON 4 MG/1
4 TABLET, ORALLY DISINTEGRATING ORAL EVERY 8 HOURS PRN
Qty: 9 TABLET | Refills: 0 | Status: SHIPPED | OUTPATIENT
Start: 2024-02-07 | End: 2024-02-10

## 2024-02-07 RX ADMIN — IOHEXOL 75 ML: 350 INJECTION, SOLUTION INTRAVENOUS at 17:35

## 2024-02-07 RX ADMIN — SODIUM CHLORIDE 500 ML: 9 INJECTION, SOLUTION INTRAVENOUS at 15:35

## 2024-02-07 RX ADMIN — POTASSIUM CHLORIDE 20 MEQ: 1500 TABLET, EXTENDED RELEASE ORAL at 19:28

## 2024-02-07 RX ADMIN — ONDANSETRON 4 MG: 2 INJECTION INTRAMUSCULAR; INTRAVENOUS at 15:35

## 2024-02-07 RX ADMIN — Medication: at 15:00

## 2024-02-07 RX ADMIN — FAMOTIDINE 20 MG: 10 INJECTION, SOLUTION INTRAVENOUS at 15:36

## 2024-02-07 ASSESSMENT — HEART SCORE
HEART SCORE: 4
HISTORY: SLIGHTLY SUSPICIOUS
RISK FACTORS: >2 RISK FACTORS OR HX OF ATHEROSCLEROTIC DISEASE
TROPONIN: LESS THAN OR EQUAL TO NORMAL LIMIT
AGE: 65+
ECG: NORMAL

## 2024-02-07 ASSESSMENT — LIFESTYLE VARIABLES
EVER FELT BAD OR GUILTY ABOUT YOUR DRINKING: NO
HAVE YOU EVER FELT YOU SHOULD CUT DOWN ON YOUR DRINKING: NO
HAVE PEOPLE ANNOYED YOU BY CRITICIZING YOUR DRINKING: NO
EVER HAD A DRINK FIRST THING IN THE MORNING TO STEADY YOUR NERVES TO GET RID OF A HANGOVER: NO

## 2024-02-07 ASSESSMENT — COLUMBIA-SUICIDE SEVERITY RATING SCALE - C-SSRS
2. HAVE YOU ACTUALLY HAD ANY THOUGHTS OF KILLING YOURSELF?: NO
1. IN THE PAST MONTH, HAVE YOU WISHED YOU WERE DEAD OR WISHED YOU COULD GO TO SLEEP AND NOT WAKE UP?: NO
6. HAVE YOU EVER DONE ANYTHING, STARTED TO DO ANYTHING, OR PREPARED TO DO ANYTHING TO END YOUR LIFE?: NO

## 2024-02-07 ASSESSMENT — PAIN SCALES - GENERAL
PAINLEVEL_OUTOF10: 0 - NO PAIN
PAINLEVEL_OUTOF10: 0 - NO PAIN

## 2024-02-07 ASSESSMENT — PAIN - FUNCTIONAL ASSESSMENT: PAIN_FUNCTIONAL_ASSESSMENT: 0-10

## 2024-02-07 NOTE — PROGRESS NOTES
Discharge Facility:  [facility]    Discharge Diagnosis:  -COPD exacerbation (CMS/HCC) with hypoxia  -Hypertension  -Steroid-induced hyperglycemia  -Memory impairment  -Grief/depression  -Insomnia    Admission Date:2/2/24   Discharge Date: 2/6/24    PCP Appointment Date:TBD-I am unable to make an appt due to no openings . Message sent to office staff requesting assistance. As well as encourged patient & on voicemail with Son to call today to schedule.     Specialist Appointment Date:   TBD  Dr. Weber -Pulmology   [address] [place] [id number] [phone]      2/8/2024 9:30 AM   [facility]   CARDIOLOGY NEW PATIENT   [id number] ([place])   Manfred Fitzpatrick MD       Hospital Encounter and Summary: Linked  See discharge assessment below for further details  I introduced myself and the TCM program to Rae Lundy. I gave my contact information for any further questions.  Engagement  Call Start Time: 1016 (Spoke with Rae. Having memory issues per hospital notes) (2/7/2024 10:16 AM)    Medications  Medications reviewed with patient/caregiver?: Yes (went over medications with patient but she is not sure because her son is helping with medications) (2/7/2024 10:16 AM)  Is the patient having any side effects they believe may be caused by any medication additions or changes?: No (2/7/2024 10:16 AM)  Does the patient have all medications ordered at discharge?: Yes (per pt- Son picked them up on way home last night) (2/7/2024 10:16 AM)  Prescription Comments: STOP :atorvastatin 40 mg tablet; Commonly known as: Lipitor to see if memory improves START taking these medications:    - diclofenac sodium 1 % gel; Commonly known as: Voltaren; Apply 4.5 inches   (4 g) topically 4 times a day as needed (knee pain).  - guaiFENesin 600 mg 12 hr tablet; Commonly known as: Mucinex; Take 1   tablet (600 mg) by mouth 2 times a day. Do not crush, chew, or split.  -mirtazapine 15 mg tablet; Commonly known as:  Remeron; Take 1 tablet (15   mg) by mouth once daily at bedtime. TO HELP WITH SLEEP AND DEPRESSION-CAN START WITH 1/2 TABLET FOR A WEEK IF MAKES YOU TOO SLEEPY THE NEXT DAY  - polyethylene glycol 17 gram packet; Commonly known as: Glycolax,   Miralax; Take 17 g by mouth once daily. FOR CONSTIPATION  -predniSONE 20 mg tablet; Commonly known as: Deltasone; Take 1 tablet (20 mg) by mouth once daily for 7 day (2/7/2024 10:16 AM)  Is the patient taking all medications as directed (includes completed medication regime)?: Yes (2/7/2024 10:16 AM)  Medication Comments: Noted in Hospital stay that provider called son to review at discharge (2/7/2024 10:16 AM)    Appointments  Does the patient have a primary care provider?: Yes (Dr Diaz. He is out of office for a few weeks for a personal matter. I sent message to office staff to call son to get hospital follow up scheduled with one of his partners.) (2/7/2024 10:16 AM)  Care Management Interventions: Educated patient on importance of making appointment (2/7/2024 10:16 AM)  Has the patient kept scheduled appointments due by today?: Yes (2/7/2024 10:16 AM)  Care Management Interventions: Educated on importance of keeping appointment (reviewed tomorrow Cardio appt with pt. She was not happy that she was having to go to a new doc she had never heard of before. I told her that Dr Fitzpatrick has a very good reputation and she was very pleased to hear that.) (2/7/2024 10:16 AM)    Self Management  Has home health visited the patient within 72 hours of discharge?: Not applicable (2/7/2024 10:16 AM)  What Durable Medical Equipment (DME) was ordered?: o oxygen gas therapy; Commonly known as: O2; Inhale 5 L/min once every 24   hours. (2/7/2024 10:16 AM)  Has all Durable Medical Equipment (DME) been delivered?: Yes (per discharge notes they were helping Son get in touch with DME company. Neil has a tank she brought home from hospital.) (2/7/2024 10:16 AM)  DME Interventions: (S) Other  (Comment) (pt mentioned that O2 tank is large to take to doc appt. I mentioned that maybe they could get smaller tanks for travel. I will also suggest to Eleazar (Son) when/if he returns my call.) (2/7/2024 10:16 AM)  Care Management Interventions: Notified family (left message for Son- Eleazar) (2/7/2024 10:16 AM)    Patient Teaching  Does the patient have access to their discharge instructions?: -- (She belives Son may have them) (2/7/2024 10:16 AM)  Care Management Interventions: Reviewed instructions with patient (2/7/2024 10:16 AM)  What is the patient's perception of their health status since discharge?: Same (pt reported she feels okay but just trying to get used to the O2 tank and tubes. She said her legs are weak from not walking in the hospital but her goal is to get them stronger.) (2/7/2024 10:16 AM)  Is the patient/caregiver able to teach back the hierarchy of who to call/visit for symptoms/problems? PCP, Specialist, Home Health nurse, Urgent Care, ED, 911: Yes (2/7/2024 10:16 AM)    Wrap Up  Wrap Up Additional Comments: Left message for son to return my call and also told pt to have him give me a call to review discharge and any questions he may have. (2/7/2024 10:16 AM)  Call End Time: 1045 (2/7/2024 10:16 AM)

## 2024-02-07 NOTE — ED PROVIDER NOTES
79-year-old female presents emergency department with complaints of chest pain, shortness of breath, and nausea.  Patient reports she was recently admitted to the hospital and was discharged yesterday.  She says the symptoms began suddenly when she was taking a breathing treatment.  She denies any fevers.  She does report a cough that she states has been going on for weeks.  She describes her chest pain as a pressure-like discomfort.  She admits to nausea but denies any vomiting.  She reports some abdominal cramping, but denies current abdominal pain.  No dysuria, diarrhea, constipation, black or bloody stools. Patient does report that she is on nasal cannula oxygen since discharging from the hospital.  Chart review shows significant past medical history for Villeda's esophagus, hypertension, CAD COPD, dementia, hyperlipidemia, hyperlipidemia, paroxysmal atrial fibrillation, TIA, hypoxia, COPD, and constipation.  She is a previous smoker.  No illicit drug use or regular alcohol use.  Chart review shows patient was admitted from 2/2/2024 until 2/6/2024.  She was admitted for COPD exacerbation with hypoxia and steroid-induced hyperglycemia.  Patient is not on any anticoagulants.      History provided by:  Patient   used: No           ------------------------------------------------------------------------------------------------------------------------------------------    VS: As documented in the triage note and EMR flowsheet from this visit were reviewed.    Review of Systems  Constitutional: no fever, chills reports malaise  Eyes: no redness, discharge, pain  HENT: no sore throat, nose bleeds, congestion, rhinorrhea   Cardiovascular: Admits to chest discomfort no leg edema, palpitations  Respiratory: Reports shortness of breath and cough  GI: Admits to nausea no diarrhea, pain, vomiting, constipation, BRBPR, melena  : no dysuria, frequency, hematuria  Musculoskeletal: no neck pain, stiffness,   no joint deformity, swelling  Skin: no rash, erythema, wounds  Neurological: no headache, dizziness, lightheadedness, weakness, numbness, or tingling  Psychiatric: no suicidal thoughts, confusion, agitation  Metabolic: no polyuria or polydipsia  Hematologic: no increased bleeding or bruising  Immunology: No immunocompromise state    [facility]  Nursing notes reviewed and confirmed by me.  Chart review performed including medications, allergies, and medical, surgical, and family history  Visit Vitals  /72 (BP Location: Right arm, Patient Position: Lying)   Pulse 85   Temp 36.9 °C (98.4 °F) (Temporal)   Resp 18     Physical Exam  Vitals and nursing note reviewed.   Constitutional:       General: She is not in acute distress.     Appearance: Normal appearance. She is not ill-appearing.   HENT:      Head: Normocephalic and atraumatic.      Right Ear: External ear normal.      Left Ear: External ear normal.      Nose: Nose normal. No congestion or rhinorrhea.      Mouth/Throat:      Mouth: Mucous membranes are moist.      Pharynx: No oropharyngeal exudate or posterior oropharyngeal erythema.   Eyes:      Extraocular Movements: Extraocular movements intact.      Conjunctiva/sclera: Conjunctivae normal.      Pupils: Pupils are equal, round, and reactive to light.   Cardiovascular:      Rate and Rhythm: Normal rate and regular rhythm.      Pulses: Normal pulses.      Heart sounds: Normal heart sounds.   Pulmonary:      Effort: Pulmonary effort is normal. No respiratory distress.      Breath sounds: No stridor. Rhonchi present. No wheezing or rales.      Comments: Coarse breath sounds with rhonchi bilaterally.  Abdominal:      General: There is no distension.      Palpations: Abdomen is soft.      Tenderness: There is no abdominal tenderness. There is no guarding or rebound.   Musculoskeletal:         General: No swelling or deformity. Normal range of motion.      Cervical back: Normal range of motion and neck supple. No  rigidity.      Right lower leg: No edema.      Left lower leg: No edema.      Comments: No calf tenderness    Skin:     General: Skin is warm and dry.      Capillary Refill: Capillary refill takes less than 2 seconds.      Coloration: Skin is not jaundiced.      Findings: No rash.   Neurological:      General: No focal deficit present.      Mental Status: She is alert and oriented to person, place, and time.      Sensory: No sensory deficit.      Motor: No weakness.   Psychiatric:         Mood and Affect: Mood normal.         Behavior: Behavior normal.        Past Medical History:   Diagnosis Date    Acute candidiasis of vulva and vagina 10/22/2018    Yeast infection of the vagina    Anxiety disorder, unspecified     Anxiety and depression    Body mass index (BMI) 29.0-29.9, adult 03/04/2022    BMI 29.0-29.9,adult    Bunion of left foot     Bunion, left    Candidiasis of skin and nail 10/22/2018    Yeast infection of the skin    COVID-19 04/25/2022    COVID-19 virus infection    Gastro-esophageal reflux disease without esophagitis     Chronic GERD    Nontoxic multinodular goiter     Multinodular goiter    Overweight 03/04/2022    Overweight    Personal history of other diseases of the digestive system 01/17/2019    History of gastroesophageal reflux (GERD)    Personal history of other diseases of the musculoskeletal system and connective tissue     History of osteopenia    Personal history of other diseases of the nervous system and sense organs     History of hearing loss    Personal history of other endocrine, nutritional and metabolic disease     History of hyperlipidemia    Personal history of other infectious and parasitic diseases     History of herpes zoster    Pneumonia, unspecified organism     Community acquired pneumonia    Spondylosis without myelopathy or radiculopathy, cervical region     Osteoarthritis of cervical spine      Past Surgical History:   Procedure Laterality Date    BLADDER SURGERY       BREAST LUMPECTOMY  09/28/2017    Left Breast Lumpectomy    CHOLECYSTECTOMY  09/28/2017    Cholecystectomy    CT HEAD ANGIO W AND WO IV CONTRAST  04/10/2019    CT HEAD ANGIO W AND WO IV CONTRAST 4/10/2019 [facility]    KNEE ARTHROSCOPY W/ DEBRIDEMENT  09/28/2017    Arthroscopy Knee Right    MR HEAD ANGIO WO IV CONTRAST  09/05/2019    MR HEAD ANGIO WO IV CONTRAST 9/5/2019 [facility]    MR HEAD ANGIO WO IV CONTRAST  01/19/2020    MR HEAD ANGIO WO IV CONTRAST 1/19/2020 [facility]    MR NECK ANGIO WO IV CONTRAST  01/19/2020    MR NECK ANGIO WO IV CONTRAST 1/19/2020 [facility]    OTHER SURGICAL HISTORY  09/28/2017    Surgery Foot Amputation Metatarsal And Toe    OTHER SURGICAL HISTORY  09/07/2022    Loop recorder insertion    OTHER SURGICAL HISTORY  11/14/2021    Thyroid surgery    OTHER SURGICAL HISTORY  11/14/2021    Complete colonoscopy    OTHER SURGICAL HISTORY  11/14/2021    Knee surgery      Social History     Socioeconomic History    Marital status:      Spouse name: Not on file    Number of children: Not on file    Years of education: Not on file    Highest education level: Not on file   Occupational History    Not on file   Tobacco Use    Smoking status: Former     Types: Cigarettes    Smokeless tobacco: Former   Vaping Use    Vaping Use: Never used   Substance and Sexual Activity    Alcohol use: Never    Drug use: Never    Sexual activity: Not on file   Other Topics Concern    Not on file   Social History Narrative    Not on file     Social Determinants of Health     Financial Resource Strain: Low Risk  (2/2/2024)    Overall Financial Resource Strain (CARDIA)     Difficulty of Paying Living Expenses: Not hard at all   Food Insecurity: Not on file   Transportation Needs: No Transportation Needs (2/2/2024)    PRAPARE - Transportation     Lack of Transportation (Medical): No     Lack of Transportation (Non-Medical): No   Physical Activity: Not on file   Stress: Not on  file   Social Connections: Not on file   Intimate Partner Violence: Not on file   Housing Stability: Low Risk  (2/2/2024)    Housing Stability Vital Sign     Unable to Pay for Housing in the Last Year: No     Number of Places Lived in the Last Year: 1     Unstable Housing in the Last Year: No      ------------------------------------------------------------------------------------------------------------------------------------------  CT angio chest for pulmonary embolism   Final Result   1. No pulmonary emboli are identified in the lungs bilaterally,   although evaluation of the smaller subsegmental vessels is somewhat   limited by motion, especially in the lower lobes.   2. Interval increase in atelectatic changes in the lower lobes   bilaterally, compared to prior exam in July of 2023, without evidence   of consolidation or pleural effusion.   3. Centrilobular emphysematous changes with upper lung predominance.   4. Moderate coronary artery calcifications.   5. Small hiatal hernia.             MACRO:   None        Signed by: Alessandra Diaz 2/7/2024 6:07 PM   Dictation workstation:   [id number]      XR chest 1 view   Final Result   1.  Bibasilar platelike atelectasis versus linear scars.        Signed by: Bang Lai 2/7/2024 3:54 PM   Dictation workstation:   [id number]         Labs Reviewed   CBC WITH AUTO DIFFERENTIAL - Abnormal       Result Value    WBC 15.7 (*)     nRBC 0.0      RBC 4.75      Hemoglobin 13.8      Hematocrit 41.4      MCV 87      MCH 29.1      MCHC 33.3      RDW 14.5      Platelets 377      Neutrophils % 62.9      Immature Granulocytes %, Automated 0.9      Lymphocytes % 25.4      Monocytes % 10.0      Eosinophils % 0.7      Basophils % 0.1      Neutrophils Absolute 9.86 (*)     Immature Granulocytes Absolute, Automated 0.14      Lymphocytes Absolute 3.99 (*)     Monocytes Absolute 1.57 (*)     Eosinophils Absolute 0.11      Basophils Absolute 0.02     COMPREHENSIVE METABOLIC PANEL  - Abnormal    Glucose 109 (*)     Sodium 137      Potassium 3.4 (*)     Chloride 97 (*)     Bicarbonate 29      Anion Gap 14      Urea Nitrogen 26 (*)     Creatinine 0.82      eGFR 73      Calcium 9.0      Albumin 4.0      Alkaline Phosphatase 89      Total Protein 6.9      AST 21      Bilirubin, Total 0.5      ALT 24     APTT - Abnormal    aPTT 25 (*)     Narrative:     The APTT is no longer used for monitoring Unfractionated Heparin Therapy. For monitoring Heparin Therapy, use the Heparin Assay.   D-DIMER, VTE EXCLUSION - Abnormal    D-Dimer, Quantitative VTE Exclusion 715 (*)     Narrative:     The VTE Exclusion D-Dimer assay is reported in ng/mL Fibrinogen Equivalent Units (FEU).    Per 's instructions for use, a value of less than 500 ng/mL (FEU) may help to exclude DVT or PE in outpatients when the assay is used with a clinical pretest probability assessment.(AEMR must utilize and document eCalc 'Wells Score Deep Vein Thrombosis Risk' for DVT exclusion only. Emergency Department should utilize  Guidelines for Emergency Department Use of the VTE Exclusion D-Dimer and Clinical Pretest probability assessment model for DVT or PE exclusion.)   MAGNESIUM - Normal    Magnesium 1.93     SARS-COV-2 AND INFLUENZA A/B PCR - Normal    Flu A Result Not Detected      Flu B Result Not Detected      Coronavirus 2019, PCR Not Detected      Narrative:     This assay has received FDA Emergency Use Authorization (EUA) and  is only authorized for the duration of time that circumstances exist to justify the authorization of the emergency use of in vitro diagnostic tests for the detection of SARS-CoV-2 virus and/or diagnosis of COVID-19 infection under section 564(b)(1) of the Act, 21 U.S.C. 360bbb-3(b)(1). Testing for SARS-CoV-2 is only recommended for patients who meet current clinical and/or epidemiological criteria as defined by federal, state, or local public health directives. This assay is an in vitro diagnostic  nucleic acid amplification test for the qualitative detection of SARS-CoV-2, Influenza A, and Influenza B from nasopharyngeal specimens and has been validated for use at [facility]. Negative results do not preclude COVID-19 infections or Influenza A/B infections, and should not be used as the sole basis for diagnosis, treatment, or other management decisions. If Influenza A/B and RSV PCR results are negative, testing for Parainfluenza virus, Adenovirus and Metapneumovirus is routinely performed for [facility] and intensive care inpatients, and is available on other patients by placing an add-on request.    PROTIME-INR - Normal    Protime 11.5      INR 1.0     B-TYPE NATRIURETIC PEPTIDE - Normal    BNP 31      Narrative:        <100 pg/mL - Heart failure unlikely  100-299 pg/mL - Intermediate probability of acute heart                  failure exacerbation. Correlate with clinical                  context and patient history.    >=300 pg/mL - Heart Failure likely. Correlate with clinical                  context and patient history.    BNP testing is performed using different testing methodology at [facility] than at [facility]. Direct result comparisons should only be made within the same method.      LIPASE - Normal    Lipase 74      Narrative:     Venipuncture immediately after or during the administration of Metamizole may lead to falsely low results. Testing should be performed immediately prior to Metamizole dosing.   SERIAL TROPONIN-INITIAL - Normal    Troponin I, High Sensitivity 5      Narrative:     Less than 99th percentile of normal range cutoff-  Female and children under 18 years old <14 ng/L; Male <21 ng/L: Negative  Repeat testing should be performed if clinically indicated.     Female and children under 18 years old 14-50 ng/L; Male 21-50 ng/L:  Consistent with possible cardiac damage and possible increased clinical   risk. Serial  measurements may help to assess extent of myocardial damage.     >50 ng/L: Consistent with cardiac damage, increased clinical risk and  myocardial infarction. Serial measurements may help assess extent of   myocardial damage.      NOTE: Children less than 1 year old may have higher baseline troponin   levels and results should be interpreted in conjunction with the overall   clinical context.     NOTE: Troponin I testing is performed using a different   testing methodology at [facility] than at other   [facility]. Direct result comparisons should only   be made within the same method.   SERIAL TROPONIN, 1 HOUR - Normal    Troponin I, High Sensitivity 5      Narrative:     Less than 99th percentile of normal range cutoff-  Female and children under 18 years old <14 ng/L; Male <21 ng/L: Negative  Repeat testing should be performed if clinically indicated.     Female and children under 18 years old 14-50 ng/L; Male 21-50 ng/L:  Consistent with possible cardiac damage and possible increased clinical   risk. Serial measurements may help to assess extent of myocardial damage.     >50 ng/L: Consistent with cardiac damage, increased clinical risk and  myocardial infarction. Serial measurements may help assess extent of   myocardial damage.      NOTE: Children less than 1 year old may have higher baseline troponin   levels and results should be interpreted in conjunction with the overall   clinical context.     NOTE: Troponin I testing is performed using a different   testing methodology at [facility] than at other   [facility]. Direct result comparisons should only   be made within the same method.   TROPONIN SERIES- (INITIAL, 1 HR)    Narrative:     The following orders were created for panel order Troponin I Series, High Sensitivity (0, 1 HR).  Procedure                               Abnormality         Status                     ---------                               -----------          ------                     Troponin I, High Sensiti...[[id number]]  Normal              Final result               Troponin, High Sensitivi...[[id number]]  Normal              Final result                 Please view results for these tests on the individual orders.        Medical Decision Making  EKG interpreted by ED physician: Accelerated junctional rhythm rate of 76.  QRS and QTc are within normal range.  No significant ST elevations or depressions.  No significant Q waves.  Good R wave progression.  Normal axis.    Repeat EKG interpreted by ED physician: Normal sinus rhythm rate of 79.  ID, QRS, QTc intervals within normal limits.  No significant ST elevations or depressions.  No significant Q waves.  Poor R wave progression.  Normal axis.    79-year-old female presents emergency department with complaints of chest pain, shortness of breath, and nausea.  Patient reports his symptoms began suddenly when she was about to take a breathing treatment at home.  She states she recently got discharged from the hospital and chart review shows this was for a COPD exacerbation.  Given the patient's presenting complaints a thorough workup was obtained.  COVID and flu testing are negative.  Cardiac enzymes x 2 and EKG showed no acute ACS.  CMP shows mild hypokalemia repleted orally no significant metabolic abnormalities otherwise.  CBC shows nonspecific leukocytosis that I suspect is due to recent steroid use.  Patient does not have findings concerning for sepsis.  BNP is within normal range I do not suspect heart failure.  Lipase within normal range I do not suspect pancreatitis.  D-dimer was found to be elevated and CT PE study subsequently obtained does not show pulmonary embolus.  Or other acute cardiopulmonary process.  Chest x-ray is also negative for any pneumonia, pleural effusion, or pulmonary edema.  Patient was treated symptomatically with IV fluids, Pepcid, and Zofran.  On reevaluation she reports that her  symptoms have all resolved.  She also described to me an episode earlier today feeling lightheaded and flushed like she may pass out.  Subsequent orthostatic vital signs were negative.  Patient states her symptoms have overall resolved.  I discussed with both patient and son at bedside possible admission given her heart score of 4, cardiac risk factors, and symptoms versus close outpatient follow-up.  Patient and family are comfortable with outpatient follow-up and do not wish to be admitted observation at this time.  Patient does have follow-up with her cardiologist tomorrow.  She is also advised to follow-up with her primary care doctor.  We discussed reasons to return to the ED.       Diagnoses as of 02/07/24 2000   Chest pain, unspecified type   Shortness of breath   Nausea      1. Chest pain, unspecified type        2. Shortness of breath        3. Nausea  ondansetron ODT (Zofran-ODT) 4 mg disintegrating tablet         Procedures     This note was dictated using dragon software and may contain errors related to dictation interpretation errors.      Jeremiah Wong,   02/07/24 2000

## 2024-02-07 NOTE — PROGRESS NOTES
Son Eleazar returned my voicemail with a voicemail.   Confirmed that O2 was delivered , All meds were picked up last night and he is handling them for her.   Handling follow ups as well.

## 2024-02-08 ENCOUNTER — OFFICE VISIT (OUTPATIENT)
Dept: CARDIOLOGY | Facility: CLINIC | Age: 79
End: 2024-02-08
Payer: MEDICARE

## 2024-02-08 VITALS
HEART RATE: 106 BPM | SYSTOLIC BLOOD PRESSURE: 140 MMHG | HEIGHT: 62 IN | BODY MASS INDEX: 32.22 KG/M2 | DIASTOLIC BLOOD PRESSURE: 68 MMHG | WEIGHT: 175.1 LBS

## 2024-02-08 DIAGNOSIS — Z95.818 STATUS POST PLACEMENT OF IMPLANTABLE LOOP RECORDER: ICD-10-CM

## 2024-02-08 DIAGNOSIS — Z01.810 ENCOUNTER FOR PRE-OPERATIVE CARDIOVASCULAR CLEARANCE: ICD-10-CM

## 2024-02-08 DIAGNOSIS — J44.9 CHRONIC OBSTRUCTIVE PULMONARY DISEASE, UNSPECIFIED COPD TYPE (MULTI): ICD-10-CM

## 2024-02-08 DIAGNOSIS — Z98.61 CAD S/P PERCUTANEOUS CORONARY ANGIOPLASTY: ICD-10-CM

## 2024-02-08 DIAGNOSIS — I49.5 TACHYCARDIA-BRADYCARDIA SYNDROME (MULTI): ICD-10-CM

## 2024-02-08 DIAGNOSIS — I48.0 PAROXYSMAL ATRIAL FIBRILLATION (MULTI): ICD-10-CM

## 2024-02-08 DIAGNOSIS — I10 BENIGN ESSENTIAL HYPERTENSION: ICD-10-CM

## 2024-02-08 DIAGNOSIS — E78.2 MIXED HYPERLIPIDEMIA: ICD-10-CM

## 2024-02-08 DIAGNOSIS — I25.10 CAD S/P PERCUTANEOUS CORONARY ANGIOPLASTY: ICD-10-CM

## 2024-02-08 DIAGNOSIS — Z87.891 FORMER SMOKER: ICD-10-CM

## 2024-02-08 PROCEDURE — 99215 OFFICE O/P EST HI 40 MIN: CPT | Performed by: INTERNAL MEDICINE

## 2024-02-08 PROCEDURE — 3078F DIAST BP <80 MM HG: CPT | Performed by: INTERNAL MEDICINE

## 2024-02-08 PROCEDURE — 3077F SYST BP >= 140 MM HG: CPT | Performed by: INTERNAL MEDICINE

## 2024-02-08 PROCEDURE — 1157F ADVNC CARE PLAN IN RCRD: CPT | Performed by: INTERNAL MEDICINE

## 2024-02-08 PROCEDURE — 1111F DSCHRG MED/CURRENT MED MERGE: CPT | Performed by: INTERNAL MEDICINE

## 2024-02-08 PROCEDURE — 1126F AMNT PAIN NOTED NONE PRSNT: CPT | Performed by: INTERNAL MEDICINE

## 2024-02-08 PROCEDURE — 1159F MED LIST DOCD IN RCRD: CPT | Performed by: INTERNAL MEDICINE

## 2024-02-08 PROCEDURE — 1036F TOBACCO NON-USER: CPT | Performed by: INTERNAL MEDICINE

## 2024-02-08 NOTE — PATIENT INSTRUCTIONS
Patient to plan heart cath with Dr. Manfred Fitzpatrick MD [facility] in near future.   This is to evaluate your heart arteries prior to surgery.   Will address clearance based on this prior to your knee surgery with Dr. Bertha MD     Please take all of your normal morning medications with a sip of water, including your Aspirin the day of the procedure.     No other changes today.   Continue same medications and treatments.   Patient educated on proper medication use.   Patient educated on risk factor modification.   Please bring any lab results from other providers / physicians to your next appointment.     Please bring all medicines, vitamins, and herbal supplements with you when you come to the office.     Prescriptions will not be filled unless you are compliant with your follow up appointments or have a follow up appointment scheduled as per instruction of your physician. Refills should be requested at the time of your visit.    Jabari BUENO RN am scribing for and in the presence of Dr. Manfred Fitzpatrick MD [facility]

## 2024-02-08 NOTE — DISCHARGE INSTRUCTIONS
Follow up with your primary care doctor and cardiology. Return to the ER if symptoms worsen or change. We discussed possible admission. You have chosen to follow up closely outpatient instead of being admitted. If you change your mind at any time you can return to the ER for further evaluation.

## 2024-02-08 NOTE — PROGRESS NOTES
Referred by Dr. Rosales ref. provider found provider found for   Chief Complaint   Patient presents with    Pre-op Clearance    New Patient Visit      New patient here for cardiac clearance 03-22-24 TOTAL R KNEE DR. BARRERA        History of Present Illness  Rae Lundy is a 79 y.o. year old female patient with history of severe COPD she was admitted to the hospital with significant shortness of breath and exacerbation of COPD she is discharged on home O2.  She will be scheduled for knee surgery however the patient has significant coronary disease.  She is known to have occluded right coronary artery with collaterals and had significant disease of LAD which showed the vessel just she is living.  She underwent PTCA with stenting of the LAD about 4 years ago.  She is unable to do a stress test because of significant COPD and most likely will be abnormal due to her known coronary disease.  We do need to do clearance for her surgery.  Her EKG showed sinus rhythm with poor R wave progression.  I had a very lengthy discussion with the patient today that we do need to identify her coronary anatomy prior to her surgery because of a very high risk coronary anatomy in the past.  Will go ahead with coronary angiography to evaluate her risk and based on that further recommendation will be made    Past Medical History  Past Medical History:   Diagnosis Date    Acute candidiasis of vulva and vagina 10/22/2018    Yeast infection of the vagina    Anxiety disorder, unspecified     Anxiety and depression    Body mass index (BMI) 29.0-29.9, adult 03/04/2022    BMI 29.0-29.9,adult    Bunion of left foot     Bunion, left    Candidiasis of skin and nail 10/22/2018    Yeast infection of the skin    COVID-19 04/25/2022    COVID-19 virus infection    Gastro-esophageal reflux disease without esophagitis     Chronic GERD    Nontoxic multinodular goiter     Multinodular goiter    Overweight 03/04/2022    Overweight    Personal history of other  diseases of the digestive system 01/17/2019    History of gastroesophageal reflux (GERD)    Personal history of other diseases of the musculoskeletal system and connective tissue     History of osteopenia    Personal history of other diseases of the nervous system and sense organs     History of hearing loss    Personal history of other endocrine, nutritional and metabolic disease     History of hyperlipidemia    Personal history of other infectious and parasitic diseases     History of herpes zoster    Pneumonia, unspecified organism     Community acquired pneumonia    Spondylosis without myelopathy or radiculopathy, cervical region     Osteoarthritis of cervical spine       Social History  Social History     Tobacco Use    Smoking status: Former     Types: Cigarettes    Smokeless tobacco: Former   Vaping Use    Vaping Use: Never used   Substance Use Topics    Alcohol use: Never    Drug use: Never       Family History     Family History   Problem Relation Name Age of Onset    Arthritis Mother      Heart failure Mother      Esophageal cancer Mother      Rheumatic fever Mother      Other (heart problem) Mother      Esophageal cancer Father      Other (cardiac disorder) Son         Review of Systems  As per HPI, all other systems reviewed and negative.    Allergies:  Allergies   Allergen Reactions    Prednisone Psychosis        Outpatient Medications:  Current Outpatient Medications   Medication Instructions    acetaminophen (Tylenol 8 HOUR) 650 mg ER tablet 2 tablets, oral, Daily    alendronate (FOSAMAX) 70 mg, oral, Weekly, Monday      aspirin 81 mg EC tablet 1 tablet, oral, Daily    diclofenac sodium (VOLTAREN) 4 g, Topical, 4 times daily PRN    ergocalciferol (VITAMIN D-2) 1.25 mg, oral, Weekly, Monday     guaiFENesin (MUCINEX) 600 mg, oral, 2 times daily, Do not crush, chew, or split.    hydroCHLOROthiazide (Microzide) 12.5 mg capsule 1 capsule, oral, Daily    ibuprofen 600 mg, oral, Every 6 hours PRN     ibuprofen 400 mg, oral, Every 6 hours PRN    ipratropium-albuteroL (Duo-Neb) 0.5-2.5 mg/3 mL nebulizer solution 3 mL, nebulization, 2 times daily    isosorbide mononitrate ER (Imdur) 30 mg 24 hr tablet 1 tablet, oral, Daily    magnesium oxide (Mag-Ox) 400 mg (241.3 mg magnesium) tablet 1 tablet, oral, Daily    metoprolol tartrate (LOPRESSOR) 25 mg, oral, 2 times daily    mirtazapine (REMERON) 15 mg, oral, Nightly    multivit-min/ferrous fumarate (MULTI VITAMIN ORAL) 1 tablet, oral, Daily    nitroglycerin (NITROSTAT) 0.4 mg, sublingual, Every 5 min PRN    omeprazole (PRILOSEC) 20 mg, oral, Daily before breakfast, Do not crush or chew.    ondansetron ODT (ZOFRAN-ODT) 4 mg, oral, Every 8 hours PRN    oxygen (O2) 5 L/min, inhalation, Every 24 hours    polyethylene glycol (GLYCOLAX, MIRALAX) 17 g, oral, Daily    potassium chloride CR 10 mEq ER tablet 1 tablet, oral, Daily    predniSONE (DELTASONE) 20 mg, oral, Daily         Vitals:  Vitals:    02/08/24 0957   BP: 140/68   Pulse: 106       Physical Exam:  Physical Exam  Vitals and nursing note reviewed.   Constitutional:       Appearance: Normal appearance. She is normal weight.      Comments: Wheelchair with assistance due to knee pain.    HENT:      Head: Normocephalic and atraumatic.   Eyes:      Extraocular Movements: Extraocular movements intact.      Pupils: Pupils are equal, round, and reactive to light.   Cardiovascular:      Rate and Rhythm: Normal rate and regular rhythm.      Pulses: Normal pulses.   Pulmonary:      Effort: Pulmonary effort is normal.      Breath sounds: Normal breath sounds.      Comments: Supplemental oxygen dependent.   Musculoskeletal:      Cervical back: Normal range of motion.      Right lower leg: No edema.      Left lower leg: No edema.   Skin:     General: Skin is warm and dry.   Neurological:      General: No focal deficit present.      Mental Status: She is alert and oriented to person, place, and time.             Assessment/Plan    Diagnoses and all orders for this visit:  Paroxysmal atrial fibrillation (CMS/[facility])  CAD S/P percutaneous coronary angioplasty  Benign essential hypertension  Mixed hyperlipidemia  Status post placement of implantable loop recorder  Tachycardia-bradycardia syndrome (CMS/[facility])  Chronic obstructive pulmonary disease, unspecified COPD type (CMS/[facility])  BMI 32.0-32.9,adult  Former smoker  Encounter for pre-operative cardiovascular clearance          Manfred Fitzpatrick MD [facility]  Interventional Cardiology   of [facility]     Thank you for allowing me to participate in the care of this patient. Please do not hesitate to contact me with any further questions or concerns.

## 2024-02-12 ENCOUNTER — OFFICE VISIT (OUTPATIENT)
Dept: PRIMARY CARE | Facility: CLINIC | Age: 79
End: 2024-02-12
Payer: MEDICARE

## 2024-02-12 VITALS
OXYGEN SATURATION: 100 % | SYSTOLIC BLOOD PRESSURE: 150 MMHG | TEMPERATURE: 98 F | WEIGHT: 175.4 LBS | HEART RATE: 74 BPM | HEIGHT: 62 IN | DIASTOLIC BLOOD PRESSURE: 84 MMHG | BODY MASS INDEX: 32.28 KG/M2

## 2024-02-12 DIAGNOSIS — M17.11 PRIMARY OSTEOARTHRITIS OF RIGHT KNEE: ICD-10-CM

## 2024-02-12 DIAGNOSIS — J01.10 ACUTE NON-RECURRENT FRONTAL SINUSITIS: ICD-10-CM

## 2024-02-12 DIAGNOSIS — Z09 HOSPITAL DISCHARGE FOLLOW-UP: Primary | ICD-10-CM

## 2024-02-12 PROCEDURE — 1126F AMNT PAIN NOTED NONE PRSNT: CPT | Performed by: STUDENT IN AN ORGANIZED HEALTH CARE EDUCATION/TRAINING PROGRAM

## 2024-02-12 PROCEDURE — 1157F ADVNC CARE PLAN IN RCRD: CPT | Performed by: STUDENT IN AN ORGANIZED HEALTH CARE EDUCATION/TRAINING PROGRAM

## 2024-02-12 PROCEDURE — 3077F SYST BP >= 140 MM HG: CPT | Performed by: STUDENT IN AN ORGANIZED HEALTH CARE EDUCATION/TRAINING PROGRAM

## 2024-02-12 PROCEDURE — 99214 OFFICE O/P EST MOD 30 MIN: CPT | Performed by: STUDENT IN AN ORGANIZED HEALTH CARE EDUCATION/TRAINING PROGRAM

## 2024-02-12 PROCEDURE — 1159F MED LIST DOCD IN RCRD: CPT | Performed by: STUDENT IN AN ORGANIZED HEALTH CARE EDUCATION/TRAINING PROGRAM

## 2024-02-12 PROCEDURE — 1111F DSCHRG MED/CURRENT MED MERGE: CPT | Performed by: STUDENT IN AN ORGANIZED HEALTH CARE EDUCATION/TRAINING PROGRAM

## 2024-02-12 PROCEDURE — 1036F TOBACCO NON-USER: CPT | Performed by: STUDENT IN AN ORGANIZED HEALTH CARE EDUCATION/TRAINING PROGRAM

## 2024-02-12 PROCEDURE — 3079F DIAST BP 80-89 MM HG: CPT | Performed by: STUDENT IN AN ORGANIZED HEALTH CARE EDUCATION/TRAINING PROGRAM

## 2024-02-12 PROCEDURE — 1160F RVW MEDS BY RX/DR IN RCRD: CPT | Performed by: STUDENT IN AN ORGANIZED HEALTH CARE EDUCATION/TRAINING PROGRAM

## 2024-02-12 RX ORDER — AMOXICILLIN AND CLAVULANATE POTASSIUM 875; 125 MG/1; MG/1
875 TABLET, FILM COATED ORAL 2 TIMES DAILY
Qty: 14 TABLET | Refills: 0 | Status: SHIPPED | OUTPATIENT
Start: 2024-02-12 | End: 2024-02-23 | Stop reason: HOSPADM

## 2024-02-12 RX ORDER — L. ACIDOPH/L. PLANTAR/L. RHAMN 1B CELL
1 CAPSULE,DELAYED RELEASE (ENTERIC COATED) ORAL DAILY
Qty: 30 CAPSULE | Refills: 1 | Status: ON HOLD | OUTPATIENT
Start: 2024-02-12 | End: 2024-02-23 | Stop reason: ALTCHOICE

## 2024-02-12 ASSESSMENT — ENCOUNTER SYMPTOMS
DEPRESSION: 1
LOSS OF SENSATION IN FEET: 0
OCCASIONAL FEELINGS OF UNSTEADINESS: 1

## 2024-02-12 ASSESSMENT — PATIENT HEALTH QUESTIONNAIRE - PHQ9
SUM OF ALL RESPONSES TO PHQ9 QUESTIONS 1 AND 2: 2
2. FEELING DOWN, DEPRESSED OR HOPELESS: SEVERAL DAYS
1. LITTLE INTEREST OR PLEASURE IN DOING THINGS: SEVERAL DAYS
10. IF YOU CHECKED OFF ANY PROBLEMS, HOW DIFFICULT HAVE THESE PROBLEMS MADE IT FOR YOU TO DO YOUR WORK, TAKE CARE OF THINGS AT HOME, OR GET ALONG WITH OTHER PEOPLE: NOT DIFFICULT AT ALL

## 2024-02-12 NOTE — PROGRESS NOTES
"Subjective   Patient ID: Rae Lundy is a 79 y.o. female who presents for Hospital Follow-up (Patient is in office today for a hospital follow-up discharge- 2/6/2024  EM. ).    Wants knee replacement  Right knee is still bothering her  Was discharged from hospital COPD exacerbation  Is having cardiac cath next week  Cough has improved over the past couple  Feeling well, doing breathing treatments 3 times a day and it seems to be effective  Denies cp, chest pressure, sob  Has been having off and on productive cough and sinusitis, seems better today    Plan  Regarding improving cough and sinus congestion, will send wait and see augmentin  Follow up with cardiology as directed  Cardiology will address HTN  Once cleared by cardiology, will hopefully proceed with right total knee    HPI     Review of Systems    Objective   /84 (BP Location: Left arm, Patient Position: Sitting, BP Cuff Size: Large adult)   Pulse 74   Temp 36.7 °C (98 °F) (Temporal)   Ht 1.575 m (5' 2\")   Wt 79.6 kg (175 lb 6.4 oz)   SpO2 100% Comment: RA  BMI 32.08 kg/m²     Physical Exam  Vitals reviewed.   Constitutional:       General: She is not in acute distress.     Appearance: Normal appearance. She is not ill-appearing or toxic-appearing.   HENT:      Head: Atraumatic.      Mouth/Throat:      Mouth: Mucous membranes are moist.      Pharynx: Oropharynx is clear.   Eyes:      Extraocular Movements: Extraocular movements intact.      Conjunctiva/sclera: Conjunctivae normal.      Pupils: Pupils are equal, round, and reactive to light.   Cardiovascular:      Rate and Rhythm: Normal rate and regular rhythm.      Pulses: Normal pulses.      Heart sounds: Normal heart sounds. No murmur heard.  Pulmonary:      Effort: Pulmonary effort is normal. No respiratory distress.      Breath sounds: Normal breath sounds.   Abdominal:      General: Abdomen is flat.      Palpations: Abdomen is soft.      Tenderness: There is no abdominal tenderness. "   Musculoskeletal:      Right lower leg: No edema.      Left lower leg: No edema.   Skin:     General: Skin is warm and dry.      Capillary Refill: Capillary refill takes less than 2 seconds.      Findings: No rash.   Neurological:      General: No focal deficit present.      Mental Status: She is alert.      Cranial Nerves: No cranial nerve deficit.      Gait: Gait normal.   Psychiatric:         Mood and Affect: Mood normal.         Behavior: Behavior normal.         Assessment/Plan   Problem List Items Addressed This Visit             ICD-10-CM    Right knee DJD M17.11    Relevant Orders    Referral to Orthopaedic Surgery     Other Visit Diagnoses         Codes    Hospital discharge follow-up    -  Primary Z09    Acute non-recurrent frontal sinusitis     J01.10    Relevant Medications    amoxicillin-pot clavulanate (Augmentin) 875-125 mg tablet    L.acidophilus-Bifido.longum (Probiotic Pearls Acidophilus) 1 billion cell capsule,delayed release(DR/EC)

## 2024-02-19 ENCOUNTER — APPOINTMENT (OUTPATIENT)
Dept: ORTHOPEDIC SURGERY | Facility: CLINIC | Age: 79
End: 2024-02-19
Payer: MEDICARE

## 2024-02-19 RX ORDER — ASPIRIN 325 MG
325 TABLET ORAL ONCE
Status: CANCELLED | OUTPATIENT
Start: 2024-02-19 | End: 2024-02-19

## 2024-02-21 ENCOUNTER — APPOINTMENT (OUTPATIENT)
Dept: ORTHOPEDIC SURGERY | Facility: CLINIC | Age: 79
End: 2024-02-21
Payer: MEDICARE

## 2024-02-22 ENCOUNTER — APPOINTMENT (OUTPATIENT)
Dept: ORTHOPEDIC SURGERY | Facility: CLINIC | Age: 79
End: 2024-02-22
Payer: MEDICARE

## 2024-02-23 ENCOUNTER — HOSPITAL ENCOUNTER (OUTPATIENT)
Facility: HOSPITAL | Age: 79
Setting detail: OUTPATIENT SURGERY
Discharge: HOME | End: 2024-02-23
Attending: INTERNAL MEDICINE | Admitting: INTERNAL MEDICINE
Payer: MEDICARE

## 2024-02-23 ENCOUNTER — APPOINTMENT (OUTPATIENT)
Dept: CARDIOLOGY | Facility: HOSPITAL | Age: 79
End: 2024-02-23
Payer: MEDICARE

## 2024-02-23 VITALS
OXYGEN SATURATION: 98 % | HEART RATE: 77 BPM | SYSTOLIC BLOOD PRESSURE: 120 MMHG | RESPIRATION RATE: 18 BRPM | HEIGHT: 64 IN | DIASTOLIC BLOOD PRESSURE: 68 MMHG | WEIGHT: 181.44 LBS | BODY MASS INDEX: 30.98 KG/M2 | TEMPERATURE: 98.2 F

## 2024-02-23 DIAGNOSIS — I10 BENIGN ESSENTIAL HYPERTENSION: ICD-10-CM

## 2024-02-23 DIAGNOSIS — Z01.810 ENCOUNTER FOR PRE-OPERATIVE CARDIOVASCULAR CLEARANCE: ICD-10-CM

## 2024-02-23 DIAGNOSIS — Z98.61 CAD S/P PERCUTANEOUS CORONARY ANGIOPLASTY: Primary | ICD-10-CM

## 2024-02-23 DIAGNOSIS — I25.10 CAD S/P PERCUTANEOUS CORONARY ANGIOPLASTY: Primary | ICD-10-CM

## 2024-02-23 DIAGNOSIS — I20.9 ANGINA PECTORIS, UNSPECIFIED (CMS-HCC): ICD-10-CM

## 2024-02-23 LAB
ANION GAP SERPL CALC-SCNC: 13 MMOL/L (ref 10–20)
APTT PPP: 32 SECONDS (ref 27–38)
BUN SERPL-MCNC: 9 MG/DL (ref 6–23)
CALCIUM SERPL-MCNC: 9.1 MG/DL (ref 8.6–10.3)
CHLORIDE SERPL-SCNC: 101 MMOL/L (ref 98–107)
CO2 SERPL-SCNC: 28 MMOL/L (ref 21–32)
CREAT SERPL-MCNC: 0.63 MG/DL (ref 0.5–1.05)
EGFRCR SERPLBLD CKD-EPI 2021: 90 ML/MIN/1.73M*2
ERYTHROCYTE [DISTWIDTH] IN BLOOD BY AUTOMATED COUNT: 15.1 % (ref 11.5–14.5)
GLUCOSE SERPL-MCNC: 121 MG/DL (ref 74–99)
HCT VFR BLD AUTO: 34.9 % (ref 36–46)
HGB BLD-MCNC: 11.1 G/DL (ref 12–16)
INR PPP: 1 (ref 0.9–1.1)
MCH RBC QN AUTO: 28 PG (ref 26–34)
MCHC RBC AUTO-ENTMCNC: 31.8 G/DL (ref 32–36)
MCV RBC AUTO: 88 FL (ref 80–100)
NRBC BLD-RTO: 0 /100 WBCS (ref 0–0)
PLATELET # BLD AUTO: 277 X10*3/UL (ref 150–450)
POTASSIUM SERPL-SCNC: 4 MMOL/L (ref 3.5–5.3)
PROTHROMBIN TIME: 10.9 SECONDS (ref 9.8–12.8)
RBC # BLD AUTO: 3.96 X10*6/UL (ref 4–5.2)
SODIUM SERPL-SCNC: 138 MMOL/L (ref 136–145)
WBC # BLD AUTO: 5.8 X10*3/UL (ref 4.4–11.3)

## 2024-02-23 PROCEDURE — 85730 THROMBOPLASTIN TIME PARTIAL: CPT | Mod: 91 | Performed by: NURSE PRACTITIONER

## 2024-02-23 PROCEDURE — 93458 L HRT ARTERY/VENTRICLE ANGIO: CPT | Performed by: INTERNAL MEDICINE

## 2024-02-23 PROCEDURE — 96373 THER/PROPH/DIAG INJ IA: CPT | Performed by: INTERNAL MEDICINE

## 2024-02-23 PROCEDURE — 7100000010 HC PHASE TWO TIME - EACH INCREMENTAL 1 MINUTE: Performed by: INTERNAL MEDICINE

## 2024-02-23 PROCEDURE — 99152 MOD SED SAME PHYS/QHP 5/>YRS: CPT | Performed by: INTERNAL MEDICINE

## 2024-02-23 PROCEDURE — C1887 CATHETER, GUIDING: HCPCS | Performed by: INTERNAL MEDICINE

## 2024-02-23 PROCEDURE — 93005 ELECTROCARDIOGRAM TRACING: CPT | Mod: 59

## 2024-02-23 PROCEDURE — 2500000001 HC RX 250 WO HCPCS SELF ADMINISTERED DRUGS (ALT 637 FOR MEDICARE OP): Performed by: INTERNAL MEDICINE

## 2024-02-23 PROCEDURE — 2500000005 HC RX 250 GENERAL PHARMACY W/O HCPCS: Performed by: INTERNAL MEDICINE

## 2024-02-23 PROCEDURE — 36415 COLL VENOUS BLD VENIPUNCTURE: CPT | Performed by: NURSE PRACTITIONER

## 2024-02-23 PROCEDURE — 80048 BASIC METABOLIC PNL TOTAL CA: CPT | Performed by: NURSE PRACTITIONER

## 2024-02-23 PROCEDURE — C1769 GUIDE WIRE: HCPCS | Performed by: INTERNAL MEDICINE

## 2024-02-23 PROCEDURE — 85027 COMPLETE CBC AUTOMATED: CPT | Performed by: NURSE PRACTITIONER

## 2024-02-23 PROCEDURE — 2550000001 HC RX 255 CONTRASTS: Performed by: INTERNAL MEDICINE

## 2024-02-23 PROCEDURE — 2500000004 HC RX 250 GENERAL PHARMACY W/ HCPCS (ALT 636 FOR OP/ED): Mod: JZ | Performed by: INTERNAL MEDICINE

## 2024-02-23 PROCEDURE — 2720000007 HC OR 272 NO HCPCS: Performed by: INTERNAL MEDICINE

## 2024-02-23 PROCEDURE — C1894 INTRO/SHEATH, NON-LASER: HCPCS | Performed by: INTERNAL MEDICINE

## 2024-02-23 PROCEDURE — 7100000009 HC PHASE TWO TIME - INITIAL BASE CHARGE: Performed by: INTERNAL MEDICINE

## 2024-02-23 PROCEDURE — 2500000004 HC RX 250 GENERAL PHARMACY W/ HCPCS (ALT 636 FOR OP/ED): Performed by: NURSE PRACTITIONER

## 2024-02-23 RX ORDER — NITROGLYCERIN 40 MG/100ML
INJECTION INTRAVENOUS AS NEEDED
Status: DISCONTINUED | OUTPATIENT
Start: 2024-02-23 | End: 2024-02-23 | Stop reason: HOSPADM

## 2024-02-23 RX ORDER — SODIUM CHLORIDE 9 MG/ML
100 INJECTION, SOLUTION INTRAVENOUS CONTINUOUS
Status: DISCONTINUED | OUTPATIENT
Start: 2024-02-23 | End: 2024-02-23

## 2024-02-23 RX ORDER — NAPROXEN SODIUM 220 MG/1
81 TABLET, FILM COATED ORAL ONCE
Status: COMPLETED | OUTPATIENT
Start: 2024-02-23 | End: 2024-02-23

## 2024-02-23 RX ORDER — SODIUM CHLORIDE 9 MG/ML
100 INJECTION, SOLUTION INTRAVENOUS CONTINUOUS
Status: DISCONTINUED | OUTPATIENT
Start: 2024-02-23 | End: 2024-02-23 | Stop reason: HOSPADM

## 2024-02-23 RX ORDER — MIDAZOLAM HYDROCHLORIDE 1 MG/ML
INJECTION, SOLUTION INTRAMUSCULAR; INTRAVENOUS AS NEEDED
Status: DISCONTINUED | OUTPATIENT
Start: 2024-02-23 | End: 2024-02-23 | Stop reason: HOSPADM

## 2024-02-23 RX ORDER — HEPARIN SODIUM 1000 [USP'U]/ML
INJECTION, SOLUTION INTRAVENOUS; SUBCUTANEOUS AS NEEDED
Status: DISCONTINUED | OUTPATIENT
Start: 2024-02-23 | End: 2024-02-23 | Stop reason: HOSPADM

## 2024-02-23 RX ORDER — FENTANYL CITRATE 50 UG/ML
INJECTION, SOLUTION INTRAMUSCULAR; INTRAVENOUS AS NEEDED
Status: DISCONTINUED | OUTPATIENT
Start: 2024-02-23 | End: 2024-02-23 | Stop reason: HOSPADM

## 2024-02-23 RX ORDER — LIDOCAINE HYDROCHLORIDE 20 MG/ML
INJECTION, SOLUTION INFILTRATION; PERINEURAL AS NEEDED
Status: DISCONTINUED | OUTPATIENT
Start: 2024-02-23 | End: 2024-02-23 | Stop reason: HOSPADM

## 2024-02-23 RX ADMIN — ASPIRIN 81 MG: 81 TABLET, CHEWABLE ORAL at 07:38

## 2024-02-23 RX ADMIN — SODIUM CHLORIDE 100 ML/HR: 9 INJECTION, SOLUTION INTRAVENOUS at 07:38

## 2024-02-23 ASSESSMENT — PAIN SCALES - GENERAL: PAINLEVEL_OUTOF10: 0 - NO PAIN

## 2024-02-23 ASSESSMENT — COLUMBIA-SUICIDE SEVERITY RATING SCALE - C-SSRS
2. HAVE YOU ACTUALLY HAD ANY THOUGHTS OF KILLING YOURSELF?: NO
6. HAVE YOU EVER DONE ANYTHING, STARTED TO DO ANYTHING, OR PREPARED TO DO ANYTHING TO END YOUR LIFE?: NO
1. IN THE PAST MONTH, HAVE YOU WISHED YOU WERE DEAD OR WISHED YOU COULD GO TO SLEEP AND NOT WAKE UP?: NO

## 2024-02-23 ASSESSMENT — PAIN - FUNCTIONAL ASSESSMENT: PAIN_FUNCTIONAL_ASSESSMENT: 0-10

## 2024-02-23 NOTE — NURSING NOTE
"Discharge instructions given via \"teach back\" method. Covered radial site care and restrictions, medications/when to resume them, and follow up appointments. Both Pt and son state understanding and answer follow up questions correctly. Right radial site soft and stable with no hematoma or oozing. Pt ambulating ad cole with no complaints of dizziness/lightheadedness/pain. Pt ready to discharge home via wheelchair.  "

## 2024-02-23 NOTE — PROGRESS NOTES
Reviewed report of cardiac catheterization and recommendation for continued medical management of CAD, postprocedure activity restrictions, and need for continued general cardiology follow-up with Dr. Fitzpatrick and EP follow-up with Dr. Matute with patient and her son who is at the bedside.  Pictures of coronary arteries were reviewed and provided to them.  They verbalized understanding of information.

## 2024-02-23 NOTE — SIGNIFICANT EVENT
Upon arrival to room focused assessment performed and WDL. Right radial site soft and stable with no hematoma or oozing. Vascband in place. Educated Pt on current restrictions d/t radial arterial puncture, she states understanding. Placed Pt on 2L O2 via NC. Son at bedside. Pt given water, coffee, and turkey sandwich box. She denies further needs at this time.

## 2024-02-23 NOTE — SIGNIFICANT EVENT
Vascband removed from right radial site per physician order. DSD applied. Soft and stable with no hematoma or oozing.

## 2024-02-23 NOTE — NURSING NOTE
Pt accidentally urinated in bed. Pt ambulated to side of the bed with nurses assistance and used bedside commode. Bed changed and Pt cleaned up with warm wipes. Pt denies dizziness/lightheadedness/pain. Right radial site soft and stable with no hematoma or oozing. Pt denies further needs at this time.

## 2024-02-23 NOTE — POST-PROCEDURE NOTE
Physician Transition of Care Summary  Invasive Cardiovascular Lab    Procedure Date: 2/23/2024  Attending:    * Manfred Fitzpatrick - Primary  Resident/Fellow/Other Assistant: Surgeon(s) and Role:    Indications:   Pre-op Diagnosis     * CAD S/P percutaneous coronary angioplasty [I25.10, Z98.61]     * Benign essential hypertension [I10]     * Encounter for pre-operative cardiovascular clearance [Z01.810]    Post-procedure diagnosis:   Post-op Diagnosis     * CAD S/P percutaneous coronary angioplasty [I25.10, Z98.61]     * Benign essential hypertension [I10]     * Encounter for pre-operative cardiovascular clearance [Z01.810]    Procedure(s):     * Left Heart Cath, With LV  AK CATH PLACE/CORON ANGIO, IMG SUPER/INTERP,W LEFT HEART VENTRICULOGRAPHY [[id number]]    Procedure Findings:   Patent previous LAD stent, mild disease of circumflex, complete occlusion of mid right coronary artery with collaterals, normal left ventricular function    Description of the Procedure:   Left heart catheterization coronary angiography left ventriculogram    Complications:   None    Stents/Implants:       Anticoagulation/Antiplatelet Plan:   Intravenous heparin    Estimated Blood Loss:   5 mL    Anesthesia: Moderate Sedation Anesthesia Staff: No anesthesia staff entered.    Any Specimen(s) Removed:   Order Name Source Comment Collection Info Order Time   COAGULATION SCREEN Blood, Venous  Collected By: Chelsie Mccann RN 2/23/2024  7:13 AM     Release result to MyChart   Immediate        BASIC METABOLIC PANEL Blood, Venous  Collected By: Chelsie Mccann RN 2/23/2024  7:13 AM     Release result to MyChart   Immediate        CBC Blood, Venous  Collected By: Chelsie Mccann RN 2/23/2024  7:13 AM     Release result to MyChart   Immediate            Disposition:   Medical therapy      Electronically signed by: Manfred Fitzpatrick MD, 2/23/2024 9:23 AM

## 2024-02-27 LAB
ATRIAL RATE: 82 BPM
P AXIS: 37 DEGREES
P OFFSET: 193 MS
P ONSET: 152 MS
PR INTERVAL: 154 MS
Q ONSET: 229 MS
QRS COUNT: 13 BEATS
QRS DURATION: 68 MS
QT INTERVAL: 386 MS
QTC CALCULATION(BAZETT): 450 MS
QTC FREDERICIA: 428 MS
R AXIS: -2 DEGREES
T AXIS: 16 DEGREES
T OFFSET: 422 MS
VENTRICULAR RATE: 82 BPM

## 2024-02-28 ENCOUNTER — TRANSCRIBE ORDERS (OUTPATIENT)
Dept: ORTHOPEDIC SURGERY | Facility: CLINIC | Age: 79
End: 2024-02-28
Payer: MEDICARE

## 2024-02-28 DIAGNOSIS — M17.11 UNILATERAL PRIMARY OSTEOARTHRITIS, RIGHT KNEE: Primary | ICD-10-CM

## 2024-03-01 ENCOUNTER — HOSPITAL ENCOUNTER (OUTPATIENT)
Facility: HOSPITAL | Age: 79
Setting detail: OUTPATIENT SURGERY
End: 2024-03-01
Attending: INTERNAL MEDICINE | Admitting: INTERNAL MEDICINE
Payer: MEDICARE

## 2024-03-01 ENCOUNTER — OFFICE VISIT (OUTPATIENT)
Dept: CARDIOLOGY | Facility: CLINIC | Age: 79
End: 2024-03-01
Payer: MEDICARE

## 2024-03-01 VITALS
BODY MASS INDEX: 30.05 KG/M2 | SYSTOLIC BLOOD PRESSURE: 126 MMHG | DIASTOLIC BLOOD PRESSURE: 72 MMHG | HEART RATE: 102 BPM | HEIGHT: 64 IN | WEIGHT: 176 LBS

## 2024-03-01 DIAGNOSIS — Z98.61 CAD S/P PERCUTANEOUS CORONARY ANGIOPLASTY: ICD-10-CM

## 2024-03-01 DIAGNOSIS — Z87.891 FORMER SMOKER: ICD-10-CM

## 2024-03-01 DIAGNOSIS — Z95.818 STATUS POST PLACEMENT OF IMPLANTABLE LOOP RECORDER: ICD-10-CM

## 2024-03-01 DIAGNOSIS — I49.5 TACHYCARDIA-BRADYCARDIA SYNDROME (MULTI): ICD-10-CM

## 2024-03-01 DIAGNOSIS — I10 BENIGN ESSENTIAL HYPERTENSION: ICD-10-CM

## 2024-03-01 DIAGNOSIS — Z71.89 ENCOUNTER FOR MEDICATION REVIEW AND COUNSELING: ICD-10-CM

## 2024-03-01 DIAGNOSIS — I25.10 CAD S/P PERCUTANEOUS CORONARY ANGIOPLASTY: ICD-10-CM

## 2024-03-01 DIAGNOSIS — J44.9 CHRONIC OBSTRUCTIVE PULMONARY DISEASE, UNSPECIFIED COPD TYPE (MULTI): ICD-10-CM

## 2024-03-01 DIAGNOSIS — Z45.09 ENCOUNTER FOR LOOP RECORDER AT END OF BATTERY LIFE: ICD-10-CM

## 2024-03-01 DIAGNOSIS — Z71.89 ENCOUNTER TO DISCUSS TREATMENT OPTIONS: ICD-10-CM

## 2024-03-01 DIAGNOSIS — I48.0 PAROXYSMAL ATRIAL FIBRILLATION (MULTI): Primary | ICD-10-CM

## 2024-03-01 DIAGNOSIS — G45.9 TIA (TRANSIENT ISCHEMIC ATTACK): ICD-10-CM

## 2024-03-01 DIAGNOSIS — F03.90 DEMENTIA, UNSPECIFIED DEMENTIA SEVERITY, UNSPECIFIED DEMENTIA TYPE, UNSPECIFIED WHETHER BEHAVIORAL, PSYCHOTIC, OR MOOD DISTURBANCE OR ANXIETY (MULTI): ICD-10-CM

## 2024-03-01 DIAGNOSIS — E78.2 MIXED HYPERLIPIDEMIA: ICD-10-CM

## 2024-03-01 PROBLEM — R07.9 CHEST PAIN: Status: ACTIVE | Noted: 2019-02-23

## 2024-03-01 PROBLEM — D50.9 IDA (IRON DEFICIENCY ANEMIA): Status: ACTIVE | Noted: 2024-03-01

## 2024-03-01 PROBLEM — R55 NEAR SYNCOPE: Status: ACTIVE | Noted: 2024-03-01

## 2024-03-01 PROCEDURE — 1111F DSCHRG MED/CURRENT MED MERGE: CPT | Performed by: INTERNAL MEDICINE

## 2024-03-01 PROCEDURE — 93000 ELECTROCARDIOGRAM COMPLETE: CPT | Performed by: INTERNAL MEDICINE

## 2024-03-01 PROCEDURE — 1126F AMNT PAIN NOTED NONE PRSNT: CPT | Performed by: INTERNAL MEDICINE

## 2024-03-01 PROCEDURE — 3078F DIAST BP <80 MM HG: CPT | Performed by: INTERNAL MEDICINE

## 2024-03-01 PROCEDURE — 99215 OFFICE O/P EST HI 40 MIN: CPT | Performed by: INTERNAL MEDICINE

## 2024-03-01 PROCEDURE — 1036F TOBACCO NON-USER: CPT | Performed by: INTERNAL MEDICINE

## 2024-03-01 PROCEDURE — 1159F MED LIST DOCD IN RCRD: CPT | Performed by: INTERNAL MEDICINE

## 2024-03-01 PROCEDURE — 1157F ADVNC CARE PLAN IN RCRD: CPT | Performed by: INTERNAL MEDICINE

## 2024-03-01 PROCEDURE — 3074F SYST BP LT 130 MM HG: CPT | Performed by: INTERNAL MEDICINE

## 2024-03-01 PROCEDURE — 1160F RVW MEDS BY RX/DR IN RCRD: CPT | Performed by: INTERNAL MEDICINE

## 2024-03-01 RX ORDER — MUPIROCIN 20 MG/G
1 OINTMENT TOPICAL ONCE
Status: CANCELLED | OUTPATIENT
Start: 2024-03-01 | End: 2024-03-01

## 2024-03-01 RX ORDER — CHLORHEXIDINE GLUCONATE 40 MG/ML
SOLUTION TOPICAL ONCE
Status: CANCELLED | OUTPATIENT
Start: 2024-03-01 | End: 2024-03-01

## 2024-03-01 RX ORDER — SODIUM CHLORIDE 9 MG/ML
100 INJECTION, SOLUTION INTRAVENOUS CONTINUOUS
Status: CANCELLED | OUTPATIENT
Start: 2024-03-01

## 2024-03-01 NOTE — PROGRESS NOTES
"Chief Complaint:   Hypertension     History Of Present Illness:    Rae Lundy is a 79 y.o. female presenting with followup.    She is accompanied by her son.    She is awaiting right knee replacement by Dr. Stone in the near future.    She did have cardiac catheterization last week.  Cath results reviewed.    She has not had a loop recorder check for \"a long time.\"  She reports that everything has been chaos at home.  She and her son are agreeable to device check today.  Shared decision making performed.  We did discuss that the device is at replacement then loop recorder explant recommended.  All questions answered.  E consent obtained         Last Recorded Vitals:  Vitals:    03/01/24 1047   BP: 126/72   BP Location: Left arm   Patient Position: Sitting   Pulse: 102   Weight: 79.8 kg (176 lb)   Height: 1.626 m (5' 4\")       Past Medical History:  See List     Past Surgical History:  See List       Social History:  She reports that she quit smoking about 24 years ago. Her smoking use included cigarettes. She smoked an average of 1 pack per day. She has quit using smokeless tobacco. She reports that she does not drink alcohol and does not use drugs.    Family History:  Family History   Problem Relation Name Age of Onset    Arthritis Mother      Heart failure Mother      Esophageal cancer Mother      Rheumatic fever Mother      Other (heart problem) Mother      Esophageal cancer Father      Other (cardiac disorder) Son          Allergies:  Prednisone    Outpatient Medications:  Current Outpatient Medications   Medication Instructions    acetaminophen (Tylenol 8 HOUR) 650 mg ER tablet 2 tablets, oral, Daily    alendronate (FOSAMAX) 70 mg, oral, Weekly, Monday      aspirin 81 mg EC tablet 1 tablet, oral, Daily    ergocalciferol (VITAMIN D-2) 1.25 mg, oral, Weekly, Monday     guaiFENesin (MUCINEX) 600 mg, oral, 2 times daily, Do not crush, chew, or split.    hydroCHLOROthiazide (Microzide) 12.5 mg capsule 1 " capsule, oral, Daily    ibuprofen 600 mg, oral, Every 6 hours PRN    ibuprofen 400 mg, oral, Every 6 hours PRN    ipratropium-albuteroL (Duo-Neb) 0.5-2.5 mg/3 mL nebulizer solution 3 mL, nebulization, Every other day    isosorbide mononitrate ER (Imdur) 30 mg 24 hr tablet 1 tablet, oral, Daily    magnesium oxide (Mag-Ox) 400 mg (241.3 mg magnesium) tablet 1 tablet, oral, Daily    metoprolol tartrate (LOPRESSOR) 25 mg, oral, 2 times daily    mirtazapine (REMERON) 15 mg, oral, Nightly    multivit-min/ferrous fumarate (MULTI VITAMIN ORAL) 1 tablet, oral, Daily    nitroglycerin (NITROSTAT) 0.4 mg, sublingual, Every 5 min PRN    omeprazole (PRILOSEC) 20 mg, oral, Daily before breakfast, Do not crush or chew.    oxygen (O2) 5 L/min, inhalation, Every 24 hours    polyethylene glycol (GLYCOLAX, MIRALAX) 17 g, oral, Daily    potassium chloride CR 10 mEq ER tablet 1 tablet, oral, Daily   Review of Systems   All other systems reviewed and are negative.    Physical Exam:  Constitutional:       General: Awake.      Appearance: Normal and healthy appearance. Well-developed and not in distress.   Neck:      Vascular: No JVR. JVD normal.   Pulmonary:      Effort: Pulmonary effort is normal.      Breath sounds: Normal breath sounds. No wheezing. No rhonchi. No rales.   Chest:      Chest wall: Not tender to palpatation.   Cardiovascular:      PMI at left midclavicular line. Normal rate. Regular rhythm. Normal S1. Normal S2.       Murmurs: There is no murmur.      No gallop.  No click. No rub.   Pulses:     Intact distal pulses.   Edema:     Peripheral edema absent.   Abdominal:      Tenderness: There is no abdominal tenderness.   Musculoskeletal: Normal range of motion.         General: No tenderness. Skin:     General: Skin is warm and dry.   Neurological:      General: No focal deficit present.      Mental Status: Alert and oriented to person, place and time.            Last Labs:  CBC -  Lab Results   Component Value Date    WBC  5.8 02/23/2024    HGB 11.1 (L) 02/23/2024    HCT 34.9 (L) 02/23/2024    MCV 88 02/23/2024     02/23/2024       CMP -  Lab Results   Component Value Date    CALCIUM 9.1 02/23/2024    PROT 6.9 02/07/2024    ALBUMIN 4.0 02/07/2024    AST 21 02/07/2024    ALT 24 02/07/2024    ALKPHOS 89 02/07/2024    BILITOT 0.5 02/07/2024       LIPID PANEL -   Lab Results   Component Value Date    CHOL 143 11/04/2022    TRIG 97 11/04/2022    HDL 56.0 11/04/2022    CHHDL 2.6 11/04/2022    LDLF 68 11/04/2022    VLDL 19 11/04/2022    NHDL 120 09/06/2019       RENAL FUNCTION PANEL -   Lab Results   Component Value Date    GLUCOSE 121 (H) 02/23/2024     02/23/2024    K 4.0 02/23/2024     02/23/2024    CO2 28 02/23/2024    ANIONGAP 13 02/23/2024    BUN 9 02/23/2024    CREATININE 0.63 02/23/2024    CALCIUM 9.1 02/23/2024    ALBUMIN 4.0 02/07/2024        Lab Results   Component Value Date    BNP 31 02/07/2024    HGBA1C 6.5 (H) 02/03/2024       Last Cardiology Tests:  ECG:  ECG 12 lead  02/23/2024    ECG: Today.  Sinus tachycardia. Normal axis. Corrected QT interval 460 ms.    Device check September 2022               Cath:  Cardiac catheterization - coronary 02/23/2024    Lab review: I have personally reviewed the laboratory result(s) see above    Assessment/Plan   Diagnoses and all orders for this visit:  CAD S/P percutaneous coronary angioplasty  Chronic obstructive pulmonary disease, unspecified COPD type (CMS/HCC)  Mixed hyperlipidemia  Paroxysmal atrial fibrillation (CMS/HCC)  Tachycardia-bradycardia syndrome (CMS/HCC)  Dementia, unspecified dementia severity, unspecified dementia type, unspecified whether behavioral, psychotic, or mood disturbance or anxiety (CMS/HCC)  TIA (transient ischemic attack)  Benign essential hypertension  Former smoker  BMI 30.0-30.9,adult  Encounter for medication review and counseling  Encounter to discuss treatment options        Paroxysmal atrial fibrillation with episodes of rapid  ventricular rate.  Chronic. Stable. Not able to be anticoagulated due to prior GI bleed. Monitored burden of atrial fibrillation by loop recorder in past but no recent device check.  Previously declined watchman in past.   PSVT. Chronic. Stable.  Reviewed meds.  Continue meds.  Discussed refills.    [employer] 3500 loop recorder.  No data and current record.  Likely at replacement indicator.  Shared decision making performed.  Preop cardiac evaluation performed.  All questions answered.  E consent obtained.  Will send patient over to device clinic for verification of device status unless other data pulled for preauthorization of device removal  Coronary artery disease, stable status post PCI LAD 2020.  Recent catheterization this year.  See single report.  No symptoms.  Reviewed meds.    Hypertension benign, chronic, stable.  Reviewed meds.  Continue meds.  Discussed refill Hyperlipidemia. Chronic. Stable on statin.   COPD.  Chronic.  Stable.    Right knee arthritis.  Awaiting surgery.  History of transient ischemic attack with no ongoing neurological deficits. Unable to take anticoagulation due to prior GI bleed and anemia  Gastroesophageal reflux disease. Chronic. Stable.  History of anemia from angiodysplasia of the duodenum, ulcer, gastritis, esophageal stricture and Villeda's esophagus. Chronic. Stable. Follows with PCP and GI.  Overweight     AHA recommendations for exercise, diet, and behavioral modification reviewed with pt.     The patient, son , and I discussed the mechanism of arrhythmia, loop recorder, preoperative cardiac evaluation, loop explant, last catheterization, previously Watchman declined, anticoagulation was no anticoagulation, medications, side effect medications, treatment options, risks, benefits, and imponderables. American Heart Association lifestyle changes and behavioral modification discussed. All questions answered in detail. Counseling over 50% visit regarding above. Patient  appreciative of care    Jamila Matute MD

## 2024-03-01 NOTE — PATIENT INSTRUCTIONS
Continue same medications/treatment.  Patient educated on proper medication use.  Patient educated on risk factor modification.  Please bring any lab results from other providers/physicians to your next appointment.    Please bring all medicines, vitamins, and herbal supplements with you when you come to the office.    Prescriptions will not be filled unless you are compliant with your follow up appointments or have a follow up appointment scheduled as per instruction of your physician. Refills should be requested at the time of your visit.    SCHEDULE loop recorder explant after knee surgery  Obtain lab work 1 week prior to procedure    MARIO BUENO RN, AM SCRIBING FOR AND IN THE PRESENCE OF DR. FIORELLA QUINTEROS MD, FACC, FACP, FHRS

## 2024-03-05 ENCOUNTER — PATIENT OUTREACH (OUTPATIENT)
Dept: PRIMARY CARE | Facility: CLINIC | Age: 79
End: 2024-03-05

## 2024-03-05 ENCOUNTER — APPOINTMENT (OUTPATIENT)
Dept: PRIMARY CARE | Facility: CLINIC | Age: 79
End: 2024-03-05
Payer: MEDICARE

## 2024-03-05 NOTE — PROGRESS NOTES
Unable to reach patient for call back after patient's follow up appointment with PCP 2/12/24 and cardio 2/8/24 &3/1/24.   LVM with call back number for patient to call if needed to assist with any questions or concerns patient may have.

## 2024-03-08 ENCOUNTER — PRE-ADMISSION TESTING (OUTPATIENT)
Dept: PREADMISSION TESTING | Facility: HOSPITAL | Age: 79
End: 2024-03-08
Payer: MEDICARE

## 2024-03-08 ENCOUNTER — HOSPITAL ENCOUNTER (OUTPATIENT)
Dept: RADIOLOGY | Facility: HOSPITAL | Age: 79
Discharge: HOME | End: 2024-03-08
Payer: MEDICARE

## 2024-03-08 DIAGNOSIS — Z95.818 STATUS POST PLACEMENT OF IMPLANTABLE LOOP RECORDER: ICD-10-CM

## 2024-03-08 DIAGNOSIS — M17.11 UNILATERAL PRIMARY OSTEOARTHRITIS, RIGHT KNEE: ICD-10-CM

## 2024-03-08 DIAGNOSIS — I49.5 TACHYCARDIA-BRADYCARDIA SYNDROME (MULTI): ICD-10-CM

## 2024-03-08 DIAGNOSIS — I48.0 PAROXYSMAL ATRIAL FIBRILLATION (MULTI): ICD-10-CM

## 2024-03-08 DIAGNOSIS — Z45.09 ENCOUNTER FOR LOOP RECORDER AT END OF BATTERY LIFE: ICD-10-CM

## 2024-03-08 LAB
ALBUMIN SERPL BCP-MCNC: 4 G/DL (ref 3.4–5)
ALP SERPL-CCNC: 107 U/L (ref 33–136)
ALT SERPL W P-5'-P-CCNC: 24 U/L (ref 7–45)
ANION GAP SERPL CALC-SCNC: 12 MMOL/L (ref 10–20)
APTT PPP: 32 SECONDS (ref 27–38)
AST SERPL W P-5'-P-CCNC: 28 U/L (ref 9–39)
BASOPHILS # BLD AUTO: 0.02 X10*3/UL (ref 0–0.1)
BASOPHILS NFR BLD AUTO: 0.4 %
BILIRUB SERPL-MCNC: 0.3 MG/DL (ref 0–1.2)
BUN SERPL-MCNC: 9 MG/DL (ref 6–23)
CALCIUM SERPL-MCNC: 9.1 MG/DL (ref 8.6–10.3)
CHLORIDE SERPL-SCNC: 100 MMOL/L (ref 98–107)
CO2 SERPL-SCNC: 29 MMOL/L (ref 21–32)
CREAT SERPL-MCNC: 0.59 MG/DL (ref 0.5–1.05)
EGFRCR SERPLBLD CKD-EPI 2021: >90 ML/MIN/1.73M*2
EOSINOPHIL # BLD AUTO: 0.08 X10*3/UL (ref 0–0.4)
EOSINOPHIL NFR BLD AUTO: 1.6 %
ERYTHROCYTE [DISTWIDTH] IN BLOOD BY AUTOMATED COUNT: 15.5 % (ref 11.5–14.5)
EST. AVERAGE GLUCOSE BLD GHB EST-MCNC: 137 MG/DL
GLUCOSE SERPL-MCNC: 98 MG/DL (ref 74–99)
HBA1C MFR BLD: 6.4 %
HCT VFR BLD AUTO: 34 % (ref 36–46)
HGB BLD-MCNC: 10.9 G/DL (ref 12–16)
IMM GRANULOCYTES # BLD AUTO: 0.04 X10*3/UL (ref 0–0.5)
IMM GRANULOCYTES NFR BLD AUTO: 0.8 % (ref 0–0.9)
INR PPP: 1 (ref 0.9–1.1)
LYMPHOCYTES # BLD AUTO: 1.67 X10*3/UL (ref 0.8–3)
LYMPHOCYTES NFR BLD AUTO: 32.8 %
MCH RBC QN AUTO: 28.5 PG (ref 26–34)
MCHC RBC AUTO-ENTMCNC: 32.1 G/DL (ref 32–36)
MCV RBC AUTO: 89 FL (ref 80–100)
MONOCYTES # BLD AUTO: 0.54 X10*3/UL (ref 0.05–0.8)
MONOCYTES NFR BLD AUTO: 10.6 %
NEUTROPHILS # BLD AUTO: 2.74 X10*3/UL (ref 1.6–5.5)
NEUTROPHILS NFR BLD AUTO: 53.8 %
NRBC BLD-RTO: 0 /100 WBCS (ref 0–0)
PLATELET # BLD AUTO: 373 X10*3/UL (ref 150–450)
POTASSIUM SERPL-SCNC: 4.1 MMOL/L (ref 3.5–5.3)
PROT SERPL-MCNC: 6.5 G/DL (ref 6.4–8.2)
PROTHROMBIN TIME: 10.8 SECONDS (ref 9.8–12.8)
RBC # BLD AUTO: 3.82 X10*6/UL (ref 4–5.2)
SODIUM SERPL-SCNC: 137 MMOL/L (ref 136–145)
WBC # BLD AUTO: 5.1 X10*3/UL (ref 4.4–11.3)

## 2024-03-08 PROCEDURE — 80053 COMPREHEN METABOLIC PANEL: CPT

## 2024-03-08 PROCEDURE — 73700 CT LOWER EXTREMITY W/O DYE: CPT | Mod: RIGHT SIDE | Performed by: RADIOLOGY

## 2024-03-08 PROCEDURE — 73700 CT LOWER EXTREMITY W/O DYE: CPT | Mod: RT

## 2024-03-08 PROCEDURE — 87081 CULTURE SCREEN ONLY: CPT | Mod: ELYLAB

## 2024-03-08 PROCEDURE — 85025 COMPLETE CBC W/AUTO DIFF WBC: CPT

## 2024-03-08 PROCEDURE — 83036 HEMOGLOBIN GLYCOSYLATED A1C: CPT | Mod: ELYLAB

## 2024-03-08 PROCEDURE — 36415 COLL VENOUS BLD VENIPUNCTURE: CPT

## 2024-03-08 PROCEDURE — 85610 PROTHROMBIN TIME: CPT

## 2024-03-08 RX ORDER — FERROUS SULFATE 325(65) MG
325 TABLET ORAL
COMMUNITY

## 2024-03-08 NOTE — PREPROCEDURE INSTRUCTIONS
Patient  and daughter in law here for PAT visit; labs  and MRSA swab obtained.  EKG from 2/23/24 on file. CHG skin wipes and joint instruction book given to and reviewed with patient. Written pre-op instructions reviewed with patient. Pt to watch joint video at home and has walker available. Patient and family aware to hold aspirin as directed and to take Metoprolol the morning of the procedure with a sip of water.

## 2024-03-10 LAB — STAPHYLOCOCCUS SPEC CULT: NORMAL

## 2024-03-12 ENCOUNTER — TELEPHONE (OUTPATIENT)
Dept: CARDIOLOGY | Facility: CLINIC | Age: 79
End: 2024-03-12

## 2024-03-12 ENCOUNTER — OFFICE VISIT (OUTPATIENT)
Dept: PRIMARY CARE | Facility: CLINIC | Age: 79
End: 2024-03-12
Payer: MEDICARE

## 2024-03-12 VITALS
DIASTOLIC BLOOD PRESSURE: 82 MMHG | HEART RATE: 92 BPM | WEIGHT: 180.8 LBS | OXYGEN SATURATION: 97 % | HEIGHT: 64 IN | BODY MASS INDEX: 30.87 KG/M2 | SYSTOLIC BLOOD PRESSURE: 140 MMHG | TEMPERATURE: 98.5 F

## 2024-03-12 DIAGNOSIS — D84.81 IMMUNODEFICIENCY DUE TO CONDITIONS CLASSIFIED ELSEWHERE (MULTI): ICD-10-CM

## 2024-03-12 DIAGNOSIS — Z89.422 ACQUIRED ABSENCE OF OTHER LEFT TOE(S) (MULTI): ICD-10-CM

## 2024-03-12 DIAGNOSIS — I25.10 CAD S/P PERCUTANEOUS CORONARY ANGIOPLASTY: ICD-10-CM

## 2024-03-12 DIAGNOSIS — I48.0 PAROXYSMAL ATRIAL FIBRILLATION (MULTI): ICD-10-CM

## 2024-03-12 DIAGNOSIS — J96.01 ACUTE RESPIRATORY FAILURE WITH HYPOXIA (MULTI): ICD-10-CM

## 2024-03-12 DIAGNOSIS — Z01.818 PREOPERATIVE EVALUATION TO RULE OUT SURGICAL CONTRAINDICATION: Primary | ICD-10-CM

## 2024-03-12 DIAGNOSIS — I11.0 HYPERTENSIVE HEART DISEASE WITH HEART FAILURE (MULTI): ICD-10-CM

## 2024-03-12 DIAGNOSIS — Z98.61 CAD S/P PERCUTANEOUS CORONARY ANGIOPLASTY: ICD-10-CM

## 2024-03-12 PROBLEM — J44.1 COPD EXACERBATION (MULTI): Status: RESOLVED | Noted: 2024-02-02 | Resolved: 2024-03-12

## 2024-03-12 PROBLEM — R07.9 CHEST PAIN: Status: RESOLVED | Noted: 2019-02-23 | Resolved: 2024-03-12

## 2024-03-12 PROBLEM — Z01.810 ENCOUNTER FOR PRE-OPERATIVE CARDIOVASCULAR CLEARANCE: Status: RESOLVED | Noted: 2024-02-08 | Resolved: 2024-03-12

## 2024-03-12 PROBLEM — J40 BRONCHITIS: Status: RESOLVED | Noted: 2023-10-23 | Resolved: 2024-03-12

## 2024-03-12 PROCEDURE — 1159F MED LIST DOCD IN RCRD: CPT | Performed by: INTERNAL MEDICINE

## 2024-03-12 PROCEDURE — 3077F SYST BP >= 140 MM HG: CPT | Performed by: INTERNAL MEDICINE

## 2024-03-12 PROCEDURE — 99214 OFFICE O/P EST MOD 30 MIN: CPT | Performed by: INTERNAL MEDICINE

## 2024-03-12 PROCEDURE — 1126F AMNT PAIN NOTED NONE PRSNT: CPT | Performed by: INTERNAL MEDICINE

## 2024-03-12 PROCEDURE — 1160F RVW MEDS BY RX/DR IN RCRD: CPT | Performed by: INTERNAL MEDICINE

## 2024-03-12 PROCEDURE — 3079F DIAST BP 80-89 MM HG: CPT | Performed by: INTERNAL MEDICINE

## 2024-03-12 PROCEDURE — 1036F TOBACCO NON-USER: CPT | Performed by: INTERNAL MEDICINE

## 2024-03-12 PROCEDURE — 1157F ADVNC CARE PLAN IN RCRD: CPT | Performed by: INTERNAL MEDICINE

## 2024-03-12 RX ORDER — BENZONATATE 200 MG/1
200 CAPSULE ORAL 3 TIMES DAILY PRN
Qty: 21 CAPSULE | Refills: 1 | Status: SHIPPED | OUTPATIENT
Start: 2024-03-12 | End: 2024-04-11

## 2024-03-12 RX ORDER — MONTELUKAST SODIUM 10 MG/1
10 TABLET ORAL NIGHTLY
Qty: 30 TABLET | Refills: 5 | Status: SHIPPED | OUTPATIENT
Start: 2024-03-12 | End: 2024-09-08

## 2024-03-12 ASSESSMENT — ENCOUNTER SYMPTOMS
OCCASIONAL FEELINGS OF UNSTEADINESS: 0
DEPRESSION: 0
LOSS OF SENSATION IN FEET: 0

## 2024-03-12 NOTE — TELEPHONE ENCOUNTER
Per Dr. Manfred Fitzpatrick MD [facility], patient cleared for procedure as requested.   Patient can hold ASA x7 days preop if needed.   Form signed and faxed with confirmation received. Placed to scanning.

## 2024-03-12 NOTE — TELEPHONE ENCOUNTER
Patient seen recently for POC for pending Right total knee replacement with DANE with Dr. Bertha MD on 3/22/2024.     Patient was seen with Dr. Manfred Fitzpatrick MD [facility] 2/8/24:  Rae Lundy is a 79 y.o. year old female patient with history of severe COPD she was admitted to the hospital with significant shortness of breath and exacerbation of COPD she is discharged on home O2.  She will be scheduled for knee surgery however the patient has significant coronary disease.  She is known to have occluded right coronary artery with collaterals and had significant disease of LAD which showed the vessel just she is living.  She underwent PTCA with stenting of the LAD about 4 years ago.  She is unable to do a stress test because of significant COPD and most likely will be abnormal due to her known coronary disease.  We do need to do clearance for her surgery.  Her EKG showed sinus rhythm with poor R wave progression.  I had a very lengthy discussion with the patient today that we do need to identify her coronary anatomy prior to her surgery because of a very high risk coronary anatomy in the past.  Will go ahead with coronary angiography to evaluate her risk and based on that further recommendation will be made.     Coronary Angiography 2/23/24:  CONCLUSIONS:   1. Left Main Coronary Artery: This artery is normal.   2. Left Anterior Descending Artery: presents luminal irregularities and contains patent previously placed stents.   3. Circumflex Coronary Artery: presents luminal irregularities.   4. Right Coronary Artery: significantly obstructed and fills via collaterals.   5. Mid RCA Lesion: The percent stenosis is 100%.   6. The Left Ventricular Ejection Fraction is 55%.    Will place form for Dr. Manfred Fitzpatrick MD [facility] review today in clinic.   Patient is on ASA

## 2024-03-13 ENCOUNTER — APPOINTMENT (OUTPATIENT)
Dept: RADIOLOGY | Facility: HOSPITAL | Age: 79
End: 2024-03-13
Payer: MEDICARE

## 2024-03-13 ENCOUNTER — APPOINTMENT (OUTPATIENT)
Dept: CARDIOLOGY | Facility: HOSPITAL | Age: 79
End: 2024-03-13
Payer: MEDICARE

## 2024-03-13 ENCOUNTER — HOSPITAL ENCOUNTER (EMERGENCY)
Facility: HOSPITAL | Age: 79
Discharge: HOME | End: 2024-03-13
Attending: EMERGENCY MEDICINE
Payer: MEDICARE

## 2024-03-13 VITALS
HEART RATE: 92 BPM | HEIGHT: 64 IN | RESPIRATION RATE: 18 BRPM | WEIGHT: 180 LBS | SYSTOLIC BLOOD PRESSURE: 163 MMHG | OXYGEN SATURATION: 97 % | BODY MASS INDEX: 30.73 KG/M2 | DIASTOLIC BLOOD PRESSURE: 75 MMHG

## 2024-03-13 DIAGNOSIS — R07.9 CHEST PAIN, UNSPECIFIED TYPE: Primary | ICD-10-CM

## 2024-03-13 DIAGNOSIS — R91.8 PULMONARY INFILTRATES: ICD-10-CM

## 2024-03-13 LAB
ALBUMIN SERPL BCP-MCNC: 3.9 G/DL (ref 3.4–5)
ALP SERPL-CCNC: 102 U/L (ref 33–136)
ALT SERPL W P-5'-P-CCNC: 20 U/L (ref 7–45)
ANION GAP SERPL CALC-SCNC: 13 MMOL/L (ref 10–20)
AST SERPL W P-5'-P-CCNC: 22 U/L (ref 9–39)
BASOPHILS # BLD AUTO: 0.04 X10*3/UL (ref 0–0.1)
BASOPHILS NFR BLD AUTO: 0.5 %
BILIRUB SERPL-MCNC: 0.4 MG/DL (ref 0–1.2)
BNP SERPL-MCNC: 15 PG/ML (ref 0–99)
BUN SERPL-MCNC: 8 MG/DL (ref 6–23)
CALCIUM SERPL-MCNC: 9 MG/DL (ref 8.6–10.3)
CARDIAC TROPONIN I PNL SERPL HS: 3 NG/L (ref 0–13)
CARDIAC TROPONIN I PNL SERPL HS: 4 NG/L (ref 0–13)
CHLORIDE SERPL-SCNC: 100 MMOL/L (ref 98–107)
CO2 SERPL-SCNC: 26 MMOL/L (ref 21–32)
CREAT SERPL-MCNC: 0.62 MG/DL (ref 0.5–1.05)
EGFRCR SERPLBLD CKD-EPI 2021: >90 ML/MIN/1.73M*2
EOSINOPHIL # BLD AUTO: 0.13 X10*3/UL (ref 0–0.4)
EOSINOPHIL NFR BLD AUTO: 1.6 %
ERYTHROCYTE [DISTWIDTH] IN BLOOD BY AUTOMATED COUNT: 15.7 % (ref 11.5–14.5)
FLUAV RNA RESP QL NAA+PROBE: NOT DETECTED
FLUBV RNA RESP QL NAA+PROBE: NOT DETECTED
GLUCOSE SERPL-MCNC: 123 MG/DL (ref 74–99)
HCT VFR BLD AUTO: 33.2 % (ref 36–46)
HGB BLD-MCNC: 10.9 G/DL (ref 12–16)
HOLD SPECIMEN: NORMAL
IMM GRANULOCYTES # BLD AUTO: 0.03 X10*3/UL (ref 0–0.5)
IMM GRANULOCYTES NFR BLD AUTO: 0.4 % (ref 0–0.9)
LACTATE SERPL-SCNC: 1.5 MMOL/L (ref 0.4–2)
LIPASE SERPL-CCNC: 15 U/L (ref 9–82)
LYMPHOCYTES # BLD AUTO: 1.83 X10*3/UL (ref 0.8–3)
LYMPHOCYTES NFR BLD AUTO: 22.5 %
MAGNESIUM SERPL-MCNC: 1.85 MG/DL (ref 1.6–2.4)
MCH RBC QN AUTO: 29.3 PG (ref 26–34)
MCHC RBC AUTO-ENTMCNC: 32.8 G/DL (ref 32–36)
MCV RBC AUTO: 89 FL (ref 80–100)
MONOCYTES # BLD AUTO: 0.67 X10*3/UL (ref 0.05–0.8)
MONOCYTES NFR BLD AUTO: 8.2 %
NEUTROPHILS # BLD AUTO: 5.44 X10*3/UL (ref 1.6–5.5)
NEUTROPHILS NFR BLD AUTO: 66.8 %
NRBC BLD-RTO: 0 /100 WBCS (ref 0–0)
PLATELET # BLD AUTO: 312 X10*3/UL (ref 150–450)
POTASSIUM SERPL-SCNC: 4.1 MMOL/L (ref 3.5–5.3)
PROT SERPL-MCNC: 6.7 G/DL (ref 6.4–8.2)
RBC # BLD AUTO: 3.72 X10*6/UL (ref 4–5.2)
RSV RNA RESP QL NAA+PROBE: NOT DETECTED
SARS-COV-2 RNA RESP QL NAA+PROBE: NOT DETECTED
SODIUM SERPL-SCNC: 135 MMOL/L (ref 136–145)
WBC # BLD AUTO: 8.1 X10*3/UL (ref 4.4–11.3)

## 2024-03-13 PROCEDURE — 83605 ASSAY OF LACTIC ACID: CPT | Performed by: PHYSICIAN ASSISTANT

## 2024-03-13 PROCEDURE — 87637 SARSCOV2&INF A&B&RSV AMP PRB: CPT | Performed by: PHYSICIAN ASSISTANT

## 2024-03-13 PROCEDURE — 2550000001 HC RX 255 CONTRASTS: Performed by: EMERGENCY MEDICINE

## 2024-03-13 PROCEDURE — 80053 COMPREHEN METABOLIC PANEL: CPT | Performed by: PHYSICIAN ASSISTANT

## 2024-03-13 PROCEDURE — 83735 ASSAY OF MAGNESIUM: CPT | Performed by: PHYSICIAN ASSISTANT

## 2024-03-13 PROCEDURE — 96360 HYDRATION IV INFUSION INIT: CPT

## 2024-03-13 PROCEDURE — 93005 ELECTROCARDIOGRAM TRACING: CPT

## 2024-03-13 PROCEDURE — 71275 CT ANGIOGRAPHY CHEST: CPT | Mod: FOREIGN READ | Performed by: RADIOLOGY

## 2024-03-13 PROCEDURE — 94640 AIRWAY INHALATION TREATMENT: CPT

## 2024-03-13 PROCEDURE — 71045 X-RAY EXAM CHEST 1 VIEW: CPT

## 2024-03-13 PROCEDURE — 84484 ASSAY OF TROPONIN QUANT: CPT | Performed by: PHYSICIAN ASSISTANT

## 2024-03-13 PROCEDURE — 71045 X-RAY EXAM CHEST 1 VIEW: CPT | Mod: FOREIGN READ | Performed by: RADIOLOGY

## 2024-03-13 PROCEDURE — 85025 COMPLETE CBC W/AUTO DIFF WBC: CPT | Performed by: PHYSICIAN ASSISTANT

## 2024-03-13 PROCEDURE — 83880 ASSAY OF NATRIURETIC PEPTIDE: CPT | Performed by: PHYSICIAN ASSISTANT

## 2024-03-13 PROCEDURE — 2500000004 HC RX 250 GENERAL PHARMACY W/ HCPCS (ALT 636 FOR OP/ED): Performed by: PHYSICIAN ASSISTANT

## 2024-03-13 PROCEDURE — 36415 COLL VENOUS BLD VENIPUNCTURE: CPT | Performed by: PHYSICIAN ASSISTANT

## 2024-03-13 PROCEDURE — 96361 HYDRATE IV INFUSION ADD-ON: CPT

## 2024-03-13 PROCEDURE — 71275 CT ANGIOGRAPHY CHEST: CPT

## 2024-03-13 PROCEDURE — 99285 EMERGENCY DEPT VISIT HI MDM: CPT | Mod: 25

## 2024-03-13 PROCEDURE — 83690 ASSAY OF LIPASE: CPT | Performed by: PHYSICIAN ASSISTANT

## 2024-03-13 PROCEDURE — 2500000002 HC RX 250 W HCPCS SELF ADMINISTERED DRUGS (ALT 637 FOR MEDICARE OP, ALT 636 FOR OP/ED): Performed by: PHYSICIAN ASSISTANT

## 2024-03-13 PROCEDURE — 93971 EXTREMITY STUDY: CPT

## 2024-03-13 RX ORDER — ALBUTEROL SULFATE 0.83 MG/ML
2.5 SOLUTION RESPIRATORY (INHALATION) EVERY 20 MIN
Status: COMPLETED | OUTPATIENT
Start: 2024-03-13 | End: 2024-03-13

## 2024-03-13 RX ORDER — IPRATROPIUM BROMIDE AND ALBUTEROL SULFATE 2.5; .5 MG/3ML; MG/3ML
3 SOLUTION RESPIRATORY (INHALATION) ONCE
Status: COMPLETED | OUTPATIENT
Start: 2024-03-13 | End: 2024-03-13

## 2024-03-13 RX ORDER — DOXYCYCLINE 100 MG/1
100 TABLET ORAL 2 TIMES DAILY
Qty: 14 TABLET | Refills: 0 | Status: SHIPPED | OUTPATIENT
Start: 2024-03-14 | End: 2024-03-24 | Stop reason: HOSPADM

## 2024-03-13 RX ORDER — MAGNESIUM SULFATE HEPTAHYDRATE 40 MG/ML
2 INJECTION, SOLUTION INTRAVENOUS ONCE
Status: COMPLETED | OUTPATIENT
Start: 2024-03-13 | End: 2024-03-13

## 2024-03-13 RX ADMIN — IOHEXOL 75 ML: 350 INJECTION, SOLUTION INTRAVENOUS at 12:17

## 2024-03-13 RX ADMIN — MAGNESIUM SULFATE HEPTAHYDRATE 2 G: 40 INJECTION, SOLUTION INTRAVENOUS at 09:40

## 2024-03-13 RX ADMIN — ALBUTEROL SULFATE 2.5 MG: 2.5 SOLUTION RESPIRATORY (INHALATION) at 09:41

## 2024-03-13 RX ADMIN — ALBUTEROL SULFATE 2.5 MG: 2.5 SOLUTION RESPIRATORY (INHALATION) at 10:35

## 2024-03-13 RX ADMIN — IPRATROPIUM BROMIDE AND ALBUTEROL SULFATE 3 ML: 2.5; .5 SOLUTION RESPIRATORY (INHALATION) at 09:42

## 2024-03-13 ASSESSMENT — PAIN DESCRIPTION - DESCRIPTORS
DESCRIPTORS: PRESSURE

## 2024-03-13 ASSESSMENT — ENCOUNTER SYMPTOMS
JOINT SWELLING: 0
FEVER: 0
HEMATURIA: 0
ACTIVITY CHANGE: 0
EYE REDNESS: 0
LIGHT-HEADEDNESS: 0
SPEECH DIFFICULTY: 0
CHEST TIGHTNESS: 0
BLOOD IN STOOL: 0
PALPITATIONS: 0
STRIDOR: 0

## 2024-03-13 ASSESSMENT — PAIN DESCRIPTION - PAIN TYPE: TYPE: ACUTE PAIN

## 2024-03-13 ASSESSMENT — LIFESTYLE VARIABLES
EVER HAD A DRINK FIRST THING IN THE MORNING TO STEADY YOUR NERVES TO GET RID OF A HANGOVER: NO
HAVE PEOPLE ANNOYED YOU BY CRITICIZING YOUR DRINKING: NO
HAVE YOU EVER FELT YOU SHOULD CUT DOWN ON YOUR DRINKING: NO
EVER FELT BAD OR GUILTY ABOUT YOUR DRINKING: NO

## 2024-03-13 ASSESSMENT — PAIN SCALES - GENERAL
PAINLEVEL_OUTOF10: 4

## 2024-03-13 ASSESSMENT — PAIN - FUNCTIONAL ASSESSMENT: PAIN_FUNCTIONAL_ASSESSMENT: 0-10

## 2024-03-13 ASSESSMENT — PAIN DESCRIPTION - FREQUENCY: FREQUENCY: CONSTANT/CONTINUOUS

## 2024-03-13 ASSESSMENT — PAIN DESCRIPTION - LOCATION: LOCATION: CHEST

## 2024-03-13 NOTE — PROGRESS NOTES
"CHIEF COMPLAINT:    Rae Lundy is a 79 y.o. female who presents for Pre-op Exam (Patient here for pre-operative examination, Dr. Stone, right total knee on 3/22/24.).    HISTORY OF PRESENT ILLNESS:  Rae Lundy  is a pleasant 79-year-old  female comes here for presurgical risk stratification.  She is going for an elective right knee arthroplasty.  Patient does have coronary artery disease and also previous history of PCI.  She has already seen her cardiology who performed cardiac catheterization.  Her ejection fraction is 55%.  I have reviewed her echocardiogram report.  Otherwise patient is doing well.  She does have chronic cough.  She suffered from COVID 19 and since then she has long-haul of COVID-19.  She had multiple hospitalization in the past for pneumonia like symptoms.  Patient also has diabetes which is under good control with last A1c of 6.4.  Patient also has osteoporosis for which she takes bisphosphonate.  Her atrial fibrillation is managed with the beta-blocker rate controlled she is not currently on any anticoagulant.        Review of Systems   Constitutional:  Negative for activity change and fever.   HENT:  Negative for hearing loss, nosebleeds and tinnitus.    Eyes:  Negative for redness.   Respiratory:  Negative for chest tightness and stridor.    Cardiovascular:  Negative for chest pain, palpitations and leg swelling.   Gastrointestinal:  Negative for blood in stool.   Endocrine: Negative for cold intolerance.   Genitourinary:  Negative for hematuria.   Musculoskeletal:  Negative for joint swelling.   Skin:  Negative for rash.   Neurological:  Negative for speech difficulty and light-headedness.   Psychiatric/Behavioral:  Negative for behavioral problems.      Visit Vitals  /82 (BP Location: Left arm, Patient Position: Sitting, BP Cuff Size: Adult)   Pulse 92   Temp 36.9 °C (98.5 °F) (Temporal)   Ht 1.626 m (5' 4\")   Wt 82 kg (180 lb 12.8 oz)   SpO2 97%   BMI 31.03 kg/m² "   OB Status Postmenopausal   Smoking Status Former   BSA 1.92 m²         Wt Readings from Last 10 Encounters:   03/12/24 82 kg (180 lb 12.8 oz)   03/01/24 79.8 kg (176 lb)   02/23/24 82.3 kg (181 lb 7 oz)   02/12/24 79.6 kg (175 lb 6.4 oz)   02/08/24 79.4 kg (175 lb 1.6 oz)   02/07/24 90.7 kg (200 lb)   02/03/24 88.4 kg (194 lb 14.2 oz)   01/08/24 80.3 kg (177 lb)   11/16/23 68 kg (149 lb 14.6 oz)   10/25/23 80.9 kg (178 lb 4 oz)       Physical Exam  Constitutional:       General: She is not in acute distress.     Appearance: Normal appearance.   HENT:      Head: Normocephalic.      Right Ear: Tympanic membrane normal.      Left Ear: Tympanic membrane normal.      Mouth/Throat:      Mouth: Mucous membranes are moist.   Cardiovascular:      Rate and Rhythm: Normal rate and regular rhythm.      Heart sounds: No murmur heard.  Pulmonary:      Effort: No respiratory distress.   Abdominal:      Palpations: Abdomen is soft.   Musculoskeletal:      Cervical back: Neck supple.      Right lower leg: No edema.      Left lower leg: No edema.   Skin:     Findings: No rash.   Neurological:      General: No focal deficit present.      Mental Status: She is alert and oriented to person, place, and time.   Psychiatric:         Mood and Affect: Mood normal.        RECENT LABS:  Lab Results   Component Value Date    WBC 5.1 03/08/2024    HGB 10.9 (L) 03/08/2024    HCT 34.0 (L) 03/08/2024     03/08/2024    CHOL 143 11/04/2022    TRIG 97 11/04/2022    HDL 56.0 11/04/2022    ALT 24 03/08/2024    AST 28 03/08/2024     03/08/2024    K 4.1 03/08/2024     03/08/2024    CREATININE 0.59 03/08/2024    BUN 9 03/08/2024    CO2 29 03/08/2024    TSH 2.63 11/04/2022    INR 1.0 03/08/2024    HGBA1C 6.4 (H) 03/08/2024     IMAGING:    XR CHEST 1 VIEW  1.  Bibasilar platelike atelectasis versus linear scars.      === 03/08/24 ===  CT KNEE RIGHT WO IV CONTRAST  - Impression -  1. Severe degenerative change of the right knee with small  volume  joint effusion.  2. Moderate to severe degenerative change of the right hip with  possible small joint effusion.    MEDICATIONS:   Current Outpatient Medications   Medication Instructions    acetaminophen (Tylenol 8 HOUR) 650 mg ER tablet 2 tablets, oral, Daily    alendronate (FOSAMAX) 70 mg, oral, Weekly, Monday      aspirin 81 mg EC tablet 1 tablet, oral, Daily    benzonatate (TESSALON) 200 mg, oral, 3 times daily PRN, Do not crush or chew.    ergocalciferol (VITAMIN D-2) 1.25 mg, oral, Weekly, Monday     ferrous sulfate (325 mg ferrous sulfate) 325 mg, oral    hydroCHLOROthiazide (Microzide) 12.5 mg capsule 1 capsule, oral, Daily    ibuprofen 600 mg, oral, Every 6 hours PRN    ibuprofen 400 mg, oral, Every 6 hours PRN    ipratropium-albuteroL (Duo-Neb) 0.5-2.5 mg/3 mL nebulizer solution 3 mL, nebulization, Daily PRN    isosorbide mononitrate ER (Imdur) 30 mg 24 hr tablet 1 tablet, oral, Daily    magnesium oxide (Mag-Ox) 400 mg (241.3 mg magnesium) tablet 1 tablet, oral, Daily    metoprolol tartrate (LOPRESSOR) 25 mg, oral, 2 times daily    mirtazapine (REMERON) 15 mg, oral, Nightly    montelukast (SINGULAIR) 10 mg, oral, Nightly    multivit-min/ferrous fumarate (MULTI VITAMIN ORAL) 1 tablet, oral, Daily    nitroglycerin (NITROSTAT) 0.4 mg, sublingual, Every 5 min PRN    omeprazole (PRILOSEC) 20 mg, oral, Daily before breakfast, Do not crush or chew.    oxygen (O2) 5 L/min, inhalation, Every 24 hours    potassium chloride CR 10 mEq ER tablet 1 tablet, oral, Daily      TODAY'S VISIT  DX:   1. Preoperative evaluation to rule out surgical contraindication        2. Immunodeficiency due to conditions classified elsewhere (CMS/[facility])        3. Acquired absence of other left toe(s) (CMS/[facility])        4. Acute respiratory failure with hypoxia (CMS/HCC)  benzonatate (Tessalon) 200 mg capsule    montelukast (Singulair) 10 mg tablet      5. Hypertensive heart disease with heart failure (CMS/[facility])        6. CAD S/P  percutaneous coronary angioplasty        7. Paroxysmal atrial fibrillation (CMS/[facility])           MEDICAL DECISION MAKING:  - Recent lab work and relevant imaging studies have been reviewed.    - The current active medical co morbidities have been considered.   - Relevant correspondence/notes from specialty consultants were reviewed and discussed with patient.    -Patient has acceptable risk to undergo right hip arthroplasty from medical point of view.  -She will also get the presurgical clearance from cardiology.    -Patient was told to increase her aerosolized neb treatment prior to the surgery 3 times a day.  -Patient was given Tessalon Perles and also Singulair for allergy.    - Next Follow up after right hip arthroplasty..

## 2024-03-13 NOTE — H&P (VIEW-ONLY)
"CHIEF COMPLAINT:    Rae Lundy is a 79 y.o. female who presents for Pre-op Exam (Patient here for pre-operative examination, Dr. Stone, right total knee on 3/22/24.).    HISTORY OF PRESENT ILLNESS:  Rae Lundy  is a pleasant 79-year-old  female comes here for presurgical risk stratification.  She is going for an elective right knee arthroplasty.  Patient does have coronary artery disease and also previous history of PCI.  She has already seen her cardiology who performed cardiac catheterization.  Her ejection fraction is 55%.  I have reviewed her echocardiogram report.  Otherwise patient is doing well.  She does have chronic cough.  She suffered from COVID 19 and since then she has long-haul of COVID-19.  She had multiple hospitalization in the past for pneumonia like symptoms.  Patient also has diabetes which is under good control with last A1c of 6.4.  Patient also has osteoporosis for which she takes bisphosphonate.  Her atrial fibrillation is managed with the beta-blocker rate controlled she is not currently on any anticoagulant.        Review of Systems   Constitutional:  Negative for activity change and fever.   HENT:  Negative for hearing loss, nosebleeds and tinnitus.    Eyes:  Negative for redness.   Respiratory:  Negative for chest tightness and stridor.    Cardiovascular:  Negative for chest pain, palpitations and leg swelling.   Gastrointestinal:  Negative for blood in stool.   Endocrine: Negative for cold intolerance.   Genitourinary:  Negative for hematuria.   Musculoskeletal:  Negative for joint swelling.   Skin:  Negative for rash.   Neurological:  Negative for speech difficulty and light-headedness.   Psychiatric/Behavioral:  Negative for behavioral problems.      Visit Vitals  /82 (BP Location: Left arm, Patient Position: Sitting, BP Cuff Size: Adult)   Pulse 92   Temp 36.9 °C (98.5 °F) (Temporal)   Ht 1.626 m (5' 4\")   Wt 82 kg (180 lb 12.8 oz)   SpO2 97%   BMI 31.03 kg/m² "   OB Status Postmenopausal   Smoking Status Former   BSA 1.92 m²         Wt Readings from Last 10 Encounters:   03/12/24 82 kg (180 lb 12.8 oz)   03/01/24 79.8 kg (176 lb)   02/23/24 82.3 kg (181 lb 7 oz)   02/12/24 79.6 kg (175 lb 6.4 oz)   02/08/24 79.4 kg (175 lb 1.6 oz)   02/07/24 90.7 kg (200 lb)   02/03/24 88.4 kg (194 lb 14.2 oz)   01/08/24 80.3 kg (177 lb)   11/16/23 68 kg (149 lb 14.6 oz)   10/25/23 80.9 kg (178 lb 4 oz)       Physical Exam  Constitutional:       General: She is not in acute distress.     Appearance: Normal appearance.   HENT:      Head: Normocephalic.      Right Ear: Tympanic membrane normal.      Left Ear: Tympanic membrane normal.      Mouth/Throat:      Mouth: Mucous membranes are moist.   Cardiovascular:      Rate and Rhythm: Normal rate and regular rhythm.      Heart sounds: No murmur heard.  Pulmonary:      Effort: No respiratory distress.   Abdominal:      Palpations: Abdomen is soft.   Musculoskeletal:      Cervical back: Neck supple.      Right lower leg: No edema.      Left lower leg: No edema.   Skin:     Findings: No rash.   Neurological:      General: No focal deficit present.      Mental Status: She is alert and oriented to person, place, and time.   Psychiatric:         Mood and Affect: Mood normal.        RECENT LABS:  Lab Results   Component Value Date    WBC 5.1 03/08/2024    HGB 10.9 (L) 03/08/2024    HCT 34.0 (L) 03/08/2024     03/08/2024    CHOL 143 11/04/2022    TRIG 97 11/04/2022    HDL 56.0 11/04/2022    ALT 24 03/08/2024    AST 28 03/08/2024     03/08/2024    K 4.1 03/08/2024     03/08/2024    CREATININE 0.59 03/08/2024    BUN 9 03/08/2024    CO2 29 03/08/2024    TSH 2.63 11/04/2022    INR 1.0 03/08/2024    HGBA1C 6.4 (H) 03/08/2024     IMAGING:    XR CHEST 1 VIEW  1.  Bibasilar platelike atelectasis versus linear scars.      === 03/08/24 ===  CT KNEE RIGHT WO IV CONTRAST  - Impression -  1. Severe degenerative change of the right knee with small  volume  joint effusion.  2. Moderate to severe degenerative change of the right hip with  possible small joint effusion.    MEDICATIONS:   Current Outpatient Medications   Medication Instructions    acetaminophen (Tylenol 8 HOUR) 650 mg ER tablet 2 tablets, oral, Daily    alendronate (FOSAMAX) 70 mg, oral, Weekly, Monday      aspirin 81 mg EC tablet 1 tablet, oral, Daily    benzonatate (TESSALON) 200 mg, oral, 3 times daily PRN, Do not crush or chew.    ergocalciferol (VITAMIN D-2) 1.25 mg, oral, Weekly, Monday     ferrous sulfate (325 mg ferrous sulfate) 325 mg, oral    hydroCHLOROthiazide (Microzide) 12.5 mg capsule 1 capsule, oral, Daily    ibuprofen 600 mg, oral, Every 6 hours PRN    ibuprofen 400 mg, oral, Every 6 hours PRN    ipratropium-albuteroL (Duo-Neb) 0.5-2.5 mg/3 mL nebulizer solution 3 mL, nebulization, Daily PRN    isosorbide mononitrate ER (Imdur) 30 mg 24 hr tablet 1 tablet, oral, Daily    magnesium oxide (Mag-Ox) 400 mg (241.3 mg magnesium) tablet 1 tablet, oral, Daily    metoprolol tartrate (LOPRESSOR) 25 mg, oral, 2 times daily    mirtazapine (REMERON) 15 mg, oral, Nightly    montelukast (SINGULAIR) 10 mg, oral, Nightly    multivit-min/ferrous fumarate (MULTI VITAMIN ORAL) 1 tablet, oral, Daily    nitroglycerin (NITROSTAT) 0.4 mg, sublingual, Every 5 min PRN    omeprazole (PRILOSEC) 20 mg, oral, Daily before breakfast, Do not crush or chew.    oxygen (O2) 5 L/min, inhalation, Every 24 hours    potassium chloride CR 10 mEq ER tablet 1 tablet, oral, Daily      TODAY'S VISIT  DX:   1. Preoperative evaluation to rule out surgical contraindication        2. Immunodeficiency due to conditions classified elsewhere (CMS/[facility])        3. Acquired absence of other left toe(s) (CMS/[facility])        4. Acute respiratory failure with hypoxia (CMS/HCC)  benzonatate (Tessalon) 200 mg capsule    montelukast (Singulair) 10 mg tablet      5. Hypertensive heart disease with heart failure (CMS/[facility])        6. CAD S/P  percutaneous coronary angioplasty        7. Paroxysmal atrial fibrillation (CMS/[facility])           MEDICAL DECISION MAKING:  - Recent lab work and relevant imaging studies have been reviewed.    - The current active medical co morbidities have been considered.   - Relevant correspondence/notes from specialty consultants were reviewed and discussed with patient.    -Patient has acceptable risk to undergo right hip arthroplasty from medical point of view.  -She will also get the presurgical clearance from cardiology.    -Patient was told to increase her aerosolized neb treatment prior to the surgery 3 times a day.  -Patient was given Tessalon Perles and also Singulair for allergy.    - Next Follow up after right hip arthroplasty..

## 2024-03-13 NOTE — ED PROVIDER NOTES
HPI   Chief Complaint   Patient presents with    Chest Pain     Left upper Chest pain since 2am this morning       A 79-year-old female patient comes in the emergency department today with complaints of left-sided chest discomfort over the last 2 days.  She describes that the pain got really bad this morning so she decided to call EMS.  Patient got aspirin, IV fentanyl for pain via squad.  Rates pain 3 out of 10 on the pain scale.  States she is also had a very productive cough.  States she does wear oxygen at night from her COPD but states she has had to wear during the day.  She also describes some left lower extremity edema.  She not sure what this is from.  This purpose she comes in the emergency department today for further evaluation.  Denies any prior cardiac stents or cardiac conditions.                          [place] Coma Scale Score: 15                     Patient History   Past Medical History:   Diagnosis Date    Acute candidiasis of vulva and vagina 10/22/2018    Yeast infection of the vagina    Anemia     Anxiety     Anxiety disorder, unspecified     Anxiety and depression    Arrhythmia     Arthritis     Atrial fibrillation (CMS/[facility])     Body mass index (BMI) 29.0-29.9, adult 03/04/2022    BMI 29.0-29.9,adult    Bunion of left foot     Bunion, left    Candidiasis of skin and nail 10/22/2018    Yeast infection of the skin    COPD (chronic obstructive pulmonary disease) (CMS/HCC)     Coronary artery disease     COVID-19 04/25/2022    COVID-19 virus infection    Gastro-esophageal reflux disease without esophagitis     Chronic GERD    Heart disease     Hyperlipidemia     Hypertension     Joint pain     Limited mobility     Nontoxic multinodular goiter     Multinodular goiter    Overweight 03/04/2022    Overweight    Personal history of other diseases of the digestive system 01/17/2019    History of gastroesophageal reflux (GERD)    Personal history of other diseases of the musculoskeletal system and  connective tissue     History of osteopenia    Personal history of other diseases of the nervous system and sense organs     History of hearing loss    Personal history of other endocrine, nutritional and metabolic disease     History of hyperlipidemia    Personal history of other infectious and parasitic diseases     History of herpes zoster    Pneumonia, unspecified organism     Community acquired pneumonia    Spondylosis without myelopathy or radiculopathy, cervical region     Osteoarthritis of cervical spine    TIA (transient ischemic attack)     Urinary incontinence     Use of cane as ambulatory aid     Wears glasses      Past Surgical History:   Procedure Laterality Date    BLADDER SURGERY      BREAST LUMPECTOMY  09/28/2017    Left Breast Lumpectomy    BUNIONECTOMY      CARDIAC CATHETERIZATION Left 02/23/2024    Procedure: Left Heart Cath, With LV;  Surgeon: Manfred Fitzpatrick MD;  Location: [facility];  Service: Cardiovascular;  Laterality: Left;  Left    CHOLECYSTECTOMY  09/28/2017    Cholecystectomy    COLONOSCOPY      CORONARY ANGIOPLASTY      CORONARY STENT PLACEMENT      CT HEAD ANGIO W AND WO IV CONTRAST  04/10/2019    CT HEAD ANGIO W AND WO IV CONTRAST 4/10/2019 [facility]    FOOT SURGERY      KNEE ARTHROSCOPY W/ DEBRIDEMENT  09/28/2017    Arthroscopy Knee Right    MR HEAD ANGIO WO IV CONTRAST  09/05/2019    MR HEAD ANGIO WO IV CONTRAST 9/5/2019 [facility]    MR HEAD ANGIO WO IV CONTRAST  01/19/2020    MR HEAD ANGIO WO IV CONTRAST 1/19/2020 [facility]    MR NECK ANGIO WO IV CONTRAST  01/19/2020    MR NECK ANGIO WO IV CONTRAST 1/19/2020 [facility] CL[facility]GAC[facility]    OTHER SURGICAL HISTORY  09/28/2017    Surgery Foot Amputation Metatarsal And Toe    OTHER SURGICAL HISTORY  09/07/2022    Loop recorder insertion    THYROID SURGERY       Family History   Problem Relation Name Age of Onset    Arthritis Mother      Heart failure Mother      Esophageal cancer Mother      Rheumatic  fever Mother      Other (heart problem) Mother      Esophageal cancer Father      Other (cardiac disorder) Son       Social History     Tobacco Use    Smoking status: Former     Packs/day: 1.00     Years: 10.00     Additional pack years: 0.00     Total pack years: 10.00     Types: Cigarettes     Quit date:      Years since quittin.2    Smokeless tobacco: Never   Vaping Use    Vaping Use: Never used   Substance Use Topics    Alcohol use: Never    Drug use: Never       Physical Exam   ED Triage Vitals [24 0927]   Temp Heart Rate Respirations BP   -- 89 20 148/76      Pulse Ox Temp src Heart Rate Source Patient Position   (!) 92 % -- Monitor --      BP Location FiO2 (%)     -- --       Physical Exam  Constitutional:       General: She is not in acute distress.     Appearance: Normal appearance. She is well-developed. She is not ill-appearing or diaphoretic.   HENT:      Head: Normocephalic and atraumatic.      Nose: Nose normal.   Eyes:      Extraocular Movements: Extraocular movements intact.      Conjunctiva/sclera: Conjunctivae normal.      Pupils: Pupils are equal, round, and reactive to light.   Cardiovascular:      Rate and Rhythm: Normal rate and regular rhythm.   Pulmonary:      Effort: Pulmonary effort is normal. No respiratory distress.      Breath sounds: No stridor. Wheezing present.   Musculoskeletal:         General: Normal range of motion.      Cervical back: Normal range of motion.   Skin:     General: Skin is warm and dry.   Neurological:      General: No focal deficit present.      Mental Status: She is alert and oriented to person, place, and time. Mental status is at baseline.   Psychiatric:         Mood and Affect: Mood normal.         ED Course & MDM   Diagnoses as of 24 1305   Chest pain, unspecified type   Pulmonary infiltrates       Medical Decision Making  A 79-year-old female patient comes in the emergency department today with complaints of left-sided chest discomfort  over the last 2 days.  She describes that the pain got really bad this morning so she decided to call EMS.  Patient got aspirin, IV fentanyl for pain via squad.  Rates pain 3 out of 10 on the pain scale.  States she is also had a very productive cough.  States she does wear oxygen at night from her COPD but states she has had to wear during the day.  She also describes some left lower extremity edema.  She not sure what this is from.  This purpose she comes in the emergency department today for further evaluation.  Denies any prior cardiac stents or cardiac conditions.    EKG, chest x-ray, laboratory studies are ordered.  CT PE study as well as ultrasound study of the left lower extremity ordered.  Rule out DVT rule out pulmonary emboli rule out pneumonia, rule out pulmonary congestion or pneumothorax.  Rule ACS, arrhythmias, electrolyte abnormalities, leukocytosis or left shift.    Repeat EKG, my EKG interpretation, 87 bpm, normal sinus rhythm, no ST elevations, T wave abnormalities.    Patient has negative delta troponins negative for influenza COVID-19 negative RSV negative BNP.  Lipase negative.  CMP negative mag negative lactate negative CBC shows no elevated white blood cell count or left shift.  Patient CT PE study shows no pulmonary emboli.  Patient does have some infiltrates.  Patient's ultrasound study is negative for DVT.  Patient's infiltrates seem chronic as they discussed them on chest x-ray and they say that are on change from prior exam.    Will place patient on p.o. antibiotics discharge patient home.  Will patient follow-up with her PCP.  Patient recently had a negative cardiac cath last month.  Patient has been following with her cardiologist.    Historians patient    Diagnosis: Intermittent left chest pain, pulmonary infiltrates      Labs Reviewed   CBC WITH AUTO DIFFERENTIAL - Abnormal       Result Value    WBC 8.1      nRBC 0.0      RBC 3.72 (*)     Hemoglobin 10.9 (*)     Hematocrit 33.2 (*)      MCV 89      MCH 29.3      MCHC 32.8      RDW 15.7 (*)     Platelets 312      Neutrophils % 66.8      Immature Granulocytes %, Automated 0.4      Lymphocytes % 22.5      Monocytes % 8.2      Eosinophils % 1.6      Basophils % 0.5      Neutrophils Absolute 5.44      Immature Granulocytes Absolute, Automated 0.03      Lymphocytes Absolute 1.83      Monocytes Absolute 0.67      Eosinophils Absolute 0.13      Basophils Absolute 0.04     COMPREHENSIVE METABOLIC PANEL - Abnormal    Glucose 123 (*)     Sodium 135 (*)     Potassium 4.1      Chloride 100      Bicarbonate 26      Anion Gap 13      Urea Nitrogen 8      Creatinine 0.62      eGFR >90      Calcium 9.0      Albumin 3.9      Alkaline Phosphatase 102      Total Protein 6.7      AST 22      Bilirubin, Total 0.4      ALT 20     MAGNESIUM - Normal    Magnesium 1.85     LACTATE - Normal    Lactate 1.5      Narrative:     Venipuncture immediately after or during the administration of Metamizole may lead to falsely low results. Testing should be performed immediately  prior to Metamizole dosing.   B-TYPE NATRIURETIC PEPTIDE - Normal    BNP 15      Narrative:        <100 pg/mL - Heart failure unlikely  100-299 pg/mL - Intermediate probability of acute heart                  failure exacerbation. Correlate with clinical                  context and patient history.    >=300 pg/mL - Heart Failure likely. Correlate with clinical                  context and patient history.    BNP testing is performed using different testing methodology at [facility] than at other [facility]. Direct result comparisons should only be made within the same method.      LIPASE - Normal    Lipase 15      Narrative:     Venipuncture immediately after or during the administration of Metamizole may lead to falsely low results. Testing should be performed immediately prior to Metamizole dosing.   SARS-COV-2 AND INFLUENZA A/B PCR - Normal    Flu A Result Not Detected      Flu B  Result Not Detected      Coronavirus 2019, PCR Not Detected      Narrative:     This assay has received FDA Emergency Use Authorization (EUA) and  is only authorized for the duration of time that circumstances exist to justify the authorization of the emergency use of in vitro diagnostic tests for the detection of SARS-CoV-2 virus and/or diagnosis of COVID-19 infection under section 564(b)(1) of the Act, 21 U.S.C. 360bbb-3(b)(1). Testing for SARS-CoV-2 is only recommended for patients who meet current clinical and/or epidemiological criteria as defined by federal, state, or local public health directives. This assay is an in vitro diagnostic nucleic acid amplification test for the qualitative detection of SARS-CoV-2, Influenza A, and Influenza B from nasopharyngeal specimens and has been validated for use at [facility]. Negative results do not preclude COVID-19 infections or Influenza A/B infections, and should not be used as the sole basis for diagnosis, treatment, or other management decisions. If Influenza A/B and RSV PCR results are negative, testing for Parainfluenza virus, Adenovirus and Metapneumovirus is routinely performed for [facility] pediatric oncology and intensive care inpatients, and is available on other patients by placing an add-on request.    RSV PCR - Normal    RSV PCR Not Detected      Narrative:     This assay is an FDA-cleared, in vitro diagnostic nucleic acid amplification test for the detection of RSV from nasopharyngeal specimens, and has been validated for use at [facility]. Negative results do not preclude RSV infections, and should not be used as the sole basis for diagnosis, treatment, or other management decisions. If Influenza A/B and RSV PCR results are negative, testing for Parainfluenza virus, Adenovirus and Metapneumovirus is routinely performed for pediatric oncology and intensive care inpatients at [facility], and is available on other patients  by placing an add-on request.       SERIAL TROPONIN-INITIAL - Normal    Troponin I, High Sensitivity 4      Narrative:     Less than 99th percentile of normal range cutoff-  Female and children under 18 years old <14 ng/L; Male <21 ng/L: Negative  Repeat testing should be performed if clinically indicated.     Female and children under 18 years old 14-50 ng/L; Male 21-50 ng/L:  Consistent with possible cardiac damage and possible increased clinical   risk. Serial measurements may help to assess extent of myocardial damage.     >50 ng/L: Consistent with cardiac damage, increased clinical risk and  myocardial infarction. Serial measurements may help assess extent of   myocardial damage.      NOTE: Children less than 1 year old may have higher baseline troponin   levels and results should be interpreted in conjunction with the overall   clinical context.     NOTE: Troponin I testing is performed using a different   testing methodology at [facility] than at other   [facility]. Direct result comparisons should only   be made within the same method.   SERIAL TROPONIN, 1 HOUR - Normal    Troponin I, High Sensitivity 3      Narrative:     Less than 99th percentile of normal range cutoff-  Female and children under 18 years old <14 ng/L; Male <21 ng/L: Negative  Repeat testing should be performed if clinically indicated.     Female and children under 18 years old 14-50 ng/L; Male 21-50 ng/L:  Consistent with possible cardiac damage and possible increased clinical   risk. Serial measurements may help to assess extent of myocardial damage.     >50 ng/L: Consistent with cardiac damage, increased clinical risk and  myocardial infarction. Serial measurements may help assess extent of   myocardial damage.      NOTE: Children less than 1 year old may have higher baseline troponin   levels and results should be interpreted in conjunction with the overall   clinical context.     NOTE: Troponin I testing is  performed using a different   testing methodology at [facility] than at other   [facility]. Direct result comparisons should only   be made within the same method.   TROPONIN SERIES- (INITIAL, 1 HR)    Narrative:     The following orders were created for panel order Troponin I Series, High Sensitivity (0, 1 HR).  Procedure                               Abnormality         Status                     ---------                               -----------         ------                     Troponin I, High Sensiti...[[id number]]  Normal              Final result               Troponin, High Sensitivi...[[id number]]  Normal              Final result                 Please view results for these tests on the individual orders.        CT angio chest for pulmonary embolism   Final Result   No CT evidence of pulmonary embolism.   Emphysema.   Bibasilar and lingula left upper lobe infiltrates.   Signed by Natanael Friedman MD      Vascular US lower extremity venous duplex left   Final Result   No evidence for DVT within the left lower extremity.     Signed by Oseas Goodson MD      XR chest 1 view   Final Result   Bibasilar infiltrates unchanged from the prior exam.   Signed by Eliazar Griffin MD          Procedure  ECG 12 lead    Performed by: Hari Ledesma PA-C  Authorized by: Hari Ledesma PA-C    Interpretation:     Details:  My EKG interpretation  Rate:     ECG rate:  85    ECG rate assessment: normal    Rhythm:     Rhythm: sinus rhythm    ST segments:     ST segments:  Normal  T waves:     T waves: normal         Hari Ledesma PA-C  03/13/24 0805

## 2024-03-14 ENCOUNTER — APPOINTMENT (OUTPATIENT)
Dept: ORTHOPEDIC SURGERY | Facility: CLINIC | Age: 79
End: 2024-03-14
Payer: MEDICARE

## 2024-03-14 LAB
ATRIAL RATE: 85 BPM
ATRIAL RATE: 87 BPM
P AXIS: 100 DEGREES
P AXIS: 50 DEGREES
P OFFSET: 176 MS
P OFFSET: 195 MS
P ONSET: 144 MS
P ONSET: 151 MS
PR INTERVAL: 150 MS
PR INTERVAL: 164 MS
Q ONSET: 226 MS
Q ONSET: 226 MS
QRS COUNT: 14 BEATS
QRS COUNT: 14 BEATS
QRS DURATION: 64 MS
QRS DURATION: 68 MS
QT INTERVAL: 374 MS
QT INTERVAL: 386 MS
QTC CALCULATION(BAZETT): 450 MS
QTC CALCULATION(BAZETT): 459 MS
QTC FREDERICIA: 423 MS
QTC FREDERICIA: 433 MS
R AXIS: -1 DEGREES
R AXIS: -3 DEGREES
T AXIS: 14 DEGREES
T AXIS: 22 DEGREES
T OFFSET: 413 MS
T OFFSET: 419 MS
VENTRICULAR RATE: 85 BPM
VENTRICULAR RATE: 87 BPM

## 2024-03-15 ENCOUNTER — EVALUATION (OUTPATIENT)
Dept: PHYSICAL THERAPY | Facility: CLINIC | Age: 79
End: 2024-03-15
Payer: MEDICARE

## 2024-03-15 ENCOUNTER — OFFICE VISIT (OUTPATIENT)
Dept: ORTHOPEDIC SURGERY | Facility: CLINIC | Age: 79
End: 2024-03-15
Payer: MEDICARE

## 2024-03-15 DIAGNOSIS — M17.11 UNILATERAL PRIMARY OSTEOARTHRITIS, RIGHT KNEE: Primary | ICD-10-CM

## 2024-03-15 DIAGNOSIS — M17.11 PRIMARY OSTEOARTHRITIS OF RIGHT KNEE: Primary | ICD-10-CM

## 2024-03-15 PROCEDURE — 97161 PT EVAL LOW COMPLEX 20 MIN: CPT | Mod: GP

## 2024-03-15 PROCEDURE — 1159F MED LIST DOCD IN RCRD: CPT | Performed by: ORTHOPAEDIC SURGERY

## 2024-03-15 PROCEDURE — 97535 SELF CARE MNGMENT TRAINING: CPT | Mod: GP

## 2024-03-15 PROCEDURE — 99024 POSTOP FOLLOW-UP VISIT: CPT | Performed by: ORTHOPAEDIC SURGERY

## 2024-03-15 PROCEDURE — 1036F TOBACCO NON-USER: CPT | Performed by: ORTHOPAEDIC SURGERY

## 2024-03-15 PROCEDURE — 1160F RVW MEDS BY RX/DR IN RCRD: CPT | Performed by: ORTHOPAEDIC SURGERY

## 2024-03-15 PROCEDURE — 1157F ADVNC CARE PLAN IN RCRD: CPT | Performed by: ORTHOPAEDIC SURGERY

## 2024-03-15 ASSESSMENT — PATIENT HEALTH QUESTIONNAIRE - PHQ9
1. LITTLE INTEREST OR PLEASURE IN DOING THINGS: SEVERAL DAYS
SUM OF ALL RESPONSES TO PHQ9 QUESTIONS 1 AND 2: 2
2. FEELING DOWN, DEPRESSED OR HOPELESS: SEVERAL DAYS
10. IF YOU CHECKED OFF ANY PROBLEMS, HOW DIFFICULT HAVE THESE PROBLEMS MADE IT FOR YOU TO DO YOUR WORK, TAKE CARE OF THINGS AT HOME, OR GET ALONG WITH OTHER PEOPLE: SOMEWHAT DIFFICULT

## 2024-03-15 ASSESSMENT — ENCOUNTER SYMPTOMS
LOSS OF SENSATION IN FEET: 0
OCCASIONAL FEELINGS OF UNSTEADINESS: 0

## 2024-03-15 NOTE — PROGRESS NOTES
Physical Therapy      Physical Therapy Evaluation and Treatment      Patient Name: Rae Lundy  MRN: [id number]  Today's Date: 3/15/2024  Time Calculation  Start Time: 1007  Stop Time: 1053  Time Calculation (min): 46 min      Assessment:  Patient demonstrates independent knowledge and understanding of: anatomy, surgical procedure, post-operative exercise programs, signs and symptoms of post-operative infection and post-operative management of pain.    Patient demonstrates good safety awareness and modified independence with ambulation: 4 point gait pattern with FWW with WBAT weight bearing on R LE, on level surfaces and on stairs    Handouts Given: surgical overview booklet, post-operative exercises, gait training instructions    Plan of Care: Patient will be placed on hold for therapy until after completion of surgical procedure. Upon return to therapy, patient's status will be re-assessed and goal updated    Patient plans for post op follow up at [facility]     Current Problem:   1. Unilateral primary osteoarthritis, right knee            Insurance: [employer]  Visits Approved: BOA  Visit Number: 1  Copay: $40             Subjective    General:  General Comment: R TKA 3/22/24, Pt goes by Rafael Peace. is a 79 year old F , who presents for a physical therapy preoperative evaluation for a R DANE TKA that is scheduled to be performed on 3/22/24 by Dr. Ubaldo Stone. Pt lives alone. Pt has ramp steps to enter home, and 0 steps bedroom. Pt denies persistent night pain. Pt denies any numbness and tingling in the R lower extremity.     Patient has 3/10 pain currently, with 3/10 at worst and 0/10 at best.     Relieving factors: rest    Exacerbating factors: rising from sitting    Objective:  Observation:  Pt arrives in a WC accompanied by her son. Pt able to walk short distance with SPC. Valgus deformity noted at the R knee    Palpation:  TTP at the R knee joint line    LE Dermatomes +Myotomes  WNL B/L    Knee ROM  (degrees)  WNL B/L with pain in end range R knee flex and EXT    Knee MMT         R      L  Flexion     _4/5_  _4+/5_  Extension_4-/5_  _4/5_  *= painful

## 2024-03-15 NOTE — PROGRESS NOTES
Subjective    Patient ID: Rae    Chief Complaint:   Chief Complaint   Patient presents with    Right Knee - Preop Visit     RT DANE TKR 3/22/24 @  Rick     This patient has pain in the knee that is worsening.  The pain increases with activity and with weightbearing, and interferes with daily activities.  There is pain with range of motion, limited range of motion, crepitus and swelling.  The x-ray demonstrates joint space narrowing, osteophyte formation, and subchondral sclerosis.  The symptoms have worsened in spite of extensive medical treatment, therapy, and external support over at least the past 3 months.     This is the preop visit to discuss the risks and benefits of the total knee replacement surgery.  These risks were fully explained to the patient.  With this, and any surgery, infection is a risk, this is usually 1 to 2%, even higher in diabetics, persons with rheumatoid arthritis, previous surgeries, on oral steroid medications, and obesity.  All of these issues were properly addressed.  We always assure all sterile techniques will be followed.  Patient will also need to be on antibiotics for the next 2 years for any minor surgery, even dental work.  For high risk patients, this will be for lifetime.  Severe infections may require removal of the prosthesis.     It was also explained to the patient that there will be some minor blood loss during the procedure and a blood transfusion may be recommended if medically necessary.  It is also noted that with knee surgery a tourniquet is applied to the lower extremity to limit blood loss during the procedure.     Pulmonary embolism and blood clots are also discussed with the patient.  We discussed that these can be fatal complications to the surgery.  It is explained to the patient that the use of thromboembolic stockings, foot pumps, incentive spirometer's, early mobility, and the use of blood thinners for period of time is the standard of care for  prevention of blood clots.     Patient's preoperative range of motion is 0-110 degrees.  It is explained to the patient that this type of surgery is to decrease arthritis pain and sometimes stiffness may occur.  It is important that the patient go to physical therapy postoperatively.  It is also stated that occasionally we will have to manipulate the knee if pain and stiffness persists.     Loosening and wear of the prosthesis are also discussed.  The prosthesis normally last between 12 and 15 years.  Revisions may be more complicated and with higher risk.     Fractures, though rare, may also occur intraoperatively.  These fractures may be to the tibia or to the femur.  There may be nerve or arterial injuries as well and these are discussed in detail.     Lastly, the benefits of spinal anesthesia were explained to the patient.     All of the patient's questions and concerns were answered.     This note was prepared using voice recognition software.  The details of this note are correct and have been reviewed, and corrected to the best of my ability.  Some grammatical areas may persist related to the Dragon software     Lamine Culver PA-C     [phone]

## 2024-03-21 ENCOUNTER — ANESTHESIA EVENT (OUTPATIENT)
Dept: OPERATING ROOM | Facility: HOSPITAL | Age: 79
End: 2024-03-21
Payer: MEDICARE

## 2024-03-22 ENCOUNTER — HOSPITAL ENCOUNTER (OUTPATIENT)
Facility: HOSPITAL | Age: 79
Discharge: NURSING, REHAB OR LONG-TERM CARE | End: 2024-03-24
Attending: ORTHOPAEDIC SURGERY | Admitting: ORTHOPAEDIC SURGERY
Payer: MEDICARE

## 2024-03-22 ENCOUNTER — APPOINTMENT (OUTPATIENT)
Dept: RADIOLOGY | Facility: HOSPITAL | Age: 79
End: 2024-03-22
Payer: MEDICARE

## 2024-03-22 ENCOUNTER — ANESTHESIA (OUTPATIENT)
Dept: OPERATING ROOM | Facility: HOSPITAL | Age: 79
End: 2024-03-22
Payer: MEDICARE

## 2024-03-22 DIAGNOSIS — J44.1 COPD EXACERBATION (MULTI): ICD-10-CM

## 2024-03-22 DIAGNOSIS — Z45.09 ENCOUNTER FOR LOOP RECORDER AT END OF BATTERY LIFE: ICD-10-CM

## 2024-03-22 DIAGNOSIS — Z95.818 STATUS POST PLACEMENT OF IMPLANTABLE LOOP RECORDER: ICD-10-CM

## 2024-03-22 DIAGNOSIS — M17.11 UNILATERAL PRIMARY OSTEOARTHRITIS, RIGHT KNEE: ICD-10-CM

## 2024-03-22 DIAGNOSIS — R09.02 HYPOXIA: ICD-10-CM

## 2024-03-22 DIAGNOSIS — I49.5 TACHYCARDIA-BRADYCARDIA SYNDROME (MULTI): ICD-10-CM

## 2024-03-22 DIAGNOSIS — Z96.651 S/P TOTAL KNEE REPLACEMENT, RIGHT: ICD-10-CM

## 2024-03-22 DIAGNOSIS — I48.0 PAROXYSMAL ATRIAL FIBRILLATION (MULTI): Primary | ICD-10-CM

## 2024-03-22 PROBLEM — Z99.81 HISTORY OF HOME OXYGEN THERAPY: Status: ACTIVE | Noted: 2024-03-22

## 2024-03-22 PROCEDURE — 2500000004 HC RX 250 GENERAL PHARMACY W/ HCPCS (ALT 636 FOR OP/ED): Performed by: ORTHOPAEDIC SURGERY

## 2024-03-22 PROCEDURE — 73560 X-RAY EXAM OF KNEE 1 OR 2: CPT | Mod: RIGHT SIDE | Performed by: RADIOLOGY

## 2024-03-22 PROCEDURE — 7100000011 HC EXTENDED STAY RECOVERY HOURLY - NURSING UNIT

## 2024-03-22 PROCEDURE — 2500000005 HC RX 250 GENERAL PHARMACY W/O HCPCS: Performed by: ORTHOPAEDIC SURGERY

## 2024-03-22 PROCEDURE — 3600000018 HC OR TIME - INITIAL BASE CHARGE - PROCEDURE LEVEL SIX: Performed by: ORTHOPAEDIC SURGERY

## 2024-03-22 PROCEDURE — 2720000007 HC OR 272 NO HCPCS: Performed by: ORTHOPAEDIC SURGERY

## 2024-03-22 PROCEDURE — 3700000002 HC GENERAL ANESTHESIA TIME - EACH INCREMENTAL 1 MINUTE: Performed by: ORTHOPAEDIC SURGERY

## 2024-03-22 PROCEDURE — 2500000005 HC RX 250 GENERAL PHARMACY W/O HCPCS: Performed by: NURSE ANESTHETIST, CERTIFIED REGISTERED

## 2024-03-22 PROCEDURE — 2500000004 HC RX 250 GENERAL PHARMACY W/ HCPCS (ALT 636 FOR OP/ED): Performed by: STUDENT IN AN ORGANIZED HEALTH CARE EDUCATION/TRAINING PROGRAM

## 2024-03-22 PROCEDURE — 3600000017 HC OR TIME - EACH INCREMENTAL 1 MINUTE - PROCEDURE LEVEL SIX: Performed by: ORTHOPAEDIC SURGERY

## 2024-03-22 PROCEDURE — G0378 HOSPITAL OBSERVATION PER HR: HCPCS

## 2024-03-22 PROCEDURE — A4217 STERILE WATER/SALINE, 500 ML: HCPCS | Performed by: ORTHOPAEDIC SURGERY

## 2024-03-22 PROCEDURE — 27447 TOTAL KNEE ARTHROPLASTY: CPT | Performed by: ORTHOPAEDIC SURGERY

## 2024-03-22 PROCEDURE — 2780000003 HC OR 278 NO HCPCS: Performed by: ORTHOPAEDIC SURGERY

## 2024-03-22 PROCEDURE — 20985 CPTR-ASST DIR MS PX: CPT | Performed by: ORTHOPAEDIC SURGERY

## 2024-03-22 PROCEDURE — 2500000001 HC RX 250 WO HCPCS SELF ADMINISTERED DRUGS (ALT 637 FOR MEDICARE OP): Performed by: ORTHOPAEDIC SURGERY

## 2024-03-22 PROCEDURE — 2500000004 HC RX 250 GENERAL PHARMACY W/ HCPCS (ALT 636 FOR OP/ED): Performed by: NURSE ANESTHETIST, CERTIFIED REGISTERED

## 2024-03-22 PROCEDURE — C1776 JOINT DEVICE (IMPLANTABLE): HCPCS | Performed by: ORTHOPAEDIC SURGERY

## 2024-03-22 PROCEDURE — 2500000002 HC RX 250 W HCPCS SELF ADMINISTERED DRUGS (ALT 637 FOR MEDICARE OP, ALT 636 FOR OP/ED): Performed by: ORTHOPAEDIC SURGERY

## 2024-03-22 PROCEDURE — 7100000002 HC RECOVERY ROOM TIME - EACH INCREMENTAL 1 MINUTE: Performed by: ORTHOPAEDIC SURGERY

## 2024-03-22 PROCEDURE — 27447 TOTAL KNEE ARTHROPLASTY: CPT | Performed by: PHYSICIAN ASSISTANT

## 2024-03-22 PROCEDURE — C1713 ANCHOR/SCREW BN/BN,TIS/BN: HCPCS | Performed by: ORTHOPAEDIC SURGERY

## 2024-03-22 PROCEDURE — 73560 X-RAY EXAM OF KNEE 1 OR 2: CPT | Mod: RT

## 2024-03-22 PROCEDURE — 7100000001 HC RECOVERY ROOM TIME - INITIAL BASE CHARGE: Performed by: ORTHOPAEDIC SURGERY

## 2024-03-22 PROCEDURE — 3700000001 HC GENERAL ANESTHESIA TIME - INITIAL BASE CHARGE: Performed by: ORTHOPAEDIC SURGERY

## 2024-03-22 DEVICE — PATELLA
Type: IMPLANTABLE DEVICE | Site: KNEE | Status: FUNCTIONAL
Brand: TRIATHLON

## 2024-03-22 DEVICE — SIMPLEX® HV IS A FAST-SETTING ACRYLIC RESIN FOR USE IN BONE SURGERY. MIXING THE TWO SEPARATE STERILE COMPONENTS PRODUCES A DUCTILE BONE CEMENT WHICH, AFTER HARDENING, FIXES THE IMPLANT AND TRANSFERS STRESSES PRODUCED DURING MOVEMENT EVENLY TO THE BONE. SIMPLEX® HV CEMENT POWDER ALSO CONTAINS INSOLUBLE ZIRCONIUM DIOXIDE AS AN X-RAY CONTRAST MEDIUM. SIMPLEX® HV DOES NOT EMIT A SIGNAL AND DOES NOT POSE A SAFETY RISK IN A MAGNETIC RESONANCE ENVIRONMENT.
Type: IMPLANTABLE DEVICE | Site: KNEE | Status: FUNCTIONAL
Brand: SIMPLEX HV

## 2024-03-22 DEVICE — CRUCIATE RETAINING FEMORAL
Type: IMPLANTABLE DEVICE | Site: KNEE | Status: FUNCTIONAL
Brand: TRIATHLON

## 2024-03-22 DEVICE — TIBIAL COMPONENT
Type: IMPLANTABLE DEVICE | Site: KNEE | Status: FUNCTIONAL
Brand: TRIATHLON

## 2024-03-22 RX ORDER — PHENYLEPHRINE HCL IN 0.9% NACL 1 MG/10 ML
SYRINGE (ML) INTRAVENOUS AS NEEDED
Status: DISCONTINUED | OUTPATIENT
Start: 2024-03-22 | End: 2024-03-22

## 2024-03-22 RX ORDER — CEFAZOLIN SODIUM 1 G/50ML
1 SOLUTION INTRAVENOUS ONCE
Status: DISCONTINUED | OUTPATIENT
Start: 2024-03-22 | End: 2024-03-22

## 2024-03-22 RX ORDER — CYCLOBENZAPRINE HCL 10 MG
10 TABLET ORAL 3 TIMES DAILY PRN
Status: DISCONTINUED | OUTPATIENT
Start: 2024-03-22 | End: 2024-03-24 | Stop reason: HOSPADM

## 2024-03-22 RX ORDER — LIDOCAINE HYDROCHLORIDE 20 MG/ML
INJECTION, SOLUTION EPIDURAL; INFILTRATION; INTRACAUDAL; PERINEURAL AS NEEDED
Status: DISCONTINUED | OUTPATIENT
Start: 2024-03-22 | End: 2024-03-22

## 2024-03-22 RX ORDER — PROCHLORPERAZINE MALEATE 5 MG
10 TABLET ORAL EVERY 6 HOURS PRN
Status: DISCONTINUED | OUTPATIENT
Start: 2024-03-22 | End: 2024-03-24 | Stop reason: HOSPADM

## 2024-03-22 RX ORDER — ASPIRIN 81 MG/1
81 TABLET ORAL 2 TIMES DAILY
Status: DISCONTINUED | OUTPATIENT
Start: 2024-03-22 | End: 2024-03-24 | Stop reason: HOSPADM

## 2024-03-22 RX ORDER — SODIUM CHLORIDE, SODIUM LACTATE, POTASSIUM CHLORIDE, CALCIUM CHLORIDE 600; 310; 30; 20 MG/100ML; MG/100ML; MG/100ML; MG/100ML
50 INJECTION, SOLUTION INTRAVENOUS CONTINUOUS
Status: DISCONTINUED | OUTPATIENT
Start: 2024-03-22 | End: 2024-03-22

## 2024-03-22 RX ORDER — CEFAZOLIN SODIUM 2 G/100ML
2 INJECTION, SOLUTION INTRAVENOUS ONCE
Status: COMPLETED | OUTPATIENT
Start: 2024-03-22 | End: 2024-03-22

## 2024-03-22 RX ORDER — MIDAZOLAM HYDROCHLORIDE 1 MG/ML
INJECTION, SOLUTION INTRAMUSCULAR; INTRAVENOUS AS NEEDED
Status: DISCONTINUED | OUTPATIENT
Start: 2024-03-22 | End: 2024-03-22

## 2024-03-22 RX ORDER — TRANEXAMIC ACID 650 MG/1
1950 TABLET ORAL ONCE
Status: COMPLETED | OUTPATIENT
Start: 2024-03-22 | End: 2024-03-22

## 2024-03-22 RX ORDER — ACETAMINOPHEN 325 MG/1
975 TABLET ORAL ONCE
Status: COMPLETED | OUTPATIENT
Start: 2024-03-22 | End: 2024-03-22

## 2024-03-22 RX ORDER — CEFAZOLIN SODIUM IN 0.9 % NACL 3 G/100 ML
2 INTRAVENOUS SOLUTION, PIGGYBACK (ML) INTRAVENOUS EVERY 8 HOURS
Status: DISCONTINUED | OUTPATIENT
Start: 2024-03-22 | End: 2024-03-22 | Stop reason: ENTERED-IN-ERROR

## 2024-03-22 RX ORDER — KETOROLAC TROMETHAMINE 30 MG/ML
7.5 INJECTION, SOLUTION INTRAMUSCULAR; INTRAVENOUS EVERY 6 HOURS
Status: DISPENSED | OUTPATIENT
Start: 2024-03-22 | End: 2024-03-23

## 2024-03-22 RX ORDER — CEFAZOLIN SODIUM 2 G/100ML
2 INJECTION, SOLUTION INTRAVENOUS EVERY 8 HOURS
Status: COMPLETED | OUTPATIENT
Start: 2024-03-22 | End: 2024-03-23

## 2024-03-22 RX ORDER — DIPHENHYDRAMINE HCL 12.5MG/5ML
12.5 LIQUID (ML) ORAL EVERY 6 HOURS PRN
Status: DISCONTINUED | OUTPATIENT
Start: 2024-03-22 | End: 2024-03-24 | Stop reason: HOSPADM

## 2024-03-22 RX ORDER — PROCHLORPERAZINE 25 MG/1
25 SUPPOSITORY RECTAL EVERY 12 HOURS PRN
Status: DISCONTINUED | OUTPATIENT
Start: 2024-03-22 | End: 2024-03-24 | Stop reason: HOSPADM

## 2024-03-22 RX ORDER — ONDANSETRON HYDROCHLORIDE 2 MG/ML
INJECTION, SOLUTION INTRAVENOUS AS NEEDED
Status: DISCONTINUED | OUTPATIENT
Start: 2024-03-22 | End: 2024-03-22

## 2024-03-22 RX ORDER — SODIUM CHLORIDE 0.9 G/100ML
IRRIGANT IRRIGATION AS NEEDED
Status: DISCONTINUED | OUTPATIENT
Start: 2024-03-22 | End: 2024-03-22 | Stop reason: HOSPADM

## 2024-03-22 RX ORDER — SODIUM CHLORIDE, SODIUM LACTATE, POTASSIUM CHLORIDE, CALCIUM CHLORIDE 600; 310; 30; 20 MG/100ML; MG/100ML; MG/100ML; MG/100ML
50 INJECTION, SOLUTION INTRAVENOUS CONTINUOUS
Status: ACTIVE | OUTPATIENT
Start: 2024-03-22 | End: 2024-03-23

## 2024-03-22 RX ORDER — ROPIVACAINE HYDROCHLORIDE 2 MG/ML
20 INJECTION, SOLUTION EPIDURAL; INFILTRATION; PERINEURAL ONCE
Status: COMPLETED | OUTPATIENT
Start: 2024-03-22 | End: 2024-03-22

## 2024-03-22 RX ORDER — ACETAMINOPHEN 325 MG/1
650 TABLET ORAL EVERY 6 HOURS SCHEDULED
Status: DISCONTINUED | OUTPATIENT
Start: 2024-03-22 | End: 2024-03-24 | Stop reason: HOSPADM

## 2024-03-22 RX ORDER — ROPIVACAINE HYDROCHLORIDE 5 MG/ML
20 INJECTION, SOLUTION EPIDURAL; INFILTRATION; PERINEURAL ONCE
Status: COMPLETED | OUTPATIENT
Start: 2024-03-22 | End: 2024-03-22

## 2024-03-22 RX ORDER — TRANEXAMIC ACID 650 MG/1
1950 TABLET ORAL ONCE
Status: COMPLETED | OUTPATIENT
Start: 2024-03-23 | End: 2024-03-23

## 2024-03-22 RX ORDER — DOCUSATE SODIUM 100 MG/1
100 CAPSULE, LIQUID FILLED ORAL 2 TIMES DAILY
Status: DISCONTINUED | OUTPATIENT
Start: 2024-03-22 | End: 2024-03-24 | Stop reason: HOSPADM

## 2024-03-22 RX ORDER — ONDANSETRON 4 MG/1
4 TABLET, ORALLY DISINTEGRATING ORAL EVERY 8 HOURS PRN
Status: DISCONTINUED | OUTPATIENT
Start: 2024-03-22 | End: 2024-03-24 | Stop reason: HOSPADM

## 2024-03-22 RX ORDER — OXYCODONE HYDROCHLORIDE 5 MG/1
10 TABLET ORAL EVERY 4 HOURS PRN
Status: DISCONTINUED | OUTPATIENT
Start: 2024-03-22 | End: 2024-03-24 | Stop reason: HOSPADM

## 2024-03-22 RX ORDER — GABAPENTIN 300 MG/1
600 CAPSULE ORAL ONCE
Status: COMPLETED | OUTPATIENT
Start: 2024-03-22 | End: 2024-03-22

## 2024-03-22 RX ORDER — ROPIVACAINE/EPI/CLONIDINE/KET 2.46-0.005
SYRINGE (ML) INJECTION AS NEEDED
Status: DISCONTINUED | OUTPATIENT
Start: 2024-03-22 | End: 2024-03-22 | Stop reason: HOSPADM

## 2024-03-22 RX ORDER — MUPIROCIN 20 MG/G
1 OINTMENT TOPICAL ONCE
Status: DISCONTINUED | OUTPATIENT
Start: 2024-03-22 | End: 2024-03-22

## 2024-03-22 RX ORDER — CELECOXIB 200 MG/1
200 CAPSULE ORAL ONCE
Status: COMPLETED | OUTPATIENT
Start: 2024-03-22 | End: 2024-03-22

## 2024-03-22 RX ORDER — SODIUM CHLORIDE 9 MG/ML
100 INJECTION, SOLUTION INTRAVENOUS CONTINUOUS
Status: DISCONTINUED | OUTPATIENT
Start: 2024-03-22 | End: 2024-03-22

## 2024-03-22 RX ORDER — BISACODYL 5 MG
10 TABLET, DELAYED RELEASE (ENTERIC COATED) ORAL DAILY PRN
Status: DISCONTINUED | OUTPATIENT
Start: 2024-03-22 | End: 2024-03-24 | Stop reason: HOSPADM

## 2024-03-22 RX ORDER — OXYCODONE HYDROCHLORIDE 5 MG/1
5 TABLET ORAL EVERY 4 HOURS PRN
Status: DISCONTINUED | OUTPATIENT
Start: 2024-03-22 | End: 2024-03-24 | Stop reason: HOSPADM

## 2024-03-22 RX ORDER — PROPOFOL 10 MG/ML
INJECTION, EMULSION INTRAVENOUS AS NEEDED
Status: DISCONTINUED | OUTPATIENT
Start: 2024-03-22 | End: 2024-03-22

## 2024-03-22 RX ORDER — MORPHINE SULFATE 2 MG/ML
2 INJECTION, SOLUTION INTRAMUSCULAR; INTRAVENOUS EVERY 2 HOUR PRN
Status: DISCONTINUED | OUTPATIENT
Start: 2024-03-22 | End: 2024-03-24 | Stop reason: HOSPADM

## 2024-03-22 RX ORDER — BUPIVACAINE HYDROCHLORIDE 7.5 MG/ML
INJECTION, SOLUTION INTRASPINAL AS NEEDED
Status: DISCONTINUED | OUTPATIENT
Start: 2024-03-22 | End: 2024-03-22

## 2024-03-22 RX ORDER — ONDANSETRON HYDROCHLORIDE 2 MG/ML
4 INJECTION, SOLUTION INTRAVENOUS EVERY 8 HOURS PRN
Status: DISCONTINUED | OUTPATIENT
Start: 2024-03-22 | End: 2024-03-24 | Stop reason: HOSPADM

## 2024-03-22 RX ORDER — PROCHLORPERAZINE EDISYLATE 5 MG/ML
10 INJECTION INTRAMUSCULAR; INTRAVENOUS EVERY 6 HOURS PRN
Status: DISCONTINUED | OUTPATIENT
Start: 2024-03-22 | End: 2024-03-24 | Stop reason: HOSPADM

## 2024-03-22 RX ORDER — CHLORHEXIDINE GLUCONATE 40 MG/ML
SOLUTION TOPICAL ONCE
Status: DISCONTINUED | OUTPATIENT
Start: 2024-03-22 | End: 2024-03-22

## 2024-03-22 RX ORDER — PROPOFOL 10 MG/ML
INJECTION, EMULSION INTRAVENOUS CONTINUOUS PRN
Status: DISCONTINUED | OUTPATIENT
Start: 2024-03-22 | End: 2024-03-22

## 2024-03-22 RX ORDER — NORETHINDRONE AND ETHINYL ESTRADIOL 0.5-0.035
KIT ORAL AS NEEDED
Status: DISCONTINUED | OUTPATIENT
Start: 2024-03-22 | End: 2024-03-22

## 2024-03-22 RX ADMIN — POVIDONE-IODINE 1 APPLICATION: 5 SOLUTION TOPICAL at 12:04

## 2024-03-22 RX ADMIN — KETOROLAC TROMETHAMINE 7.5 MG: 30 INJECTION, SOLUTION INTRAMUSCULAR at 18:39

## 2024-03-22 RX ADMIN — BUPIVACAINE HYDROCHLORIDE IN DEXTROSE 1.6 ML: 7.5 INJECTION, SOLUTION SUBARACHNOID at 14:05

## 2024-03-22 RX ADMIN — ASPIRIN 81 MG: 81 TABLET, COATED ORAL at 21:36

## 2024-03-22 RX ADMIN — LIDOCAINE HYDROCHLORIDE 60 MG: 20 INJECTION, SOLUTION EPIDURAL; INFILTRATION; INTRACAUDAL; PERINEURAL at 14:05

## 2024-03-22 RX ADMIN — PROPOFOL 50 MG: 10 INJECTION, EMULSION INTRAVENOUS at 15:09

## 2024-03-22 RX ADMIN — EPHEDRINE SULFATE 5 MG: 50 INJECTION, SOLUTION INTRAVENOUS at 14:46

## 2024-03-22 RX ADMIN — ROPIVACAINE HYDROCHLORIDE 100 MG: 5 INJECTION EPIDURAL; INFILTRATION; PERINEURAL at 13:25

## 2024-03-22 RX ADMIN — PROPOFOL 50 MG: 10 INJECTION, EMULSION INTRAVENOUS at 15:05

## 2024-03-22 RX ADMIN — ACETAMINOPHEN 975 MG: 325 TABLET ORAL at 12:07

## 2024-03-22 RX ADMIN — ROPIVACAINE HYDROCHLORIDE 40 MG: 2 INJECTION, SOLUTION EPIDURAL; INFILTRATION at 13:25

## 2024-03-22 RX ADMIN — CEFAZOLIN SODIUM 2 G: 2 INJECTION, SOLUTION INTRAVENOUS at 21:36

## 2024-03-22 RX ADMIN — CELECOXIB 200 MG: 200 CAPSULE ORAL at 12:05

## 2024-03-22 RX ADMIN — SODIUM CHLORIDE, SODIUM LACTATE, POTASSIUM CHLORIDE, AND CALCIUM CHLORIDE 50 ML/HR: 600; 310; 30; 20 INJECTION, SOLUTION INTRAVENOUS at 12:04

## 2024-03-22 RX ADMIN — ACETAMINOPHEN 650 MG: 325 TABLET ORAL at 18:39

## 2024-03-22 RX ADMIN — PROPOFOL 50 MG: 10 INJECTION, EMULSION INTRAVENOUS at 14:05

## 2024-03-22 RX ADMIN — MIDAZOLAM 1 MG: 1 INJECTION INTRAMUSCULAR; INTRAVENOUS at 13:55

## 2024-03-22 RX ADMIN — PROPOFOL 75 MCG/KG/MIN: 10 INJECTION, EMULSION INTRAVENOUS at 14:05

## 2024-03-22 RX ADMIN — TRANEXAMIC ACID 1950 MG: 650 TABLET ORAL at 21:36

## 2024-03-22 RX ADMIN — CEFAZOLIN SODIUM 2 G: 2 INJECTION, SOLUTION INTRAVENOUS at 14:06

## 2024-03-22 RX ADMIN — Medication 100 MCG: at 14:22

## 2024-03-22 RX ADMIN — TRANEXAMIC ACID 1950 MG: 650 TABLET ORAL at 12:06

## 2024-03-22 RX ADMIN — GABAPENTIN 600 MG: 300 CAPSULE ORAL at 12:07

## 2024-03-22 RX ADMIN — SODIUM CHLORIDE, SODIUM LACTATE, POTASSIUM CHLORIDE, AND CALCIUM CHLORIDE: 600; 310; 30; 20 INJECTION, SOLUTION INTRAVENOUS at 14:31

## 2024-03-22 RX ADMIN — ONDANSETRON 4 MG: 2 INJECTION, SOLUTION INTRAMUSCULAR; INTRAVENOUS at 14:05

## 2024-03-22 RX ADMIN — Medication 100 MCG: at 14:31

## 2024-03-22 RX ADMIN — MIDAZOLAM 1 MG: 1 INJECTION INTRAMUSCULAR; INTRAVENOUS at 13:20

## 2024-03-22 SDOH — SOCIAL STABILITY: SOCIAL INSECURITY: HAS ANYONE EVER THREATENED TO HURT YOUR FAMILY OR YOUR PETS?: NO

## 2024-03-22 SDOH — SOCIAL STABILITY: SOCIAL INSECURITY: DO YOU FEEL ANYONE HAS EXPLOITED OR TAKEN ADVANTAGE OF YOU FINANCIALLY OR OF YOUR PERSONAL PROPERTY?: NO

## 2024-03-22 SDOH — SOCIAL STABILITY: SOCIAL INSECURITY: ARE YOU OR HAVE YOU BEEN THREATENED OR ABUSED PHYSICALLY, EMOTIONALLY, OR SEXUALLY BY ANYONE?: NO

## 2024-03-22 SDOH — SOCIAL STABILITY: SOCIAL INSECURITY: WERE YOU ABLE TO COMPLETE ALL THE BEHAVIORAL HEALTH SCREENINGS?: YES

## 2024-03-22 SDOH — SOCIAL STABILITY: SOCIAL INSECURITY: DOES ANYONE TRY TO KEEP YOU FROM HAVING/CONTACTING OTHER FRIENDS OR DOING THINGS OUTSIDE YOUR HOME?: NO

## 2024-03-22 SDOH — HEALTH STABILITY: MENTAL HEALTH: CURRENT SMOKER: 0

## 2024-03-22 SDOH — SOCIAL STABILITY: SOCIAL INSECURITY: ARE THERE ANY APPARENT SIGNS OF INJURIES/BEHAVIORS THAT COULD BE RELATED TO ABUSE/NEGLECT?: NO

## 2024-03-22 SDOH — SOCIAL STABILITY: SOCIAL INSECURITY: HAVE YOU HAD THOUGHTS OF HARMING ANYONE ELSE?: NO

## 2024-03-22 SDOH — SOCIAL STABILITY: SOCIAL INSECURITY: ABUSE: ADULT

## 2024-03-22 SDOH — SOCIAL STABILITY: SOCIAL INSECURITY: DO YOU FEEL UNSAFE GOING BACK TO THE PLACE WHERE YOU ARE LIVING?: NO

## 2024-03-22 ASSESSMENT — ACTIVITIES OF DAILY LIVING (ADL)
TOILETING: NEEDS ASSISTANCE
ASSISTIVE_DEVICE: CANE;WALKER
FEEDING YOURSELF: INDEPENDENT
LACK_OF_TRANSPORTATION: NO
ASSISTIVE_DEVICE: CANE;WALKER
PATIENT'S MEMORY ADEQUATE TO SAFELY COMPLETE DAILY ACTIVITIES?: YES
HEARING - LEFT EAR: FUNCTIONAL
LACK_OF_TRANSPORTATION: NO
JUDGMENT_ADEQUATE_SAFELY_COMPLETE_DAILY_ACTIVITIES: YES
JUDGMENT_ADEQUATE_SAFELY_COMPLETE_DAILY_ACTIVITIES: YES
HEARING - RIGHT EAR: FUNCTIONAL
ADEQUATE_TO_COMPLETE_ADL: YES
BATHING: NEEDS ASSISTANCE
GROOMING: INDEPENDENT
DRESSING YOURSELF: INDEPENDENT
BATHING: NEEDS ASSISTANCE
WALKS IN HOME: NEEDS ASSISTANCE
FEEDING YOURSELF: INDEPENDENT
PATIENT'S MEMORY ADEQUATE TO SAFELY COMPLETE DAILY ACTIVITIES?: YES
TOILETING: NEEDS ASSISTANCE
ADEQUATE_TO_COMPLETE_ADL: YES
GROOMING: INDEPENDENT
HEARING - RIGHT EAR: FUNCTIONAL
HEARING - LEFT EAR: FUNCTIONAL
WALKS IN HOME: NEEDS ASSISTANCE

## 2024-03-22 ASSESSMENT — LIFESTYLE VARIABLES
HOW OFTEN DO YOU HAVE A DRINK CONTAINING ALCOHOL: NEVER
SKIP TO QUESTIONS 9-10: 1
AUDIT-C TOTAL SCORE: 0
AUDIT-C TOTAL SCORE: 0
HOW MANY STANDARD DRINKS CONTAINING ALCOHOL DO YOU HAVE ON A TYPICAL DAY: PATIENT DOES NOT DRINK
HOW OFTEN DO YOU HAVE 6 OR MORE DRINKS ON ONE OCCASION: NEVER

## 2024-03-22 ASSESSMENT — COGNITIVE AND FUNCTIONAL STATUS - GENERAL
DRESSING REGULAR UPPER BODY CLOTHING: A LITTLE
HELP NEEDED FOR BATHING: A LITTLE
TOILETING: TOTAL
CLIMB 3 TO 5 STEPS WITH RAILING: A LOT
PERSONAL GROOMING: A LITTLE
MOBILITY SCORE: 13
STANDING UP FROM CHAIR USING ARMS: A LOT
MOVING FROM LYING ON BACK TO SITTING ON SIDE OF FLAT BED WITH BEDRAILS: A LITTLE
PATIENT BASELINE BEDBOUND: NO
DRESSING REGULAR UPPER BODY CLOTHING: A LITTLE
MOBILITY SCORE: 13
PERSONAL GROOMING: A LITTLE
TURNING FROM BACK TO SIDE WHILE IN FLAT BAD: A LOT
HELP NEEDED FOR BATHING: A LITTLE
STANDING UP FROM CHAIR USING ARMS: A LOT
MOVING FROM LYING ON BACK TO SITTING ON SIDE OF FLAT BED WITH BEDRAILS: A LITTLE
MOVING TO AND FROM BED TO CHAIR: A LOT
WALKING IN HOSPITAL ROOM: A LOT
CLIMB 3 TO 5 STEPS WITH RAILING: A LOT
WALKING IN HOSPITAL ROOM: A LOT
TOILETING: A LOT
DRESSING REGULAR LOWER BODY CLOTHING: A LOT
TURNING FROM BACK TO SIDE WHILE IN FLAT BAD: A LOT
DRESSING REGULAR LOWER BODY CLOTHING: A LOT
DAILY ACTIVITIY SCORE: 16
MOVING TO AND FROM BED TO CHAIR: A LOT
DAILY ACTIVITIY SCORE: 17

## 2024-03-22 ASSESSMENT — PAIN - FUNCTIONAL ASSESSMENT
PAIN_FUNCTIONAL_ASSESSMENT: 0-10

## 2024-03-22 ASSESSMENT — PAIN SCALES - GENERAL
PAINLEVEL_OUTOF10: 0 - NO PAIN
PAIN_LEVEL: 0

## 2024-03-22 ASSESSMENT — PATIENT HEALTH QUESTIONNAIRE - PHQ9
SUM OF ALL RESPONSES TO PHQ9 QUESTIONS 1 & 2: 2
1. LITTLE INTEREST OR PLEASURE IN DOING THINGS: SEVERAL DAYS
2. FEELING DOWN, DEPRESSED OR HOPELESS: SEVERAL DAYS

## 2024-03-22 ASSESSMENT — COLUMBIA-SUICIDE SEVERITY RATING SCALE - C-SSRS
1. IN THE PAST MONTH, HAVE YOU WISHED YOU WERE DEAD OR WISHED YOU COULD GO TO SLEEP AND NOT WAKE UP?: NO
2. HAVE YOU ACTUALLY HAD ANY THOUGHTS OF KILLING YOURSELF?: NO
6. HAVE YOU EVER DONE ANYTHING, STARTED TO DO ANYTHING, OR PREPARED TO DO ANYTHING TO END YOUR LIFE?: NO

## 2024-03-22 NOTE — ANESTHESIA PROCEDURE NOTES
Spinal Block    Patient location during procedure: OR  Start time: 3/22/2024 1:59 PM  End time: 3/22/2024 2:05 PM  Reason for block: primary anesthetic  Staffing  Performed: ANTONIO   Authorized by: Shahzad Raymond DO    Performed by: ALLEGRA Shepard    Preanesthetic Checklist  Completed: patient identified, IV checked, risks and benefits discussed, surgical consent, monitors and equipment checked, pre-op evaluation, timeout performed and sterile techniques followed  Block Timeout  RN/Licensed healthcare professional reads aloud to the Anesthesia provider and entire team: Patient identity, procedure with side and site, patient position, and as applicable the availability of implants/special equipment/special requirements.  Patient on coagulant treatment: no  Timeout performed at: 3/22/2024 1:59 PM  Spinal Block  Patient position: sitting  Prep: Betadine  Sterility prep: cap, drape, gloves, hand hygiene and mask  Sedation level: light sedation  Patient monitoring: blood pressure and continuous pulse oximetry  Approach: midline  Vertebral space: L2-3  Injection technique: single-shot  Needle  Needle type: Quincke   Needle gauge: 22 G  Needle length: 3.5 in  Free flowing CSF: yes    Assessment  Sensory level: T6  Block outcome: patient comfortable  Procedure assessment: patient tolerated procedure well with no immediate complications

## 2024-03-22 NOTE — OP NOTE
Arthroplasty Total Knee Robot-Assisted, MARK DANE ALL POLY TIBIA, NERVE BLOCK has an event recorder (R) Operative Note     Date: 3/22/2024  OR Location: [facility]    Name: Rae Lundy : 1945, Age: 79 y.o., MRN: [id number], Sex: female    Diagnosis  Pre-op Diagnosis     * Unilateral primary osteoarthritis, right knee [M17.11] Post-op Diagnosis     * Unilateral primary osteoarthritis, right knee [M17.11]     Procedures  Arthroplasty Total Knee Robot-Assisted, MARK DANE ALL POLY TIBIA, NERVE BLOCK has an event recorder  [id number] - UT ARTHRP KNE CONDYLE&PLATU MEDIAL&LAT COMPARTMENTS      Surgeons      * Krunal Stone - Primary    Resident/Fellow/Other Assistant:  Surgeon(s) and Role:     * Lamine Culver PA-C - Assisting    Procedure Summary  Anesthesia: Spinal  ASA: III  Anesthesia Staff: Anesthesiologist: Shahzad Raymond DO  CRNA: RUSTY Barfield-CRNA; RUSTY Shepard-CRNA  Estimated Blood Loss: minmL  Intra-op Medications:   Administrations occurring from 1330 to 1430 on 24:   Medication Name Total Dose   ceFAZolin in dextrose (iso-os) (Ancef) IVPB 2 g 2 g              Anesthesia Record               Intraprocedure I/O Totals          Intake    Propofol Drip 0.00 mL    The total shown is the total volume documented since Anesthesia Start was filed.    lactated Ringer's infusion 1000.00 mL    ceFAZolin in dextrose (iso-os) (Ancef) IVPB 2 g 100.00 mL    Total Intake 1100 mL          Specimen: No specimens collected     Staff:   Circulator: Hiral Negrete RN  Scrub Person: Tanja Ramirez         Drains and/or Catheters: * None in log *    Tourniquet Times:   * Missing tourniquet times found for documented tourniquets in lo *     Implants:  Implants       Type Name Action Serial No.      Joint TIBIA COMPONENT, PS ALL POLYETHYLENE, SIZE 3, 11MM - [id number] Implanted      Joint FEM COMP, TRIATH CR SZ4 RIGHT - [id number] Implanted      Joint PATELLA, ASYMM TRIATH 29MM x 9MM -  [id number] Implanted               Findings: see procedure details    Indications: Rae Lundy is an 79 y.o. female who is having surgery for Unilateral primary osteoarthritis, right knee [M17.11].     The patient was seen in the preoperative area. The risks, benefits, complications, treatment options, non-operative alternatives, expected recovery and outcomes were discussed with the patient. The possibilities of reaction to medication, pulmonary aspiration, injury to surrounding structures, bleeding, recurrent infection, the need for additional procedures, failure to diagnose a condition, and creating a complication requiring transfusion or operation were discussed with the patient. The patient concurred with the proposed plan, giving informed consent.  The site of surgery was properly noted/marked if necessary per policy. The patient has been actively warmed in preoperative area. Preoperative antibiotics have been ordered and given within 1 hours of incision. Venous thrombosis prophylaxis have been ordered.    Procedure Details:   Preop diagnosis is DJD right knee  Postoperative diagnosis is same  Procedure total knee replacement using the Touchstorm computer assisted robotic-assisted knee replacement system    Anesthesia spinal with nerve block  EBL minimal    Implants Rosie all poly tibia  Tibial component size3  Femoral component size4 CR  Patella size29  Insert size11 CS    Primary surgeon Krunal Stone  Assisting surgeon [Lamine Culver] PA certified      Pre-operative alignment 11 degrees hyperextension 11 degrees valgus  Post-operative alignment 1 degree hyperextension 5.5 degrees valgus      This patient has progressive knee pain which has been refractory to conservative treatment.  The patient has decided to undergo elective total knee replacement.  The risks, benefits, outcomes and postoperative course were fully explained to the patient.  We discussed wear, loosening, blood clot infection, nerve damage,  numbness and tingling, failure of the procedure, stiffness, loss of life, loss of limb, and the need for possible revision surgery.  The patient understands the procedure and consents to surgical intervention.    The patient has pain in the knee that is increased with activity and weightbearing, and walks with an antalgic gait.  The pain is interfering with activities of daily living.  The patient has limited range of motion and pain with passive range of motion.  X-rays demonstrate joint space narrowing, subchondral sclerosis and osteophyte formation.  Symptoms are not improving with medication, therapy or supportive device for a period of at least 3 months.    The physician assistant was present through the entire case.  Given the nature of the disease process and the procedure to be performed skilled surgical first assistant was necessary during the case.  The assistant was necessary to hold retractors and manipulate the extremity during the procedure.  A certified scrub tech was at the back table managing the instruments and supplies for the surgical case.    Patient was taken to the operating room placed on the table in the supine position where upon adequate anesthesia was obtained  A tourniquet was applied to the lower extremity  The patient was then prepped and draped in the usual sterile manner the leg was then exsanguinated using an Esmarch bandage and the tourniquet was inflated to 250 mmHg  A surgical timeout was performed  A linear midline incision was made through the skin and subcutaneous tissues using sharp and blunt dissection.  A medial parapatellar arthrotomy was then performed and the patella was everted.  The patellar fat pad was then excised.  The femoral array and femoral checkpoint were then inserted in standard technique.  the tibial array was placed in the tibial checkpoints was applied.  Hip center was then identified by rotating the lower extremity.  Medial and lateral malleolar  checkpoints performed.  At this point femoral mapping was performed using the blue probe and accuracy was confirmed and verified.  Tibial mapping was then performed with the patella was well and accuracy was confirmed.  Pose capture was then performed both extension and flexion.  Flexion and extension gaps were assessed and adjusted appropriately.  Once we confirmed flexion and extension gaps the cut sequence was verified and confirmed.  The robot was then introduced into the field the sawblade was verified self-retaining retractors were placed and the femoral cuts were then performed.  Tibial cut was completed as well and all bony surfaces were removed without difficulty meniscal remnants were excised  Tibial trial was then placed on the tibial bone surface to assess for appropriate sizing.  Tibial component rotation was confirmed using the green probe.   The femoral trial was applied as was the polyethylene insert.  The knee was placed in flexion and extension,  stability was assessed throughout using the robotic system as well as manual tension.  Patellar reaming was then performed and peg holes were drilled for the patella and the patella trial was applied.  Patellofemoral tracking was assessed and once we confirmed stability and patellofemoral tracking the tibial component was pinned in position.  The remaining trials were removed and the tibial bone surface was completed using the cruciate punch all bony surfaces were then irrigated with a copious amount of pulsatile irrigation.  Cement was mixed on the back table using vacuum technique  Components were then inserted and completely seated without difficulty  A [place] elevator was used to remove any remaining cement and the knee was placed in full extension while the cement hardened  The knee joint was then irrigated with a copious pulsatile irrigation.  Joint capsule was repaired using interrupted #1 Vicryl suture.  3-0 Monocryl suture was used for the  underlying subcutaneous tissue and 4-0 Monocryl for the skin.  A dry sterile bulky dressing was applied checkpoints and array were removed prior to closure  Patient was taken to the postanesthesia care unit in good condition postoperative x-rays were reviewed and findings the procedure were discussed with the patient's family    This note was prepared using voice recognition software.  The details of this note are correct and have been reviewed, and corrected to the best of my ability.  Some grammatical areas may persist related to the Dragon software    Krunal Stone MD    [phone]      Complications:  None; patient tolerated the procedure well.    Disposition: PACU - hemodynamically stable.  Condition: stable         Krunal Stone  Phone Number: [phone]

## 2024-03-22 NOTE — ANESTHESIA PROCEDURE NOTES
Peripheral Block    Patient location during procedure: pre-op  Start time: 3/22/2024 1:20 PM  End time: 3/22/2024 1:35 PM  Reason for block: at surgeon's request and post-op pain management  Staffing  Performed: attending   Authorized by: Shahzad Raymond DO    Performed by: Shahzad Raymond DO  Preanesthetic Checklist  Completed: patient identified, IV checked, site marked, risks and benefits discussed, surgical consent, monitors and equipment checked, pre-op evaluation and timeout performed   Timeout performed at: 3/22/2024 1:20 PM  Peripheral Block  Patient position: laying flat  Prep: ChloraPrep  Patient monitoring: heart rate, cardiac monitor and continuous pulse ox  Block type: adductor canal (+IPACK)  Laterality: right  Injection technique: single-shot  Guidance: ultrasound guided  Local infiltration: lidocaine  Infiltration strength: 1 %  Dose: 5 mL  Needle  Needle gauge: 22 G  Needle length: 10 cm  Needle localization: anatomical landmarks and ultrasound guidance  Assessment  Injection assessment: negative aspiration for heme, no paresthesia on injection, incremental injection and local visualized surrounding nerve on ultrasound  Paresthesia pain: none  Heart rate change: no  Slow fractionated injection: yes  Additional Notes  Adductor canal and IPACK nerve blocks performed for post-operative analgesia. 1 mg IV versed given for procedural sedation. Sterile prep. 20 mL of 0.5% ropivacaine with decadron 5 mg given in divided doses for the adductor canal. 20 mL of 0.2% ropivacaine given for the IPACK block. Negative aspiration for heme every 5 mL injected. No pain, paresthesias. Patient tolerated the procedure well without complications.

## 2024-03-22 NOTE — INTERVAL H&P NOTE
History and physical is reviewed, patient re evaluated, no changes.  Procedure, risks, benefits, and postoperative course were discussed with the patient and consent is reviewed.    Krunal Stone MD

## 2024-03-22 NOTE — ANESTHESIA PREPROCEDURE EVALUATION
Rae Lundy is a 79 y.o. female here for:    Arthroplasty Total Knee Robot-Assisted, MARK DANE ALL POLY TIBIA, NERVE BLOCK has an event recorder  With Krunal Stone MD  Pre-Op Diagnosis Codes:     * Unilateral primary osteoarthritis, right knee [M17.11]    Relevant Problems   Cardiovascular  Nuclear stress 1/2021:  CONCLUSIONS:    1. SPECT Perfusion Study: Normal.    2. There is no scintigraphic evidence for inducible ischemia.    3. No evidence of scarred myocardium.    4. Left ventricle is small. The left ventricle systolic function is   hyperdynamic.    5. This is a low risk scan.     [facility] 2/2024:  CONCLUSIONS:   1. Left Main Coronary Artery: This artery is normal.   2. Left Anterior Descending Artery: presents luminal irregularities and contains patent previously placed stents.   3. Circumflex Coronary Artery: presents luminal irregularities.   4. Right Coronary Artery: significantly obstructed and fills via collaterals.   5. Mid RCA Lesion: The percent stenosis is 100%.   6. The Left Ventricular Ejection Fraction is 55%.       (+) Benign essential hypertension   (+) CAD S/P percutaneous coronary angioplasty   (+) Hypertensive heart disease with heart failure (CMS/[facility])   (+) Intracranial aneurysm   (+) Mixed hyperlipidemia   (+) Paroxysmal atrial fibrillation (CMS/[facility])   (+) Tachycardia-bradycardia syndrome (CMS/[facility])      Neuro/Psych   (+) Dementia (CMS/[facility])   (+) Intracranial aneurysm   (+) Sciatic neuropathy      Pulmonary   (+) COPD (chronic obstructive pulmonary disease) (CMS/[facility])   (+) History of home oxygen therapy      Hematology   (+) DANIELLE (iron deficiency anemia)      Musculoskeletal   (+) Lumbar degenerative disc disease   (+) Right knee DJD   (+) Unilateral primary osteoarthritis, right knee       Lab Results   Component Value Date    HGB 10.9 (L) 03/13/2024    HCT 33.2 (L) 03/13/2024    WBC 8.1 03/13/2024     03/13/2024     (L) 03/13/2024    K 4.1 03/13/2024      2024    CREATININE 0.62 2024    BUN 8 2024       Social History     Tobacco Use   Smoking Status Former    Packs/day: 1.00    Years: 10.00    Additional pack years: 0.00    Total pack years: 10.00    Types: Cigarettes    Quit date:     Years since quittin.2   Smokeless Tobacco Never       Allergies   Allergen Reactions    Prednisone Psychosis       Current Outpatient Medications   Medication Instructions    acetaminophen (Tylenol 8 HOUR) 650 mg ER tablet 2 tablets, oral, Daily    alendronate (FOSAMAX) 70 mg, oral, Weekly, Monday      aspirin 81 mg EC tablet 1 tablet, oral, Daily    benzonatate (TESSALON) 200 mg, oral, 3 times daily PRN, Do not crush or chew.    ergocalciferol (VITAMIN D-2) 1.25 mg, oral, Weekly, Monday     ferrous sulfate (325 mg ferrous sulfate) 325 mg, oral    hydroCHLOROthiazide (Microzide) 12.5 mg capsule 1 capsule, oral, Daily    ibuprofen 600 mg, oral, Every 6 hours PRN    ibuprofen 400 mg, oral, Every 6 hours PRN    ipratropium-albuteroL (Duo-Neb) 0.5-2.5 mg/3 mL nebulizer solution 3 mL, nebulization, Daily PRN    isosorbide mononitrate ER (Imdur) 30 mg 24 hr tablet 1 tablet, oral, Daily    magnesium oxide (Mag-Ox) 400 mg (241.3 mg magnesium) tablet 1 tablet, oral, Daily    metoprolol tartrate (LOPRESSOR) 25 mg, oral, 2 times daily    mirtazapine (REMERON) 15 mg, oral, Nightly    montelukast (SINGULAIR) 10 mg, oral, Nightly    multivit-min/ferrous fumarate (MULTI VITAMIN ORAL) 1 tablet, oral, Daily    naloxone (Narcan) 4 mg/0.1 mL nasal spray Instill 1 spray intranasally for opioid overdose; repeat in 5 minutes if no response.    nitroglycerin (NITROSTAT) 0.4 mg, sublingual, Every 5 min PRN    omeprazole (PRILOSEC) 20 mg, oral, Daily before breakfast, Do not crush or chew.    oxygen (O2) 5 L/min, inhalation, Every 24 hours    potassium chloride CR 10 mEq ER tablet 1 tablet, oral, Daily       Past Surgical History:   Procedure Laterality Date    BLADDER SURGERY       BREAST LUMPECTOMY  09/28/2017    Left Breast Lumpectomy    BUNIONECTOMY      CARDIAC CATHETERIZATION Left 02/23/2024    Procedure: Left Heart Cath, With LV;  Surgeon: Manfred Fitzpatrick MD;  Location: [facility];  Service: Cardiovascular;  Laterality: Left;  Left    CHOLECYSTECTOMY  09/28/2017    Cholecystectomy    COLONOSCOPY      CORONARY ANGIOPLASTY      CORONARY STENT PLACEMENT      CT HEAD ANGIO W AND WO IV CONTRAST  04/10/2019    CT HEAD ANGIO W AND WO IV CONTRAST 4/10/2019 [facility]    FOOT SURGERY      KNEE ARTHROSCOPY W/ DEBRIDEMENT  09/28/2017    Arthroscopy Knee Right    MR HEAD ANGIO WO IV CONTRAST  09/05/2019    MR HEAD ANGIO WO IV CONTRAST 9/5/2019 [facility]    MR HEAD ANGIO WO IV CONTRAST  01/19/2020    MR HEAD ANGIO WO IV CONTRAST 1/19/2020 [facility]INICAL LEGAC[facility]    MR NECK ANGIO WO IV CONTRAST  01/19/2020    MR NECK ANGIO WO IV CONTRAST 1/19/2020 [facility] CLINICAL LEGACY    OTHER SURGICAL HISTORY  09/28/2017    Surgery Foot Amputation Metatarsal And Toe    OTHER SURGICAL HISTORY  09/07/2022    Loop recorder insertion    THYROID SURGERY         Family History   Problem Relation Name Age of Onset    Arthritis Mother      Heart failure Mother      Esophageal cancer Mother      Rheumatic fever Mother      Other (heart problem) Mother      Esophageal cancer Father      Other (cardiac disorder) Son         NPO Details:  No data recorded    Physical Exam    Airway  Mallampati: I  TM distance: >3 FB  Neck ROM: full     Cardiovascular - normal exam     Dental - normal exam     Pulmonary - normal exam     Abdominal            Anesthesia Plan    History of general anesthesia?: yes  History of complications of general anesthesia?: no    ASA 3     regional, MAC and spinal     The patient is not a current smoker.    intravenous induction   Postoperative administration of opioids is intended.  Anesthetic plan and risks discussed with patient.    Plan discussed with CRNA.

## 2024-03-22 NOTE — NURSING NOTE
Patient admitted to room 609 in stable condition following right total knee surgery. Patient unable to move bilateral feet/legs as of yet. Family at bedside.

## 2024-03-22 NOTE — PROGRESS NOTES
03/22/24 1708   Discharge Planning   Living Arrangements Alone   Support Systems Children;Family members   Assistance Needed PTA pt lives alone in 1 level home with basement, pt stays on 1st floor, Ind ADLS and IADSL with cane and walker, drives, denies falls   Type of Residence Private residence  (1 level home with basement, laundry 1st floor)   Number of Stairs to Enter Residence 0  (ramp entry)   Number of Stairs Within Residence 12   Do you have animals or pets at home? No   Home or Post Acute Services Post acute facilities (Rehab/SNF/etc)   Type of Post Acute Facility Services Skilled nursing;Rehab   [facility]   Does the patient need discharge transport arranged? No   Financial Resource Strain   How hard is it for you to pay for the very basics like food, housing, medical care, and heating? Not hard   Housing Stability   In the last 12 months, was there a time when you were not able to pay the mortgage or rent on time? N   In the last 12 months, how many places have you lived? 1   In the last 12 months, was there a time when you did not have a steady place to sleep or slept in a shelter (including now)? N   Transportation Needs   In the past 12 months, has lack of transportation kept you from medical appointments or from getting medications? no   In the past 12 months, has lack of transportation kept you from meetings, work, or from getting things needed for daily living? No   Patient Choice   Provider Choice list and CMS website (https://medicare.gov/care-compare#search) for post-acute Quality and Resource Measure Data were provided and reviewed with: Patient   Patient / Family choosing to utilize agency / facility established prior to hospitalization No     Pt is s/p Elective Right Drake total knee arthroplasty 3/22/24. PT/OT eval AMPA scores are currently pending. Pt/family states pt lives alone and preference is additional IP rehab prior to  returning home and [facility]. Therapy supervisor notified of need for PT/OT eval on Saturday and states pt will be seen by PT/OT Saturday. Referral built and sent to [facility] awaiting acceptance and pt will need auth. CNP notified of pt/family DC preference.

## 2024-03-22 NOTE — ANESTHESIA POSTPROCEDURE EVALUATION
Patient: Rae Lundy    Procedure Summary       Date: 03/22/24 Room / Location: Y OR 11 / [facility]    Anesthesia Start: 1354 Anesthesia Stop: 1525    Procedure: Arthroplasty Total Knee Robot-Assisted, MARK DANE ALL POLY TIBIA, NERVE BLOCK has an event recorder (Right: Knee) Diagnosis:       Unilateral primary osteoarthritis, right knee      (Unilateral primary osteoarthritis, right knee [M17.11])    Surgeons: Krunal Stone MD Responsible Provider: Shahzad Raymond DO    Anesthesia Type: regional, MAC, spinal ASA Status: 3            Anesthesia Type: regional, MAC, spinal    Vitals Value Taken Time   BP 99/47 03/22/24 1526   Temp  03/22/24 1526   Pulse 61 03/22/24 1526   Resp 17 03/22/24 1526   SpO2 100 03/22/24 1526       Anesthesia Post Evaluation    Patient location during evaluation: bedside  Patient participation: complete - patient participated  Level of consciousness: responsive to verbal stimuli  Pain score: 0  Pain management: adequate  Airway patency: patent  Cardiovascular status: acceptable and stable  Respiratory status: acceptable and room air  Hydration status: acceptable  Postoperative Nausea and Vomiting: none        No notable events documented.

## 2024-03-23 VITALS
OXYGEN SATURATION: 96 % | SYSTOLIC BLOOD PRESSURE: 119 MMHG | RESPIRATION RATE: 20 BRPM | DIASTOLIC BLOOD PRESSURE: 60 MMHG | TEMPERATURE: 98.8 F | HEART RATE: 93 BPM

## 2024-03-23 LAB
ANION GAP SERPL CALC-SCNC: 10 MMOL/L (ref 10–20)
BUN SERPL-MCNC: 11 MG/DL (ref 6–23)
CALCIUM SERPL-MCNC: 8 MG/DL (ref 8.6–10.3)
CHLORIDE SERPL-SCNC: 104 MMOL/L (ref 98–107)
CO2 SERPL-SCNC: 27 MMOL/L (ref 21–32)
CREAT SERPL-MCNC: 0.69 MG/DL (ref 0.5–1.05)
EGFRCR SERPLBLD CKD-EPI 2021: 88 ML/MIN/1.73M*2
ERYTHROCYTE [DISTWIDTH] IN BLOOD BY AUTOMATED COUNT: 15.3 % (ref 11.5–14.5)
GLUCOSE SERPL-MCNC: 101 MG/DL (ref 74–99)
HCT VFR BLD AUTO: 33.2 % (ref 36–46)
HGB BLD-MCNC: 10.5 G/DL (ref 12–16)
MCH RBC QN AUTO: 28.8 PG (ref 26–34)
MCHC RBC AUTO-ENTMCNC: 31.6 G/DL (ref 32–36)
MCV RBC AUTO: 91 FL (ref 80–100)
NRBC BLD-RTO: 0 /100 WBCS (ref 0–0)
PLATELET # BLD AUTO: 273 X10*3/UL (ref 150–450)
POTASSIUM SERPL-SCNC: 4.3 MMOL/L (ref 3.5–5.3)
RBC # BLD AUTO: 3.64 X10*6/UL (ref 4–5.2)
SODIUM SERPL-SCNC: 137 MMOL/L (ref 136–145)
WBC # BLD AUTO: 6.2 X10*3/UL (ref 4.4–11.3)

## 2024-03-23 PROCEDURE — 99221 1ST HOSP IP/OBS SF/LOW 40: CPT | Performed by: INTERNAL MEDICINE

## 2024-03-23 PROCEDURE — 2500000002 HC RX 250 W HCPCS SELF ADMINISTERED DRUGS (ALT 637 FOR MEDICARE OP, ALT 636 FOR OP/ED): Mod: MUE | Performed by: INTERNAL MEDICINE

## 2024-03-23 PROCEDURE — 2500000001 HC RX 250 WO HCPCS SELF ADMINISTERED DRUGS (ALT 637 FOR MEDICARE OP): Performed by: INTERNAL MEDICINE

## 2024-03-23 PROCEDURE — 7100000011 HC EXTENDED STAY RECOVERY HOURLY - NURSING UNIT

## 2024-03-23 PROCEDURE — 97161 PT EVAL LOW COMPLEX 20 MIN: CPT | Mod: GP | Performed by: PHYSICAL THERAPIST

## 2024-03-23 PROCEDURE — 2500000004 HC RX 250 GENERAL PHARMACY W/ HCPCS (ALT 636 FOR OP/ED): Performed by: INTERNAL MEDICINE

## 2024-03-23 PROCEDURE — 97116 GAIT TRAINING THERAPY: CPT | Mod: GP,CQ

## 2024-03-23 PROCEDURE — 97530 THERAPEUTIC ACTIVITIES: CPT | Mod: GP,CQ

## 2024-03-23 PROCEDURE — 85027 COMPLETE CBC AUTOMATED: CPT | Performed by: ORTHOPAEDIC SURGERY

## 2024-03-23 PROCEDURE — 36415 COLL VENOUS BLD VENIPUNCTURE: CPT | Performed by: ORTHOPAEDIC SURGERY

## 2024-03-23 PROCEDURE — 97166 OT EVAL MOD COMPLEX 45 MIN: CPT | Mod: GO

## 2024-03-23 PROCEDURE — 2500000002 HC RX 250 W HCPCS SELF ADMINISTERED DRUGS (ALT 637 FOR MEDICARE OP, ALT 636 FOR OP/ED): Performed by: ORTHOPAEDIC SURGERY

## 2024-03-23 PROCEDURE — 80048 BASIC METABOLIC PNL TOTAL CA: CPT | Performed by: ORTHOPAEDIC SURGERY

## 2024-03-23 PROCEDURE — 2500000004 HC RX 250 GENERAL PHARMACY W/ HCPCS (ALT 636 FOR OP/ED): Performed by: ORTHOPAEDIC SURGERY

## 2024-03-23 PROCEDURE — 97110 THERAPEUTIC EXERCISES: CPT | Mod: GP,CQ

## 2024-03-23 PROCEDURE — 2500000001 HC RX 250 WO HCPCS SELF ADMINISTERED DRUGS (ALT 637 FOR MEDICARE OP): Performed by: ORTHOPAEDIC SURGERY

## 2024-03-23 RX ORDER — POTASSIUM CHLORIDE 750 MG/1
10 TABLET, FILM COATED, EXTENDED RELEASE ORAL DAILY
Status: DISCONTINUED | OUTPATIENT
Start: 2024-03-23 | End: 2024-03-24 | Stop reason: HOSPADM

## 2024-03-23 RX ORDER — METOPROLOL TARTRATE 25 MG/1
25 TABLET, FILM COATED ORAL 2 TIMES DAILY
Status: DISCONTINUED | OUTPATIENT
Start: 2024-03-23 | End: 2024-03-24 | Stop reason: HOSPADM

## 2024-03-23 RX ORDER — OXYCODONE HYDROCHLORIDE 5 MG/1
5 TABLET ORAL EVERY 6 HOURS PRN
Qty: 28 TABLET | Refills: 0 | Status: SHIPPED | OUTPATIENT
Start: 2024-03-23 | End: 2024-03-30

## 2024-03-23 RX ORDER — ISOSORBIDE MONONITRATE 30 MG/1
30 TABLET, EXTENDED RELEASE ORAL DAILY
Status: DISCONTINUED | OUTPATIENT
Start: 2024-03-23 | End: 2024-03-24 | Stop reason: HOSPADM

## 2024-03-23 RX ORDER — MONTELUKAST SODIUM 10 MG/1
10 TABLET ORAL NIGHTLY
Status: DISCONTINUED | OUTPATIENT
Start: 2024-03-23 | End: 2024-03-24 | Stop reason: HOSPADM

## 2024-03-23 RX ORDER — CYCLOBENZAPRINE HCL 5 MG
5 TABLET ORAL 3 TIMES DAILY PRN
Qty: 21 TABLET | Refills: 0
Start: 2024-03-23 | End: 2024-03-30

## 2024-03-23 RX ORDER — HYDROCHLOROTHIAZIDE 25 MG/1
12.5 TABLET ORAL DAILY
Status: DISCONTINUED | OUTPATIENT
Start: 2024-03-23 | End: 2024-03-24 | Stop reason: HOSPADM

## 2024-03-23 RX ORDER — ALENDRONATE SODIUM 70 MG/1
70 TABLET ORAL
Status: DISCONTINUED | OUTPATIENT
Start: 2024-03-24 | End: 2024-03-24 | Stop reason: HOSPADM

## 2024-03-23 RX ORDER — NITROGLYCERIN 0.4 MG/1
0.4 TABLET SUBLINGUAL EVERY 5 MIN PRN
Status: DISCONTINUED | OUTPATIENT
Start: 2024-03-23 | End: 2024-03-24 | Stop reason: HOSPADM

## 2024-03-23 RX ORDER — DOCUSATE SODIUM 100 MG/1
100 CAPSULE, LIQUID FILLED ORAL 2 TIMES DAILY
Qty: 20 CAPSULE | Refills: 0
Start: 2024-03-23 | End: 2024-04-02

## 2024-03-23 RX ORDER — LANOLIN ALCOHOL/MO/W.PET/CERES
400 CREAM (GRAM) TOPICAL DAILY
Status: DISCONTINUED | OUTPATIENT
Start: 2024-03-23 | End: 2024-03-24 | Stop reason: HOSPADM

## 2024-03-23 RX ORDER — MIRTAZAPINE 15 MG/1
15 TABLET, FILM COATED ORAL NIGHTLY
Status: DISCONTINUED | OUTPATIENT
Start: 2024-03-23 | End: 2024-03-24 | Stop reason: HOSPADM

## 2024-03-23 RX ORDER — ERGOCALCIFEROL 1.25 MG/1
1250 CAPSULE ORAL
Status: DISCONTINUED | OUTPATIENT
Start: 2024-03-24 | End: 2024-03-24 | Stop reason: HOSPADM

## 2024-03-23 RX ORDER — PANTOPRAZOLE SODIUM 40 MG/1
40 TABLET, DELAYED RELEASE ORAL
Status: DISCONTINUED | OUTPATIENT
Start: 2024-03-24 | End: 2024-03-24 | Stop reason: HOSPADM

## 2024-03-23 RX ADMIN — TRANEXAMIC ACID 1950 MG: 650 TABLET ORAL at 06:12

## 2024-03-23 RX ADMIN — OXYCODONE HYDROCHLORIDE 5 MG: 5 TABLET ORAL at 13:11

## 2024-03-23 RX ADMIN — HYDROCHLOROTHIAZIDE 12.5 MG: 25 TABLET ORAL at 13:07

## 2024-03-23 RX ADMIN — OXYCODONE HYDROCHLORIDE 10 MG: 5 TABLET ORAL at 20:31

## 2024-03-23 RX ADMIN — CEFAZOLIN SODIUM 2 G: 2 INJECTION, SOLUTION INTRAVENOUS at 04:03

## 2024-03-23 RX ADMIN — DOCUSATE SODIUM 100 MG: 100 CAPSULE, LIQUID FILLED ORAL at 20:31

## 2024-03-23 RX ADMIN — MIRTAZAPINE 15 MG: 15 TABLET, FILM COATED ORAL at 20:31

## 2024-03-23 RX ADMIN — POTASSIUM CHLORIDE 10 MEQ: 750 TABLET, EXTENDED RELEASE ORAL at 13:07

## 2024-03-23 RX ADMIN — ISOSORBIDE MONONITRATE 30 MG: 30 TABLET, EXTENDED RELEASE ORAL at 20:31

## 2024-03-23 RX ADMIN — CYCLOBENZAPRINE HYDROCHLORIDE 10 MG: 10 TABLET, FILM COATED ORAL at 21:38

## 2024-03-23 RX ADMIN — ACETAMINOPHEN 650 MG: 325 TABLET ORAL at 13:10

## 2024-03-23 RX ADMIN — DOCUSATE SODIUM 100 MG: 100 CAPSULE, LIQUID FILLED ORAL at 08:58

## 2024-03-23 RX ADMIN — ASPIRIN 81 MG: 81 TABLET, COATED ORAL at 20:31

## 2024-03-23 RX ADMIN — OXYCODONE HYDROCHLORIDE 5 MG: 5 TABLET ORAL at 08:58

## 2024-03-23 RX ADMIN — KETOROLAC TROMETHAMINE 7.5 MG: 30 INJECTION, SOLUTION INTRAMUSCULAR at 04:03

## 2024-03-23 RX ADMIN — MONTELUKAST SODIUM 10 MG: 10 TABLET, FILM COATED ORAL at 20:31

## 2024-03-23 RX ADMIN — MAGNESIUM OXIDE 400 MG (241.3 MG MAGNESIUM) TABLET 400 MG: TABLET at 13:07

## 2024-03-23 RX ADMIN — OXYCODONE HYDROCHLORIDE 10 MG: 5 TABLET ORAL at 03:52

## 2024-03-23 RX ADMIN — METOPROLOL TARTRATE 25 MG: 25 TABLET, FILM COATED ORAL at 13:07

## 2024-03-23 RX ADMIN — KETOROLAC TROMETHAMINE 7.5 MG: 30 INJECTION, SOLUTION INTRAMUSCULAR at 13:07

## 2024-03-23 RX ADMIN — ASPIRIN 81 MG: 81 TABLET, COATED ORAL at 08:58

## 2024-03-23 RX ADMIN — ACETAMINOPHEN 650 MG: 325 TABLET ORAL at 06:12

## 2024-03-23 ASSESSMENT — COGNITIVE AND FUNCTIONAL STATUS - GENERAL
MOVING FROM LYING ON BACK TO SITTING ON SIDE OF FLAT BED WITH BEDRAILS: A LITTLE
TURNING FROM BACK TO SIDE WHILE IN FLAT BAD: A LITTLE
MOBILITY SCORE: 13
MOVING TO AND FROM BED TO CHAIR: A LOT
TURNING FROM BACK TO SIDE WHILE IN FLAT BAD: A LOT
MOVING FROM LYING ON BACK TO SITTING ON SIDE OF FLAT BED WITH BEDRAILS: A LITTLE
TOILETING: A LOT
WALKING IN HOSPITAL ROOM: A LOT
WALKING IN HOSPITAL ROOM: A LOT
EATING MEALS: A LITTLE
WALKING IN HOSPITAL ROOM: A LOT
STANDING UP FROM CHAIR USING ARMS: A LOT
DRESSING REGULAR UPPER BODY CLOTHING: A LITTLE
CLIMB 3 TO 5 STEPS WITH RAILING: A LOT
TURNING FROM BACK TO SIDE WHILE IN FLAT BAD: A LITTLE
MOVING TO AND FROM BED TO CHAIR: A LOT
MOBILITY SCORE: 13
MOVING FROM LYING ON BACK TO SITTING ON SIDE OF FLAT BED WITH BEDRAILS: A LITTLE
HELP NEEDED FOR BATHING: A LOT
TOILETING: A LOT
DRESSING REGULAR LOWER BODY CLOTHING: A LOT
DRESSING REGULAR UPPER BODY CLOTHING: A LOT
MOVING TO AND FROM BED TO CHAIR: A LOT
STANDING UP FROM CHAIR USING ARMS: A LOT
PERSONAL GROOMING: A LOT
HELP NEEDED FOR BATHING: A LOT
CLIMB 3 TO 5 STEPS WITH RAILING: TOTAL
DRESSING REGULAR LOWER BODY CLOTHING: A LOT
CLIMB 3 TO 5 STEPS WITH RAILING: A LOT
DAILY ACTIVITIY SCORE: 15
TURNING FROM BACK TO SIDE WHILE IN FLAT BAD: A LITTLE
MOVING FROM LYING ON BACK TO SITTING ON SIDE OF FLAT BED WITH BEDRAILS: A LITTLE
WALKING IN HOSPITAL ROOM: A LOT
MOBILITY SCORE: 14
STANDING UP FROM CHAIR USING ARMS: A LOT
MOBILITY SCORE: 14
MOVING TO AND FROM BED TO CHAIR: A LOT
PERSONAL GROOMING: A LOT
STANDING UP FROM CHAIR USING ARMS: A LOT
DAILY ACTIVITIY SCORE: 13
CLIMB 3 TO 5 STEPS WITH RAILING: A LOT

## 2024-03-23 ASSESSMENT — PAIN SCALES - GENERAL
PAINLEVEL_OUTOF10: 10 - WORST POSSIBLE PAIN
PAINLEVEL_OUTOF10: 5 - MODERATE PAIN
PAINLEVEL_OUTOF10: 8
PAINLEVEL_OUTOF10: 0 - NO PAIN
PAINLEVEL_OUTOF10: 9
PAINLEVEL_OUTOF10: 2
PAINLEVEL_OUTOF10: 9

## 2024-03-23 ASSESSMENT — PAIN - FUNCTIONAL ASSESSMENT
PAIN_FUNCTIONAL_ASSESSMENT: 0-10

## 2024-03-23 ASSESSMENT — PAIN DESCRIPTION - LOCATION
LOCATION: KNEE

## 2024-03-23 ASSESSMENT — PAIN DESCRIPTION - ORIENTATION
ORIENTATION: RIGHT

## 2024-03-23 ASSESSMENT — PAIN DESCRIPTION - DESCRIPTORS
DESCRIPTORS: PRESSURE
DESCRIPTORS: THROBBING

## 2024-03-23 ASSESSMENT — ACTIVITIES OF DAILY LIVING (ADL): BATHING_ASSISTANCE: MINIMAL

## 2024-03-23 NOTE — PROGRESS NOTES
Physical Therapy    Physical Therapy Treatment    Patient Name: Rae Lundy  MRN: [id number]  Today's Date: 3/23/2024  Time Calculation  Start Time: 1033  Stop Time: 1115  Time Calculation (min): 42 min       Assessment/Plan   PT Assessment  PT Assessment Results: Decreased strength, Decreased range of motion, Decreased mobility, Decreased safety awareness, Obesity, Pain  Rehab Prognosis: Good  End of Session Communication: Bedside nurse  Assessment Comment: pt is receptive to  instruction and  would benefit from continued therapy to improe functional mobiliuty and safety .  End of Session Patient Position: Up in chair, Alarm on     Treatment/Interventions: Transfer training, Gait training, Therapeutic exercise, Home exercise program  PT Plan: Skilled PT  PT Frequency: BID  PT Discharge Recommendations: Moderate intensity level of continued care  Equipment Recommended upon Discharge: Wheeled walker   PT Recommended Transfer Status: Assist x1, Assistive device    General Visit Information:   PT  Visit  PT Received On: 03/23/24  General  Reason for Referral: R TKA DANE  Referred By: Bertha  Past Medical History Relevant to Rehab: COPD, HTN, TIA, dementia, A-fib, CAD, loop recorder, HLD, PCI, EF 55%  Family/Caregiver Present: No  Prior to Session Communication: Bedside nurse (cleared by nursing to participate)  Patient Position Received: Up in chair, Alarm on (but alarm malfunctioning , beeping intermittently, nursing notified)  General Comment: pt is agreeable to participate , states I dont know if I can walk it is pretty sore but I will try    Subjective     Precautions:  Precautions  LE Weight Bearing Status: Weight Bearing as Tolerated  Medical Precautions: Fall precautions  Post-Surgical Precautions:  (R knee immobilizer until +SLR and/or no sign of buckling)  Precautions Comment: No KI secondary to good quad control    Vital Signs:     Objective     Pain:  Pain Assessment  Pain Assessment: 0-10  Pain Score: 8  "(reports pain with activity , states \"it doesn't hurt if I dont move\")  Pain Type: Surgical pain  Pain Location: Knee  Pain Orientation: Right  Pain Descriptors: Throbbing  Pain Interventions: Cold applied           Extremity/Trunk Assessments:        AROM RLE (degrees)  R Knee Flexion 0-130: 76 (measured in sitting)  R Knee Extension 0-130: 5 (measured in longsit)       Activity Tolerance:  Activity Tolerance  Endurance: Decreased tolerance for upright activites    Treatments:  Therapeutic Exercise  Therapeutic Exercise Performed: Yes (instructed in and performed ther ex including AP, QS, GS, LAQ, hip flexion,HS and SLR)     Bed Mobility  Bed Mobility: No (seated upon arrival)    Ambulation/Gait Training  Ambulation/Gait Training Performed: Yes (Amb.10  with WW and Mod A with chair follow for safety ; Slow step to gait pattern , No buckling noted, vc for sequencing and walker placement)    Transfers  Transfer: Yes (transfers sit<-->stand with WW and vc for hand placement as well as to avoid \"plopping\")    Stairs  Stairs: No       Outcome Measures:  [facility] Basic Mobility  Turning from your back to your side while in a flat bed without using bedrails: A little  Moving from lying on your back to sitting on the side of a flat bed without using bedrails: A little  Moving to and from bed to chair (including a wheelchair): A lot  Standing up from a chair using your arms (e.g. wheelchair or bedside chair): A lot  To walk in hospital room: A lot  Climbing 3-5 steps with railing: A lot  Basic Mobility - Total Score: 14      EDUCATION:  Outpatient Education  Individual(s) Educated: Patient  Education Provided: Body Mechanics, Fall Risk, Home Exercise Program, Home Safety    Encounter Problems       Encounter Problems (Active)       Impaired mobility s/p TKA        Perform all bed mobility with indep.  (Progressing)       Start:  03/23/24    Expected End:  05/04/24            Perform all transfers with ww and SBA (Progressing)  "      Start:  03/23/24    Expected End:  05/04/24            Patient will ambulate >/= 50 ft. with ww and SBA (Progressing)       Start:  03/23/24    Expected End:  05/04/24            Patient will perform LE HEP with supervision  (Progressing)       Start:  03/23/24    Expected End:  05/04/24            Patient will achieve 0-90 degrees right knee ROM  (Progressing)       Start:  03/23/24    Expected End:  05/04/24               Pain - Adult

## 2024-03-23 NOTE — PROGRESS NOTES
Occupational Therapy    Evaluation    Patient Name: Rae Lundy  MRN: [id number]  Today's Date: 3/23/2024  Time Calculation  Start Time: 0854  Stop Time: 0930  Time Calculation (min): 36 min        Assessment:  OT Assessment: Pt with ADL impairment s/p R TKA.  Prognosis: Good  Barriers to Discharge: Decreased caregiver support  Evaluation/Treatment Tolerance: Patient tolerated treatment well  Medical Staff Made Aware: Yes  End of Session Communication: Bedside nurse  End of Session Patient Position: Up in chair, Alarm on  OT Assessment Results: Decreased ADL status, Decreased functional mobility  Prognosis: Good  Barriers to Discharge: Decreased caregiver support  Evaluation/Treatment Tolerance: Patient tolerated treatment well  Medical Staff Made Aware: Yes  Strengths: Living arrangement secure  Barriers to Participation: Comorbidities  Plan:  Treatment Interventions: ADL retraining, Functional transfer training  OT Frequency: 2 times per week  OT Discharge Recommendations: Moderate intensity level of continued care  Equipment Recommended upon Discharge: Wheeled walker  OT Recommended Transfer Status: Moderate assist  OT - OK to Discharge: Yes (Pt ok to d/c to next level of care pending clearance by medical team)  Treatment Interventions: ADL retraining, Functional transfer training    Subjective   Current Problem:  1. Paroxysmal atrial fibrillation (CMS/HCC)  Full code    Insert and maintain peripheral IV    Saline lock IV    Full code    Insert and maintain peripheral IV    Saline lock IV    DISCONTINUED: sodium chloride 0.9% infusion    DISCONTINUED: chlorhexidine (Hibiclens) 4 % liquid    DISCONTINUED: mupirocin (Bactroban) 2 % ointment 1 Application    DISCONTINUED: ceFAZolin in dextrose (iso-os) (Ancef) IVPB 1 g    CANCELED: Place in outpatient/hospital ambulatory surgery    CANCELED: Place in outpatient/hospital ambulatory surgery      2. Unilateral primary osteoarthritis, right knee        3.  Tachycardia-bradycardia syndrome (CMS/HCC)  Full code    Insert and maintain peripheral IV    Saline lock IV    Full code    Insert and maintain peripheral IV    Saline lock IV    DISCONTINUED: sodium chloride 0.9% infusion    DISCONTINUED: chlorhexidine (Hibiclens) 4 % liquid    DISCONTINUED: mupirocin (Bactroban) 2 % ointment 1 Application    DISCONTINUED: ceFAZolin in dextrose (iso-os) (Ancef) IVPB 1 g    CANCELED: Place in outpatient/hospital ambulatory surgery    CANCELED: Place in outpatient/hospital ambulatory surgery      4. Status post placement of implantable loop recorder  Full code    Insert and maintain peripheral IV    Saline lock IV    Full code    Insert and maintain peripheral IV    Saline lock IV    DISCONTINUED: sodium chloride 0.9% infusion    DISCONTINUED: chlorhexidine (Hibiclens) 4 % liquid    DISCONTINUED: mupirocin (Bactroban) 2 % ointment 1 Application    DISCONTINUED: ceFAZolin in dextrose (iso-os) (Ancef) IVPB 1 g    CANCELED: Place in outpatient/hospital ambulatory surgery    CANCELED: Place in outpatient/hospital ambulatory surgery      5. Encounter for loop recorder at end of battery life  Full code    Insert and maintain peripheral IV    Saline lock IV    Full code    Insert and maintain peripheral IV    Saline lock IV    DISCONTINUED: sodium chloride 0.9% infusion    DISCONTINUED: chlorhexidine (Hibiclens) 4 % liquid    DISCONTINUED: mupirocin (Bactroban) 2 % ointment 1 Application    DISCONTINUED: ceFAZolin in dextrose (iso-os) (Ancef) IVPB 1 g    CANCELED: Place in outpatient/hospital ambulatory surgery    CANCELED: Place in outpatient/hospital ambulatory surgery      6. COPD exacerbation (CMS/HCC)  oxygen (O2) gas therapy      7. Hypoxia  oxygen (O2) gas therapy      8. S/P total knee replacement, right  cyclobenzaprine (Flexeril) 5 mg tablet    docusate sodium (Colace) 100 mg capsule    oxyCODONE (Roxicodone) 5 mg immediate release tablet        General:  General  Reason for  Referral: ADL impairment s/p R TKA DANE  Referred By: PT/OT Bertha 3/22  Past Medical History Relevant to Rehab: COPD, HTN, TIA, dementia, A-fib, CAD, loop recorder, HLD, PCI, EF 55%  Family/Caregiver Present: Yes  Caregiver Feedback: son present: helpful with prior level information  Prior to Session Communication: Bedside nurse  Patient Position Received: Bed, 2 rail up, Alarm on  Preferred Learning Style: auditory, kinesthetic, verbal  General Comment: R knee DJD, s/p elective R TKA DANE Dr. Stone 3/22/24  Precautions:  LE Weight Bearing Status: Weight Bearing as Tolerated  Medical Precautions: Fall precautions  Post-Surgical Precautions:  (R knee immobilizer until +SLR and/or no sign of buckling)  Vital Signs:     Pain:  Pain Assessment  Pain Assessment: 0-10  Pain Score:  (2/10 at rest; up to 5/10 with mobility)  Pain Type: Surgical pain  Pain Location: Knee  Pain Orientation: Right    Objective   Cognition:  Overall Cognitive Status:  (Anxious, intermittent confusion)  Orientation Level: Disoriented to place (easily reoriented; increased time required to state year)  Insight: Mild  Processing Speed: Delayed           Home Living:  Home Living Comments: Lives alone (son lives nearby) in 1 story home; ramp to enter. Owns cane, rollator and 2ww. Home O2- uses 3L at night and PRN during the day. Walk in shower with seat and grab bars and raised toilet.  Prior Function:  Prior Function Comments: Mod. indep. with use of SPC or rollator walker. Denies any recent falls.  IADL History:     ADL:  Eating Assistance: Independent  Eating Deficit: Setup  Grooming Assistance: Moderate (to complete while standing)  Grooming Deficit: Supervision/safety, Increased time to complete, Standing with assistive device, Use of adaptive equipment  Bathing Assistance: Minimal  Bathing Deficit: Right upper leg, Left upper leg, Right lower leg including foot, Left lower leg including foot, Use of adaptive equipment, Increased time  to complete , Supervision/safety, Verbal cueing  UE Dressing Assistance: Moderate  UE Dressing Deficit: Fasteners, Pull around back, Increased time to complete, Supervision/safety  LE Dressing Assistance: Maximal  LE Dressing Deficit: Thread LLE into underwear, Thread RLE into underwear, Thread LLE into pants, Thread RLE into pants, Pull up over hips, Increased time to complete, Requires assistive device for steadying, Verbal cueing, Supervision/safety  Toileting Assistance with Device: Moderate  Toileting Deficit: Increased time to complete, Supervison/safety, Verbal cueing, Perineal hygiene, Clothing management down, Clothing management up, Bedside commode  Functional Assistance: Moderate  Functional Deficit: Increased time to complete, Commode transfer, Supervision/safety, Verbal cueing  ADL Comments: Pt requires modA to complete toileting and transfer for arin care and to manage clothing. Pt requires maxA to don/doff LB clothing due to  socks and pain. Pt requires assist to fasten bra at this time.  Activity Tolerance:  Endurance: Decreased tolerance for upright activites  Bed Mobility/Transfers: Bed Mobility  Bed Mobility: Yes  Bed Mobility 1  Bed Mobility 1: Supine to sitting  Level of Assistance 1: Minimum assistance  Bed Mobility Comments 1: Assist to manage RLE off EOB. Verbal cueing for hand placement for successful transfer.    Transfers  Transfer:  (Sit to/from stand with ww and modAx1; gait belt intact)      Functional Mobility:  Functional Mobility  Functional Mobility Performed: Yes (Stepping completed from commode at EOB to chair with minAx2 for safety due to pt anxious of falling, stability, and to manage walker.)     Strength:  Strength Comments: BUE strength grossly 4/5  Perception:     Coordination:  Movements are Fluid and Coordinated: Yes   Hand Function:  Gross Grasp: Functional  Coordination: Functional  Extremities: RUE   RUE : Within Functional Limits and LUE   LUE: Within Functional  Limits        Outcome Measures:[facility] Daily Activity  Putting on and taking off regular lower body clothing: A lot  Bathing (including washing, rinsing, drying): A lot  Putting on and taking off regular upper body clothing: A little  Toileting, which includes using toilet, bedpan or urinal: A lot  Taking care of personal grooming such as brushing teeth: A lot  Eating Meals: None  Daily Activity - Total Score: 15        Education Documentation  Body Mechanics, taught by Glory Liao OT at 3/23/2024 11:43 AM.  Learner: Family, Patient  Readiness: Eager  Method: Explanation  Response: Needs Reinforcement, Verbalizes Understanding    Precautions, taught by Glory Liao OT at 3/23/2024 11:43 AM.  Learner: Family, Patient  Readiness: Eager  Method: Explanation  Response: Needs Reinforcement, Verbalizes Understanding    ADL Training, taught by Glory Liao OT at 3/23/2024 11:43 AM.  Learner: Family, Patient  Readiness: Eager  Method: Explanation  Response: Needs Reinforcement, Verbalizes Understanding    Education Comments  No comments found.             Goals:  Encounter Problems       Encounter Problems (Active)       OT Goals       Patient will transfer to bed/chair/toilet with CGA and use of FWW. (Progressing)       Start:  03/23/24    Expected End:  03/30/24            Pt will complete LB dressing with Shefali for use of AE.   (Progressing)       Start:  03/23/24    Expected End:  03/30/24            Pt will tolerate 15 minutes of activity with 1 rest break to improve endurance with I/ADL tasks.  (Progressing)       Start:  03/23/24    Expected End:  03/30/24            Pt will ambulate functional household distance with SBA and use of FWW to complete I/ADL task.   (Progressing)       Start:  03/23/24    Expected End:  03/30/24

## 2024-03-23 NOTE — PROGRESS NOTES
Physical Therapy    Physical Therapy Treatment    Patient Name: Rae Lundy  MRN: [id number]  Today's Date: 3/23/2024  Time Calculation  Start Time: 1303  Stop Time: 1346  Time Calculation (min): 43 min       Assessment/Plan   PT Assessment  PT Assessment Results: Decreased strength, Decreased range of motion, Decreased mobility, Decreased safety awareness, Obesity, Pain  Rehab Prognosis: Good  End of Session Communication: Bedside nurse  Assessment Comment: pt is receptive to  instruction and  would benefit from continued therapy to improve functional mobility and safety .  End of Session Patient Position: Up in chair, Alarm on     Treatment/Interventions: Transfer training, Gait training, Range of motion, Therapeutic exercise, Home exercise program  PT Plan: Skilled PT  PT Frequency: BID  PT Discharge Recommendations: Moderate intensity level of continued care  Equipment Recommended upon Discharge: Wheeled walker PT Recommended Transfer Status: Assist x1, Assistive device    General Visit Information:   PT  Visit  PT Received On: 03/23/24  General  Reason for Referral: TKR  Referred By: Bertha  Past Medical History Relevant to Rehab: COPD, HTN, TIA, dementia, A-fib, CAD, loop recorder, HLD, PCI, EF 55%  Family/Caregiver Present: No  Prior to Session Communication: Bedside nurse (cleared by nursing to participate)  Patient Position Received:  (ambualting with nursing coming out of restroom)  General Comment: pt initially requesting to return to bed , however states she is agreeable to participate, stating I will do what I need to do, decdided to remain in recline chair at end of session    General Observations:        Subjective     Precautions:  Precautions  LE Weight Bearing Status: Weight Bearing as Tolerated  Medical Precautions: Fall precautions  Post-Surgical Precautions:  (R knee immobilizer until +SLR and/or no sign of buckling)  Precautions Comment: No KI secondary to good quad control    Vital Signs:      Objective     Pain:  Pain Assessment  Pain Assessment: 0-10  Pain Score: 9  Pain Type: Surgical pain, Acute pain (states it hurts a lot when I walk)  Pain Location: Knee (also c/o discofort at dorsal aspect of R foot)  Pain Orientation: Right  Pain Descriptors: Pressure  Pain Interventions: Cold applied    Cognition:  Cognition  Orientation Level:  (pt able to answer questions and participate)      Extremity/Trunk Assessments:       RLE   RLE : Exceptions to WFL  AROM RLE (degrees)  R Knee Flexion 0-130: 72 (measured in sitting)  R Knee Extension 0-130: 4 (measured in longsit)       Activity Tolerance:  Activity Tolerance  Endurance: Decreased tolerance for upright activites    Treatments:  Therapeutic Exercise  Therapeutic Exercise Performed: Yes  Therapeutic Exercise Activity 1: Pt performed supine ther ex including ankle pumps, quad sets, glut sets B/L LEs and heel slides and SAQwith B  LE 10 to 15 reps x1, SLR 5 reps x2. Cues for technique and breathing.  Therapeutic Exercise Activity 2: Pt performed seated ther ex including heel/toe raises and hip ABD, LAQ, heel slides, and marches 15 reps x1. Reviewed home exercise program. Educated on goal for 2-3 sets of 20 reps to tolerance. Pt with good understanding.     Bed Mobility  Bed Mobility: No (declined returned to bed , preferred to sit up in recliner)    Ambulation/Gait Training  Ambulation/Gait Training Performed: Yes  Ambulation/Gait Training 1  Surface 1: Level tile  Device 1: Rolling walker  Comments/Distance (ft) 1: ambulating 15 ft with WW and Min A with slow step to gait pattern and vc for walker placement , sequencing and to avoid advancing WW too far FW    Transfers  Transfer: Yes  Transfer 1  Technique 1: Sit to stand, Stand to sit  Trials/Comments 1: transfers sit <--> stand with WW and Mod A with vc for hand placment and technique    Stairs  Stairs: No     Outcome Measures:  [facility] Basic Mobility  Turning from your back to your side while in a flat  bed without using bedrails: A little  Moving from lying on your back to sitting on the side of a flat bed without using bedrails: A little  Moving to and from bed to chair (including a wheelchair): A lot  Standing up from a chair using your arms (e.g. wheelchair or bedside chair): A lot  To walk in hospital room: A lot  Climbing 3-5 steps with railing: A lot  Basic Mobility - Total Score: 14      EDUCATION:  Outpatient Education  Individual(s) Educated: Patient  Education Provided: Body Mechanics, Fall Risk, Home Exercise Program, Home Safety    Encounter Problems       Encounter Problems (Active)       Impaired mobility s/p TKA        Perform all bed mobility with indep.  (Progressing)       Start:  03/23/24    Expected End:  05/04/24            Perform all transfers with ww and SBA (Progressing)       Start:  03/23/24    Expected End:  05/04/24            Patient will ambulate >/= 50 ft. with ww and SBA (Progressing)       Start:  03/23/24    Expected End:  05/04/24            Patient will perform LE HEP with supervision  (Progressing)       Start:  03/23/24    Expected End:  05/04/24            Patient will achieve 0-90 degrees right knee ROM  (Progressing)       Start:  03/23/24    Expected End:  05/04/24               Pain - Adult

## 2024-03-23 NOTE — CARE PLAN
Problem: Pain - Adult  Goal: Verbalizes/displays adequate comfort level or baseline comfort level  Outcome: Progressing     Problem: Safety - Adult  Goal: Free from fall injury  Outcome: Progressing     Problem: Safety - Adult  Goal: Free from fall injury  Outcome: Progressing     Problem: Discharge Planning  Goal: Discharge to home or other facility with appropriate resources  Outcome: Progressing     Problem: Chronic Conditions and Co-morbidities  Goal: Patient's chronic conditions and co-morbidity symptoms are monitored and maintained or improved  Outcome: Progressing     Problem: Skin  Goal: Participates in plan/prevention/treatment measures  Outcome: Progressing  Goal: Prevent/manage excess moisture  Outcome: Progressing  Goal: Prevent/minimize sheer/friction injuries  Outcome: Progressing   The patient's goals for the shift include      The clinical goals for the shift include increase walking

## 2024-03-23 NOTE — PROGRESS NOTES
Therapy has evaluated patient , Am-pac 14/15, sent updated therapy notes to facility and requested hospital insurance team to initiate precert at this time.         Radha Quezada RN

## 2024-03-23 NOTE — DISCHARGE INSTRUCTIONS
Total Knee Replacement  Discharge Instructions    To prevent Clot formation, you have been placed on the following medication:  ASA 81 mg twice a day for 30 days started on 3/22/24.  Surgical Site Care:  .Change dressing once a day and PRN (as needed). Apply 4 x 4 sponge and light tape. If glue present, leave open to air.  You may leave wound open to air after initial dressing removal, if wound is clean, dry and intact  If Aquacel Ag dressing is present, do not remove dressing for 7 days, unless heavily saturated. If heavily saturated, remove dressing and start using instructions above  Staples will be removed on post-operative day 14 and steri-strips applied  Showering is permitted starting POD1 if waterproof Aquacel dressing is present or when the incision is covered with 4 x 4 and Tegaderm waterproof dressing  Until all areas of incision are healed.    Physical Therapy:  Weight Bearing Status:  WBAT  Precautions, Per Physical Therapy Handout  Pain Medications  You were given  oxycodone  Wean off pain medications as you deem appropriate as long as pain is under control  Cold packs/Ice packs/Machine  May be used 3 times daily for 15-30 minutes as necessary  Be sure to have a barrier (cloth, clothing, towel) between the site and the ice pack to prevent frostbite  Contact [facility] office if  Increased redness, swelling, drainage of any kind, and/or pain to surgery site.  As well as new onset fevers and or chills.  These could signify an infection.  Calf or thigh tenderness to touch as well as increased swelling or redness.  This could signify a clot formation.  Numbness or tingling to an area around the incision site or below the incision site (toes).  Any rash appears, increased  or new onset nausea/vomiting occur.  This may indicate a reaction to a medication.  Phone # [phone].  Follow up with Surgeon   I acknowledge that I have received loli hose and understand the instructions on how and when  to wear them (on during the day, off at night)   Discharging RN who has gone over instructions and acknowledges brynn hose have been received   Ice 5 times a day for 20 minutes each session to operative extremity for two weeks.   BRYNN hose to be worn for three weeks. Can be removed for skin care and hygiene.   Incentive spirometer 10 times every hour while awake for two weeks.

## 2024-03-23 NOTE — PROGRESS NOTES
Progress Note   TKA    Subjective:     Post-Operative Day: 1 Status Post right Total Knee Arthroplasty  Systemic or Specific Complaints:No Complaints    Objective:     Visit Vitals  /69 (BP Location: Left arm, Patient Position: Lying)   Pulse 74   Temp 36.1 °C (97 °F)   Resp 16       General: alert and oriented, in no acute distress, appears stated age, and cooperative   Wound: no erythema, no edema, no drainage, and dressing clean dry and intact   Motion: Painful range of Motion   DVT Exam: No evidence of DVT seen on physical exam.  Negative Francheska's sign.  No significant calf/ankle edema.       Knee swollen but thigh soft to palpation. Moving foot and ankle. Good distal pulses.      Data Review  Recent Results (from the past 24 hour(s))   CBC    Collection Time: 03/23/24  7:30 AM   Result Value Ref Range    WBC 6.2 4.4 - 11.3 x10*3/uL    nRBC 0.0 0.0 - 0.0 /100 WBCs    RBC 3.64 (L) 4.00 - 5.20 x10*6/uL    Hemoglobin 10.5 (L) 12.0 - 16.0 g/dL    Hematocrit 33.2 (L) 36.0 - 46.0 %    MCV 91 80 - 100 fL    MCH 28.8 26.0 - 34.0 pg    MCHC 31.6 (L) 32.0 - 36.0 g/dL    RDW 15.3 (H) 11.5 - 14.5 %    Platelets 273 150 - 450 x10*3/uL   Basic metabolic panel    Collection Time: 03/23/24  7:30 AM   Result Value Ref Range    Glucose 101 (H) 74 - 99 mg/dL    Sodium 137 136 - 145 mmol/L    Potassium 4.3 3.5 - 5.3 mmol/L    Chloride 104 98 - 107 mmol/L    Bicarbonate 27 21 - 32 mmol/L    Anion Gap 10 10 - 20 mmol/L    Urea Nitrogen 11 6 - 23 mg/dL    Creatinine 0.69 0.50 - 1.05 mg/dL    eGFR 88 >60 mL/min/1.73m*2    Calcium 8.0 (L) 8.6 - 10.3 mg/dL         Assessment:     Status Post right Total Knee Arthroplasty. Doing well postoperatively.     Acute postoperative pain: Reports mild to moderate pain to operative extremity.  Exacerbated by movement, relieved by rest ice and pain medication.  Continue current pain management.    Acute postoperative blood loss anemia secondary to expected surgical blood loss: Hemoglobin  this a.m. 10.5.  Patient asymptomatic, remains hemodynamically stable with no signs of active bleeding.          Plan:      1: Continues current post-op course :  2:  Continue Deep venous thrombosis prophylaxis: Aspirin 81 mg twice daily for 30 days  3:  Continue physical therapy: Weightbearing as tolerated to operative extremity  4:  Continue Pain Control  5.   Discharge planning: Patient plans to go to skilled nursing facility at discharge.  [facility] pre-CERT pending at this time.  Social work and TCC following for discharge planning.  6.  [facility] signed and discharge scripts on patient's chart.    Aditya Easton, APRN-CNP

## 2024-03-23 NOTE — PROGRESS NOTES
Physical Therapy    Physical Therapy Evaluation    Patient Name: Rae Lundy  MRN: [id number]  Today's Date: 3/23/2024   Time Calculation  Start Time: 0910  Stop Time: 0924  Time Calculation (min): 14 min    Assessment/Plan   PT Assessment  PT Assessment Results: Decreased strength, Decreased range of motion, Impaired balance, Decreased mobility, Decreased cognition, Impaired judgement, Decreased safety awareness, Obesity, Pain  Rehab Prognosis: Good  Barriers to Discharge: Pt. lives alone and has h/o dementia  End of Session Communication: Bedside nurse  Assessment Comment: pt. is s/p R TKA POD#1. Pt. is requiring modAx1 for mobility at this time. Pt. would benefit from continued PT to increase mobility and level of indep.  End of Session Patient Position: Up in chair, Alarm on  IP OR SWING BED PT PLAN  Inpatient or Swing Bed: Inpatient  PT Plan  Treatment/Interventions: Bed mobility, Transfer training, Gait training, Stair training, Strengthening, Range of motion, Therapeutic exercise, Therapeutic activity, Home exercise program  PT Plan: Skilled PT  PT Frequency: BID  PT Discharge Recommendations: Moderate intensity level of continued care  Equipment Recommended upon Discharge: Wheeled walker  PT Recommended Transfer Status: Assist x1, Assistive device  PT - OK to Discharge: Yes (to next level of care, when medically stable)          General Visit Information:  General  Reason for Referral: R TKA DANE  Referred By: Bertha  Past Medical History Relevant to Rehab: COPD, HTN, TIA, dementia, A-fib, CAD, loop recorder, HLD, PCI, EF 55%  Family/Caregiver Present: Yes  Caregiver Feedback: son present  Patient Position Received:  (Pt. on BSC with OT present)  General Comment: R knee DJD, s/p elective R TKA DANE Stone 3/22/24    Home Living:  Home Living  Home Living Comments: Lives alone (son lives nearby) in 1 story home; ramp to enter. Owns cane, rollator and 2ww. Home O2- uses 3L at night and PRN during the  day.    Prior Level of Function:  Prior Function Per Pt/Caregiver Report  Prior Function Comments: Mod. indep. with use of SPC or rollator walker. Denies any recent falls.    Precautions:  Precautions  LE Weight Bearing Status: Weight Bearing as Tolerated  Medical Precautions: Fall precautions  Post-Surgical Precautions:  (R knee immobilizer until +SLR and/or no sign of buckling)        Pain:  Pain Assessment  Pain Assessment: 0-10  Pain Score:  (2/10 at rest; up to 5/10 with mobility)  Pain Type: Surgical pain  Pain Location: Knee  Pain Orientation: Right    Cognition:  Cognition  Overall Cognitive Status:  (Anxious, intermittent confusion)  Orientation Level: Disoriented to place (easily reoriented; increased time required to state year)  Insight: Mild  Processing Speed: Delayed    General Assessments:            Strength  Strength Comments: LLE WNL 4+/5; RLE (-) SLR, R hip flex and quad 3/5, R ankle DF/PF WFL                 Functional Assessments:     Bed Mobility  Bed Mobility:  (NT; on BSC  upon entering and in recliner at end of session)  Transfers  Transfer:  (Sit to/from stand with ww and modAx1; gait belt intact)  Ambulation/Gait Training  Ambulation/Gait Training Performed:  (Amb. 5' with ww, gait belt and modAx1; Slow, non-reciprocal, antalgic gait; Knee immobilizer was NOT worn- no buckling noted)          Extremity/Trunk Assessments:  RUE   RUE : Within Functional Limits  LUE   LUE: Within Functional Limits  RLE   RLE :  (R hip and ankle ROM WFL; R knee ~ 10-70 degrees (seated flexion AROM ))  LLE   LLE : Within Functional Limits    Outcome Measures:  [facility] Basic Mobility  Turning from your back to your side while in a flat bed without using bedrails: A little  Moving from lying on your back to sitting on the side of a flat bed without using bedrails: A lot  Moving to and from bed to chair (including a wheelchair): A lot  Standing up from a chair using your arms (e.g. wheelchair or bedside chair): A  lot  To walk in hospital room: A lot  Climbing 3-5 steps with railing: A lot  Basic Mobility - Total Score: 13                          Goals:  Encounter Problems       Encounter Problems (Active)       Impaired mobility s/p TKA        Perform all bed mobility with indep.        Start:  03/23/24    Expected End:  05/04/24            Perform all transfers with ww and SBA       Start:  03/23/24    Expected End:  05/04/24            Patient will ambulate >/= 50 ft. with ww and SBA       Start:  03/23/24    Expected End:  05/04/24            Patient will perform LE HEP with supervision        Start:  03/23/24    Expected End:  05/04/24            Patient will achieve 0-90 degrees right knee ROM        Start:  03/23/24    Expected End:  05/04/24               Pain - Adult            Education Documentation  Mobility Training, taught by Sakina Euceda PT at 3/23/2024 11:12 AM.  Learner: Patient  Readiness: Acceptance  Method: Explanation  Response: Verbalizes Understanding, Needs Reinforcement  Comment: see note    Education Comments  No comments found.

## 2024-03-23 NOTE — CONSULTS
Consults    Reason For Consult   Postop medical management    History Of Present Illness  Rae Lundy is a 79 y.o. female with a known history of DJD of the knee, COPD, CAD, HTN, HLP, chronic GI blood loss from angiodysplasias, Villeda's esophagus, history of TIA, memory impairment, grief reaction (after losing her  last year).      She underwent an elective right total knee replacement yesterday.  The patient tells me that she did not have any problems postop with shortness of breath, chest pains, palpitations, or severe pain.  Last bowel movement was yesterday morning prior to coming to the hospital for surgery.    This morning she continues to remain alert, denies any shortness of breath, no chest pains or palpitations.     Past Medical History  -Coronary artery disease s/p stent to LAD1/23/2020  -Paroxysmal atrial fibrillation not on anticoagulation due to GI blood loss  -Hypertension  -COPD  -Hyperlipidemia  -Memory impairment  -Cervical spinal stenosis  -History of TIA  -Recent thyroid nodule s/p resection  -History of Villeda's esophagus and duodenal ulcer on EGD 10/2019  -History of recurrent iron deficiency anemia, angiodysplasia of duodenum on EGD 2019  -History of panic attacks   -Vitamin D deficiency   -Urinary incontinence   -DJD of the hands & knees    Surgical History  -Remote right knee arthroscopic surgery  -Left breast cyst/lumpectomy in 1990-benign  -Laparoscopic cholecystectomy 1995  -EGD 12/17/2014-mild chronic gastritis, negative H. pylori  -Colonoscopy 12/29/2014-colon polyps (hyperplastic)  -Left fourth toe amputation for bunion 2015  -EGD 8/10/2016-gastric polyps, Villeda's esophagus on pathology  -EGD 10/29/2019-Villeda's esophagus, hiatal hernia, small duodenal ulcer, gastritis with bile reflux, and duodenal angiodysplasias.  -Right thyroidectomy 11/8/2019  -Cardiac catheterization 11/11/2019--right dominant system with 100% mid chronic occlusion and 95% distal stenosis with  collaterals.  LAD with second major diagonal branch with sharp bend and 50-70%  stenosis which appeared focal.  Other lesser stenoses, EF = 50%  -Loop recorder placed 2019  -EGD 2019-diffuse mild inflammation with edema and erythema  -Cardiac catheterization 2020 with YAYA to proximal LAD on 2020   -Colonoscopy 10/14/2020-polyp (hyperplastic), diverticulosis  -Bladder suspension-urethral sling and cystoscopy 7/15/2022   -Bladder surgery for incontinence at [facility]-cystoscopy with Bulkamid 2022  -Right total knee replacement 3/22/2024     Social History  -Tobacco-quit smoking 2016 after 1 pack daily  for 45 years  -Alcohol-rare  -Drugs-denied  -Marital-she was  last year, lives alone and is independent in her ADLs.  Son lives nearby.  -Qiwwnbiisd-fgpeqdw-ewzgd trustee for [employer]    Family History  -Father- age 62-CA esophagus (was both a smoker and drinker)  -Mother- age 73, inadvertent drug OD, history of heart murmur  -Siblings-2 brothers & 1 sister-A&W  -Children-3 sons and 1 daughter-A&W     Allergies  Prednisone    Review of Systems:  She denies any recent headaches or dizziness.  Sleep is somewhat improved since starting mirtazapine last month.  Appetite is okay, no heartburn, no nausea or vomiting.  No recent worsening of her breathing-she does use her oxygen at night but just as needed.  No recent cough, no chest pains, no palpitations.  No pedal edema.  No dysuria or hematuria.  Her son notes that she is continuing to have significant short-term memory problems.     Physical Exam   /69 (BP Location: Left arm, Patient Position: Lying)   Pulse 74   Temp 36.1 °C (97 °F)   Resp 16   Wt 81.6 kg (179 lb 14.3 oz)   SpO2 93%   Physical exam:  -General appearance: Well-developed, well-nourished white female, sitting up in a recliner, alert and in NAD.  Son is in the room with her  -Vital signs:  As above  -HEENT: Pupils are reactive, extraocular movements  intact.  Lids, conjunctiva, sclera are normal.  Mouth/pharynx normal  -Neck: No JVD  -Chest: Lungs are clear to auscultation  -Cardiac: Regular rhythm  -Abdomen: Bowel sounds active  -Extremities: Dressings in place  -Skin: No rash  -Neurologic:   Patient is alert, some mild memory loss  -Behavior/Emotional:  Appropriate, cooperative    Relevant Results  -CBC with a WBC of 6.2, H/H stable from preop at 10.5/33.2.  Platelet count 273 K.  -Chemistry panel with a blood sugar of 101, normal electrolytes.  BUN 11 with a creatinine of 0.69.      Current Facility-Administered Medications:     acetaminophen (Tylenol) tablet 650 mg, 650 mg, oral, q6h SAMIA, Krunal Stone MD, 650 mg at 03/23/24 0612    [START ON 3/24/2024] alendronate (Fosamax) tablet 70 mg, 70 mg, oral, Weekly, Kary Garza MD    aspirin EC tablet 81 mg, 81 mg, oral, BID, Krunal Stone MD, 81 mg at 03/23/24 0858    benzocaine-menthol (Cepastat Sore Throat) 15-3.6 mg lozenge 1 lozenge, 1 lozenge, Mouth/Throat, q4h PRN, Krunal Stone MD    bisacodyl (Dulcolax) EC tablet 10 mg, 10 mg, oral, Daily PRN, Krunal Stone MD    cyclobenzaprine (Flexeril) tablet 10 mg, 10 mg, oral, TID PRN, Krunal Stone MD    diphenhydrAMINE (BENADryl) liquid 12.5 mg, 12.5 mg, oral, q6h PRN, Krunal Stone MD    docusate sodium (Colace) capsule 100 mg, 100 mg, oral, BID, Krunal Stone MD, 100 mg at 03/23/24 0858    [START ON 3/24/2024] ergocalciferol (Vitamin D-2) capsule 1,250 mcg, 1,250 mcg, oral, Weekly, Kary Garza MD    hydroCHLOROthiazide (HYDRODiuril) tablet 12.5 mg, 12.5 mg, oral, Daily, Kary Garza MD    HYDROmorphone (Dilaudid) injection 0.5 mg, 0.5 mg, intravenous, q4h PRN, Krunal Stone MD    isosorbide mononitrate ER (Imdur) 24 hr tablet 30 mg, 30 mg, oral, Daily, Kary Gazra MD    ketorolac (Toradol) injection 7.5 mg, 7.5 mg, intravenous, q6h, Krunal Stone MD, 7.5 mg at 03/23/24 0403    lactated Ringer's  infusion, 50 mL/hr, intravenous, Continuous, Krunal Stone MD    magnesium oxide (Mag-Ox) tablet 400 mg, 400 mg, oral, Daily, Kary Garza MD    metoprolol tartrate (Lopressor) tablet 25 mg, 25 mg, oral, BID, Kary Garza MD    mirtazapine (Remeron) tablet 15 mg, 15 mg, oral, Nightly, Kary Garza MD    montelukast (Singulair) tablet 10 mg, 10 mg, oral, Nightly, Kary Garza MD    morphine injection 2 mg, 2 mg, intravenous, q2h PRN, Krunal Stone MD    nitroglycerin (Nitrostat) SL tablet 0.4 mg, 0.4 mg, sublingual, q5 min PRN, Kary Garza MD    ondansetron ODT (Zofran-ODT) disintegrating tablet 4 mg, 4 mg, oral, q8h PRN **OR** ondansetron (Zofran) injection 4 mg, 4 mg, intravenous, q8h PRN, Krunal Stone MD    oxyCODONE (Roxicodone) immediate release tablet 10 mg, 10 mg, oral, q4h PRN, Krunal Stone MD, 10 mg at 03/23/24 0352    oxyCODONE (Roxicodone) immediate release tablet 5 mg, 5 mg, oral, q4h PRN, Krunal Stone MD, 5 mg at 03/23/24 0858    oxygen (O2) therapy, 2 L/min, inhalation, Continuous, Krunal Stone MD, Stopped at 03/22/24 1700    [START ON 3/24/2024] pantoprazole (ProtoNix) EC tablet 40 mg, 40 mg, oral, Daily before breakfast, Kary Garza MD    potassium chloride CR (Klor-Con) ER tablet 10 mEq, 10 mEq, oral, Daily, Kary Garza MD    prochlorperazine (Compazine) tablet 10 mg, 10 mg, oral, q6h PRN **OR** prochlorperazine (Compazine) injection 10 mg, 10 mg, intravenous, q6h PRN **OR** prochlorperazine (Compazine) suppository 25 mg, 25 mg, rectal, q12h PRN, Krunal Stone MD      Assessment/Plan   -Doing well postop right total knee replacement.  Pain control is good.  -COPD-nebulized treatments as needed.  -Memory impairment-patient will be going to short-term rehab to help her to her postop exercises  -History of Villeda's esophagus-continue her PPI  -CAD-continuing her Imdur, aspirin, metoprolol tartrate.  -Hyperlipidemia-atorvastatin was discontinued last  admission due to concerns about possible contribution to her memory impairment  -Grief reaction (after losing her  last year)-continue mirtazapine at bedtime    Kary Garza MD

## 2024-03-24 VITALS
WEIGHT: 179.9 LBS | SYSTOLIC BLOOD PRESSURE: 123 MMHG | BODY MASS INDEX: 30.71 KG/M2 | RESPIRATION RATE: 20 BRPM | TEMPERATURE: 99.5 F | DIASTOLIC BLOOD PRESSURE: 84 MMHG | HEIGHT: 64 IN | HEART RATE: 70 BPM | OXYGEN SATURATION: 94 %

## 2024-03-24 LAB
ANION GAP SERPL CALC-SCNC: 10 MMOL/L (ref 10–20)
BUN SERPL-MCNC: 13 MG/DL (ref 6–23)
CALCIUM SERPL-MCNC: 7.9 MG/DL (ref 8.6–10.3)
CHLORIDE SERPL-SCNC: 98 MMOL/L (ref 98–107)
CO2 SERPL-SCNC: 27 MMOL/L (ref 21–32)
CREAT SERPL-MCNC: 0.69 MG/DL (ref 0.5–1.05)
EGFRCR SERPLBLD CKD-EPI 2021: 88 ML/MIN/1.73M*2
ERYTHROCYTE [DISTWIDTH] IN BLOOD BY AUTOMATED COUNT: 15.2 % (ref 11.5–14.5)
GLUCOSE SERPL-MCNC: 119 MG/DL (ref 74–99)
HCT VFR BLD AUTO: 30.5 % (ref 36–46)
HGB BLD-MCNC: 9.7 G/DL (ref 12–16)
MAGNESIUM SERPL-MCNC: 1.92 MG/DL (ref 1.6–2.4)
MCH RBC QN AUTO: 28.8 PG (ref 26–34)
MCHC RBC AUTO-ENTMCNC: 31.8 G/DL (ref 32–36)
MCV RBC AUTO: 91 FL (ref 80–100)
NRBC BLD-RTO: 0 /100 WBCS (ref 0–0)
PLATELET # BLD AUTO: 292 X10*3/UL (ref 150–450)
POTASSIUM SERPL-SCNC: 4.1 MMOL/L (ref 3.5–5.3)
RBC # BLD AUTO: 3.37 X10*6/UL (ref 4–5.2)
SODIUM SERPL-SCNC: 131 MMOL/L (ref 136–145)
WBC # BLD AUTO: 9.2 X10*3/UL (ref 4.4–11.3)

## 2024-03-24 PROCEDURE — 85027 COMPLETE CBC AUTOMATED: CPT | Performed by: INTERNAL MEDICINE

## 2024-03-24 PROCEDURE — 99232 SBSQ HOSP IP/OBS MODERATE 35: CPT | Performed by: INTERNAL MEDICINE

## 2024-03-24 PROCEDURE — 80048 BASIC METABOLIC PNL TOTAL CA: CPT | Performed by: INTERNAL MEDICINE

## 2024-03-24 PROCEDURE — 36415 COLL VENOUS BLD VENIPUNCTURE: CPT | Performed by: INTERNAL MEDICINE

## 2024-03-24 PROCEDURE — 2500000001 HC RX 250 WO HCPCS SELF ADMINISTERED DRUGS (ALT 637 FOR MEDICARE OP): Performed by: ORTHOPAEDIC SURGERY

## 2024-03-24 PROCEDURE — 97530 THERAPEUTIC ACTIVITIES: CPT | Mod: GP,CQ

## 2024-03-24 PROCEDURE — 2500000002 HC RX 250 W HCPCS SELF ADMINISTERED DRUGS (ALT 637 FOR MEDICARE OP, ALT 636 FOR OP/ED): Mod: MUE | Performed by: INTERNAL MEDICINE

## 2024-03-24 PROCEDURE — 2500000001 HC RX 250 WO HCPCS SELF ADMINISTERED DRUGS (ALT 637 FOR MEDICARE OP): Performed by: INTERNAL MEDICINE

## 2024-03-24 PROCEDURE — 7100000011 HC EXTENDED STAY RECOVERY HOURLY - NURSING UNIT

## 2024-03-24 PROCEDURE — 2500000004 HC RX 250 GENERAL PHARMACY W/ HCPCS (ALT 636 FOR OP/ED): Performed by: INTERNAL MEDICINE

## 2024-03-24 PROCEDURE — 83735 ASSAY OF MAGNESIUM: CPT | Performed by: INTERNAL MEDICINE

## 2024-03-24 RX ADMIN — PANTOPRAZOLE SODIUM 40 MG: 40 TABLET, DELAYED RELEASE ORAL at 06:20

## 2024-03-24 RX ADMIN — OXYCODONE HYDROCHLORIDE 10 MG: 5 TABLET ORAL at 01:13

## 2024-03-24 RX ADMIN — ACETAMINOPHEN 650 MG: 325 TABLET ORAL at 12:04

## 2024-03-24 RX ADMIN — MAGNESIUM OXIDE 400 MG (241.3 MG MAGNESIUM) TABLET 400 MG: TABLET at 08:54

## 2024-03-24 RX ADMIN — ACETAMINOPHEN 650 MG: 325 TABLET ORAL at 06:20

## 2024-03-24 RX ADMIN — HYDROCHLOROTHIAZIDE 12.5 MG: 25 TABLET ORAL at 08:54

## 2024-03-24 RX ADMIN — ISOSORBIDE MONONITRATE 30 MG: 30 TABLET, EXTENDED RELEASE ORAL at 08:53

## 2024-03-24 RX ADMIN — ASPIRIN 81 MG: 81 TABLET, COATED ORAL at 08:54

## 2024-03-24 RX ADMIN — POTASSIUM CHLORIDE 10 MEQ: 750 TABLET, EXTENDED RELEASE ORAL at 08:53

## 2024-03-24 RX ADMIN — METOPROLOL TARTRATE 25 MG: 25 TABLET, FILM COATED ORAL at 08:54

## 2024-03-24 RX ADMIN — OXYCODONE HYDROCHLORIDE 10 MG: 5 TABLET ORAL at 13:48

## 2024-03-24 ASSESSMENT — PAIN DESCRIPTION - ORIENTATION: ORIENTATION: RIGHT

## 2024-03-24 ASSESSMENT — PAIN DESCRIPTION - LOCATION: LOCATION: KNEE

## 2024-03-24 ASSESSMENT — COGNITIVE AND FUNCTIONAL STATUS - GENERAL
MOBILITY SCORE: 14
MOVING FROM LYING ON BACK TO SITTING ON SIDE OF FLAT BED WITH BEDRAILS: A LOT
STANDING UP FROM CHAIR USING ARMS: A LOT
MOVING TO AND FROM BED TO CHAIR: A LITTLE
CLIMB 3 TO 5 STEPS WITH RAILING: A LOT
WALKING IN HOSPITAL ROOM: A LITTLE
TURNING FROM BACK TO SIDE WHILE IN FLAT BAD: A LOT

## 2024-03-24 ASSESSMENT — PAIN SCALES - GENERAL
PAINLEVEL_OUTOF10: 9
PAINLEVEL_OUTOF10: 9

## 2024-03-24 ASSESSMENT — PAIN - FUNCTIONAL ASSESSMENT
PAIN_FUNCTIONAL_ASSESSMENT: 0-10
PAIN_FUNCTIONAL_ASSESSMENT: 0-10

## 2024-03-24 NOTE — PROGRESS NOTES
"Rae Lundy is a 79 y.o. female on day 0 of admission presenting with Unilateral primary osteoarthritis, right knee.      ASSESSMENT/PLAN   -Acute delirium in patient with underlying dementia.  -COPD-nebulized treatments as needed.  -Memory impairment-patient will be going to short-term rehab to help her to her postop exercises  -History of Villeda's esophagus-continue her PPI  -CAD-on Imdur, aspirin, metoprolol tartrate.  -Hyperlipidemia-atorvastatin was discontinued last admission due to concerns about possible contribution to her memory impairment  -Grief reaction (after losing her  last year)-onmirtazapine at bedtime    SUBJECTIVE/OBJECTIVE   03/24/24   -\"I am doing better than an hour ago\".  She tells me she realizes she was very confused earlier this morning, but better now.  I did discuss with her delirium, particularly in the setting of her recent room change, surgery, and medications.  She understands.  -Per nurse's patient was extremely confused yesterday and earlier this morning, called her family and asked them all to come in, etc.  -Denies any shortness of breath, no nausea, no vomiting.  Pain control is good.  -She is afebrile, vital signs stable.  Satting 92-96% on O2 at 2L/NC.  -Chest is clear to auscultation  -Cardiac exam is a regular rhythm  -Chemistry panel with a sodium of 131, all electrolytes otherwise normal.  -BUN 13 with a creatinine of 0.69.  -CBC with a WBC of 9.2, H/H of 9.7/30.5.  Platelet count 292 K.  -Her son, Eleazar, is in.  We discussed her delirium-she has had it in the past.  She told her son yesterday that she was not in the hospital and had not had surgery on her knee.  We discussed that her delirium may worsen again when she goes to rehab due to the change in surroundings, he understands (and the patient says she understands)  -Okay to discharge to rehab from medicine standpoint    *These notes are being done using Dragon voice recognition technology and may include " unintended errors with respect to translation of words, typographical errors or grammar errors which may not have been identified prior to finalization of the chart note.    CURRENT MEDICATIONS     Scheduled Medications:    Current Facility-Administered Medications:     acetaminophen (Tylenol) tablet 650 mg, 650 mg, oral, q6h SAMIA, Krunal Stone MD, 650 mg at 03/24/24 0620    alendronate (Fosamax) tablet 70 mg, 70 mg, oral, Weekly, Kary Garza MD    aspirin EC tablet 81 mg, 81 mg, oral, BID, Krunal Stone MD, 81 mg at 03/23/24 2031    benzocaine-menthol (Cepastat Sore Throat) 15-3.6 mg lozenge 1 lozenge, 1 lozenge, Mouth/Throat, q4h PRN, Krunal Stone MD    bisacodyl (Dulcolax) EC tablet 10 mg, 10 mg, oral, Daily PRN, Krunal Stone MD    cyclobenzaprine (Flexeril) tablet 10 mg, 10 mg, oral, TID PRN, Krunal Stone MD, 10 mg at 03/23/24 2138    diphenhydrAMINE (BENADryl) liquid 12.5 mg, 12.5 mg, oral, q6h PRN, Krunal Stone MD    docusate sodium (Colace) capsule 100 mg, 100 mg, oral, BID, Krunal Stone MD, 100 mg at 03/23/24 2031    ergocalciferol (Vitamin D-2) capsule 1,250 mcg, 1,250 mcg, oral, Weekly, Kary Garza MD    hydroCHLOROthiazide (HYDRODiuril) tablet 12.5 mg, 12.5 mg, oral, Daily, Kary Garza MD, 12.5 mg at 03/23/24 1307    HYDROmorphone (Dilaudid) injection 0.5 mg, 0.5 mg, intravenous, q4h PRN, Krunal Stone MD    isosorbide mononitrate ER (Imdur) 24 hr tablet 30 mg, 30 mg, oral, Daily, Kary Garza MD, 30 mg at 03/23/24 2031    magnesium oxide (Mag-Ox) tablet 400 mg, 400 mg, oral, Daily, Kary Garza MD, 400 mg at 03/23/24 1307    metoprolol tartrate (Lopressor) tablet 25 mg, 25 mg, oral, BID, Kary Garza MD, 25 mg at 03/23/24 1307    mirtazapine (Remeron) tablet 15 mg, 15 mg, oral, Nightly, Kary Garza MD, 15 mg at 03/23/24 2031    montelukast (Singulair) tablet 10 mg, 10 mg, oral, Nightly, Kary Garza MD, 10 mg at 03/23/24 2031    morphine injection  "2 mg, 2 mg, intravenous, q2h PRN, Krunal Stone MD    nitroglycerin (Nitrostat) SL tablet 0.4 mg, 0.4 mg, sublingual, q5 min PRN, Kary Garza MD    ondansetron ODT (Zofran-ODT) disintegrating tablet 4 mg, 4 mg, oral, q8h PRN **OR** ondansetron (Zofran) injection 4 mg, 4 mg, intravenous, q8h PRN, Krunal Stone MD    oxyCODONE (Roxicodone) immediate release tablet 10 mg, 10 mg, oral, q4h PRN, Krunal Stone MD, 10 mg at 03/24/24 0113    oxyCODONE (Roxicodone) immediate release tablet 5 mg, 5 mg, oral, q4h PRN, Krunal Stone MD, 5 mg at 03/23/24 1311    oxygen (O2) therapy, 2 L/min, inhalation, Continuous, Krunal Stone MD, Stopped at 03/22/24 1700    pantoprazole (ProtoNix) EC tablet 40 mg, 40 mg, oral, Daily before breakfast, Kary Garza MD, 40 mg at 03/24/24 0620    potassium chloride CR (Klor-Con) ER tablet 10 mEq, 10 mEq, oral, Daily, Kary Garza MD, 10 mEq at 03/23/24 1307    prochlorperazine (Compazine) tablet 10 mg, 10 mg, oral, q6h PRN **OR** prochlorperazine (Compazine) injection 10 mg, 10 mg, intravenous, q6h PRN **OR** prochlorperazine (Compazine) suppository 25 mg, 25 mg, rectal, q12h PRN, Krunal Stone MD     PRN Medications:  PRN medications   Medication    benzocaine-menthol    bisacodyl    cyclobenzaprine    diphenhydrAMINE    HYDROmorphone    morphine    nitroglycerin    ondansetron ODT    Or    ondansetron    oxyCODONE    oxyCODONE    prochlorperazine    Or    prochlorperazine    Or    prochlorperazine         IVs:  oxygen, 2 L/min, Last Rate: Stopped (03/22/24 1700)         I&Os     Intake/Output last 3 Shifts:  I/O last 3 completed shifts:  In: 440 (5.4 mL/kg) [P.O.:340; IV Piggyback:100]  Out: 1 (0 mL/kg) [Stool:1]  Weight: 81.6 kg     VITAL SIGNS     Blood pressure 129/61, pulse 80, temperature 37.3 °C (99.1 °F), resp. rate 22, height 1.626 m (5' 4\"), weight 81.6 kg (179 lb 14.3 oz), SpO2 90 %.     PHYSICAL EXAM   Physical exam:  -General appearance: " Pleasant elderly white female, sitting up in bed, presently alert but very aware that she was confused earlier.  She is still mildly confused  -Vital signs:  As above  -HEENT: No icterus  -Neck: No JVD  -Chest: Lungs are clear  -Cardiac: Regular rhythm  -Abdomen: Bowel sounds active  -Extremities: Dressings in place  -Skin: No rash  -Neurologic:   Patient is alert and oriented x 1+, did know she was in the hospital.    -Behavior/Emotional:  Appropriate, cooperative    LABS   Relevant Results:  Results from last 7 days   Lab Units 03/24/24  0523 03/23/24  0730   GLUCOSE mg/dL 119* 101*      HbA1c:    Lab Results   Component Value Date    HGBA1C 6.4 (H) 03/08/2024     CBC:   Results from last 7 days   Lab Units 03/24/24  0523 03/23/24  0730   WBC AUTO x10*3/uL 9.2 6.2   RBC AUTO x10*6/uL 3.37* 3.64*   HEMOGLOBIN g/dL 9.7* 10.5*   HEMATOCRIT % 30.5* 33.2*   MCV fL 91 91   MCH pg 28.8 28.8   MCHC g/dL 31.8* 31.6*   RDW % 15.2* 15.3*   PLATELETS AUTO x10*3/uL 292 273     CMP:    Results from last 7 days   Lab Units 03/24/24  0523 03/23/24  0730   SODIUM mmol/L 131* 137   POTASSIUM mmol/L 4.1 4.3   CHLORIDE mmol/L 98 104   CO2 mmol/L 27 27   BUN mg/dL 13 11   CREATININE mg/dL 0.69 0.69   GLUCOSE mg/dL 119* 101*   CALCIUM mg/dL 7.9* 8.0*     Magnesium:  Results from last 7 days   Lab Units 03/24/24  0523   MAGNESIUM mg/dL 1.92         IMAGING     XR knee right 1-2 views   Final Result   Status post right total knee arthroplasty.        MACRO:   None        Signed by: Lacy Enrique 3/22/2024 4:36 PM   Dictation workstation:   [id number]           Kary Garza MD

## 2024-03-24 NOTE — CARE PLAN
The patient's goals for the shift include      The clinical goals for the shift include increase walking

## 2024-03-24 NOTE — PROGRESS NOTES
Physical Therapy    Physical Therapy Treatment    Patient Name: Rae Lundy  MRN: [id number]  Today's Date: 3/24/2024  Time Calculation  Start Time: 1056  Stop Time: 1140  Time Calculation (min): 44 min       Assessment/Plan   PT Assessment  PT Assessment Results: Decreased strength, Decreased range of motion, Decreased mobility, Decreased safety awareness, Obesity, Pain  Rehab Prognosis: Good  Barriers to Discharge: Pt. lives alone and has h/o dementia  Treatment Tolerance: Patient tolerated treatment well, Patient limited by fatigue, Patient limited by pain  Medical Staff Made Aware: Yes  End of Session Communication: Bedside nurse  Assessment Comment: Patient continues to require (A) for safety with transfers/amb. Patient will benefit from additional PT to address deficits and improve mobility.  End of Session Patient Position: Up in chair (Reclined in chair with LEs elevated; Ice pack on (R)knee; Set up with lunch that arrived during session.)  PT Plan  Inpatient/Swing Bed or Outpatient: Inpatient  Treatment/Interventions: Transfer training, Gait training, Range of motion, Therapeutic exercise, Home exercise program  PT Plan: Skilled PT  PT Frequency: BID  PT Discharge Recommendations: Moderate intensity level of continued care  Equipment Recommended upon Discharge: Wheeled walker   PT Recommended Transfer Status: Assist x1, Assistive device    General Visit Information:   PT  Visit  PT Received On: 03/24/24  General  Family/Caregiver Present: No  Prior to Session Communication: Bedside nurse, PCT/NA/CTA  Patient Position Received: Bed, 3 rail up, Alarm on  General Comment: Pleasant and cooperative, but had some intermittent confusion noted with conversation.    General Observations:   General Observation: 2L O2 via NC; no tele.    Subjective     Precautions:  Precautions  LE Weight Bearing Status: Weight Bearing as Tolerated  Medical Precautions: Fall precautions  Post-Surgical Precautions: Right total knee  precautions  Precautions Comment: (R)TKA DANE---(03/22/24 [place]); R knee immobilizer until +SLR and/or no sign of buckling    Vital Signs:  Vital Signs  Heart Rate:  (73-79bpm during session.)  SpO2:  (2L O2 via NC. SPO2 92% at rest. Trial on RA 2° patient only wearing O2 at night when home. SPO2 85% with activities on RA; (+)LOYD. Reapplied NC(2L) and SPO2 was 93% with continued activity. Nursing present and aware of readings.)    Objective     Pain:  Pain Assessment  Pain Assessment: 0-10  Pain Score: 9 (Nursing aware of pain rating; patient able to have pain medicine. Ice pack provided for (R)knee at end of session.)  Pain Type: Surgical pain  Pain Location: Knee  Pain Orientation: Right    Cognition:  Cognition  Orientation Level: Disoriented to time, Disoriented to situation (Reorientation provided. Confusion noted with conversation when patient was asking about picking up the pain medicine from the  and when will they bring her the bill.)  Processing Speed: Delayed    Balance:   Static Sitting: G  Dynamic Sitting: G-  Static Standing: F- with ww  Dynamic Standing: F-/P+ with ww    Extremity/Trunk Assessments:  RLE :  (5°- 64° and (+)SLR)    Treatments:        Balance/Neuromuscular Re-Education  Balance/Neuromuscular Re-Education Activity Performed: Yes  Balance/Neuromuscular Re-Education Activity 1: Multi-level dynamic reaching seated/standing positions (L/R/C/Below waist/Across midline) with x1UE support. SBA with dynamic sitting and Min/Mod(A) with dynamic standing. v/c and (A) to correct posture and balance.  Bed Mobility  Bed Mobility: Yes  Bed Mobility 1  Bed Mobility 1: Supine to sitting  Level of Assistance 1: Minimum assistance  Bed Mobility Comments 1: HOB ~40°. v/c to reach across body to use the bed rail for support. (A) with moving LEs over the EOB. Slow with transition 2° c/o (R)knee pain.  Ambulation/Gait Training  Ambulation/Gait Training Performed: Yes  Ambulation/Gait Training  1  Surface 1: Level tile  Device 1: Rolling walker  Gait Support Devices: Gait belt  Assistance 1: Minimum assistance (+1 additional staff member with chair follow for safety.)  Comments/Distance (ft) 1: ~15ft x1 and ~3ft x1. WBOS. Slow tyron. Forward flexed posture; v/c and (A) to keep upright posture and to keep ww closer to her body for proper support. Patient not able to advance (L)LE beyond her (R)LE placement 2° c/o pain with increased WB. No buckling of (R)knee with WB. Shuffled gait; v/c to avoid twistin/pivoting on (R)LE with directional changes. v/c and (A) for safer maneuvering of ww with amb/transfers. Fatigues quickly with activities. Chair follow with amb for safety.  Transfers  Transfer: Yes  Transfer 1  Technique 1: Sit to stand, Stand to sit  Transfer Device 1: Gait belt (ww)  Transfer Level of Assistance 1: Moderate assistance  Trials/Comments 1: (4x). v/c for safe hand placement and technique. Slow transition of hands to/from ww. Benefits from elevated surfaces. v/c and (A) for keeping (R)LE out in front when completing transfers.          Outcome Measures:  [facility] Basic Mobility  Turning from your back to your side while in a flat bed without using bedrails: A lot  Moving from lying on your back to sitting on the side of a flat bed without using bedrails: A lot  Moving to and from bed to chair (including a wheelchair): A little  Standing up from a chair using your arms (e.g. wheelchair or bedside chair): A lot  To walk in hospital room: A little  Climbing 3-5 steps with railing: A lot  Basic Mobility - Total Score: 14    Education Documentation  Mobility Training, taught by Danita Carrillo PTA at 3/24/2024 12:47 PM.  Learner: Patient  Readiness: Acceptance  Method: Explanation, Demonstration  Response: Verbalizes Understanding, Needs Reinforcement  Comment: See therapy note.    EDUCATION:  Individual(s) Educated: Patient  Education Provided: Body Mechanics, Fall Risk, Home Safety, POC, Posture,  Post-Op Precautions (Safety with bed mobility/transfers/amb; Proper use of ww for support with transfers/amb.)  Patient Response to Education: Patient/Caregiver Verbalized Understanding of Information    Encounter Problems       Encounter Problems (Active)       Impaired mobility s/p TKA        Perform all bed mobility with indep.  (Progressing)       Start:  03/23/24    Expected End:  05/04/24            Perform all transfers with ww and SBA (Progressing)       Start:  03/23/24    Expected End:  05/04/24            Patient will ambulate >/= 50 ft. with ww and SBA (Progressing)       Start:  03/23/24    Expected End:  05/04/24            Patient will perform LE HEP with supervision  (Progressing)       Start:  03/23/24    Expected End:  05/04/24            Patient will achieve 0-90 degrees right knee ROM  (Progressing)       Start:  03/23/24    Expected End:  05/04/24

## 2024-03-24 NOTE — PROGRESS NOTES
Social Work Note Per attending, pt to discharge to SNF today.  Transportation via ambulance (2L 02, confused, fall risk) arranged for 12:30PM pickup by [facility].  [facility] to submit PASSR and will sent gold form and AVS.  Provided RN with number for report.  Per RN, family at bedside and aware of discharge to SNF today.  Advised facility of transport time.  YISEL Urrutia

## 2024-03-24 NOTE — NURSING NOTE
Pt discharged to [facility]. All belongings sent. Discharge paperwork including paper prescription for pain medication sent with [facility]. IV removed prior to discharge.

## 2024-03-24 NOTE — PROGRESS NOTES
Progress Note   TKA    Subjective:     Post-Operative Day: 2 Status Post right Total Knee Arthroplasty  Systemic or Specific Complaints:No Complaints    Objective:     Visit Vitals  /72   Pulse 71   Temp 36.8 °C (98.2 °F) (Temporal)   Resp 19       General: alert, appears stated age, and cooperative   Wound: wound clean and dry no evidence of infection   Motion: Painful range of Motion   DVT Exam: No evidence of DVT seen on physical exam.       Knee swollen but thigh soft to palpation. Moving foot and ankle. Good distal pulses.      Data Review  Recent Results (from the past 24 hour(s))   CBC    Collection Time: 03/23/24  7:30 AM   Result Value Ref Range    WBC 6.2 4.4 - 11.3 x10*3/uL    nRBC 0.0 0.0 - 0.0 /100 WBCs    RBC 3.64 (L) 4.00 - 5.20 x10*6/uL    Hemoglobin 10.5 (L) 12.0 - 16.0 g/dL    Hematocrit 33.2 (L) 36.0 - 46.0 %    MCV 91 80 - 100 fL    MCH 28.8 26.0 - 34.0 pg    MCHC 31.6 (L) 32.0 - 36.0 g/dL    RDW 15.3 (H) 11.5 - 14.5 %    Platelets 273 150 - 450 x10*3/uL   Basic metabolic panel    Collection Time: 03/23/24  7:30 AM   Result Value Ref Range    Glucose 101 (H) 74 - 99 mg/dL    Sodium 137 136 - 145 mmol/L    Potassium 4.3 3.5 - 5.3 mmol/L    Chloride 104 98 - 107 mmol/L    Bicarbonate 27 21 - 32 mmol/L    Anion Gap 10 10 - 20 mmol/L    Urea Nitrogen 11 6 - 23 mg/dL    Creatinine 0.69 0.50 - 1.05 mg/dL    eGFR 88 >60 mL/min/1.73m*2    Calcium 8.0 (L) 8.6 - 10.3 mg/dL   Basic Metabolic Panel    Collection Time: 03/24/24  5:23 AM   Result Value Ref Range    Glucose 119 (H) 74 - 99 mg/dL    Sodium 131 (L) 136 - 145 mmol/L    Potassium 4.1 3.5 - 5.3 mmol/L    Chloride 98 98 - 107 mmol/L    Bicarbonate 27 21 - 32 mmol/L    Anion Gap 10 10 - 20 mmol/L    Urea Nitrogen 13 6 - 23 mg/dL    Creatinine 0.69 0.50 - 1.05 mg/dL    eGFR 88 >60 mL/min/1.73m*2    Calcium 7.9 (L) 8.6 - 10.3 mg/dL   Magnesium    Collection Time: 03/24/24  5:23 AM   Result Value Ref Range    Magnesium 1.92 1.60 - 2.40 mg/dL    CBC    Collection Time: 03/24/24  5:23 AM   Result Value Ref Range    WBC 9.2 4.4 - 11.3 x10*3/uL    nRBC 0.0 0.0 - 0.0 /100 WBCs    RBC 3.37 (L) 4.00 - 5.20 x10*6/uL    Hemoglobin 9.7 (L) 12.0 - 16.0 g/dL    Hematocrit 30.5 (L) 36.0 - 46.0 %    MCV 91 80 - 100 fL    MCH 28.8 26.0 - 34.0 pg    MCHC 31.8 (L) 32.0 - 36.0 g/dL    RDW 15.2 (H) 11.5 - 14.5 %    Platelets 292 150 - 450 x10*3/uL         Assessment:     Status Post right Total Knee Arthroplasty. Doing well postoperatively.     Plan:      1: Continues current post-op course :  2:  Continue Deep venous thrombosis prophylaxis  3:  Continue physical therapy  4:  Continue Pain Control    Patient plan is discharged to rehab today follow-up with me in the office in 2 weeks.

## 2024-03-25 ENCOUNTER — NURSING HOME VISIT (OUTPATIENT)
Dept: POST ACUTE CARE | Facility: EXTERNAL LOCATION | Age: 79
End: 2024-03-25
Payer: MEDICARE

## 2024-03-25 VITALS — SYSTOLIC BLOOD PRESSURE: 134 MMHG | DIASTOLIC BLOOD PRESSURE: 78 MMHG | HEART RATE: 76 BPM

## 2024-03-25 DIAGNOSIS — J44.9 CHRONIC OBSTRUCTIVE PULMONARY DISEASE, UNSPECIFIED COPD TYPE (MULTI): ICD-10-CM

## 2024-03-25 DIAGNOSIS — Z96.651 S/P TOTAL KNEE REPLACEMENT, RIGHT: Primary | ICD-10-CM

## 2024-03-25 DIAGNOSIS — I48.0 PAROXYSMAL ATRIAL FIBRILLATION (MULTI): ICD-10-CM

## 2024-03-25 DIAGNOSIS — F03.A0 MILD DEMENTIA, UNSPECIFIED DEMENTIA TYPE, UNSPECIFIED WHETHER BEHAVIORAL, PSYCHOTIC, OR MOOD DISTURBANCE OR ANXIETY (MULTI): ICD-10-CM

## 2024-03-25 DIAGNOSIS — I25.10 CAD S/P PERCUTANEOUS CORONARY ANGIOPLASTY: ICD-10-CM

## 2024-03-25 DIAGNOSIS — Z95.818 STATUS POST PLACEMENT OF IMPLANTABLE LOOP RECORDER: ICD-10-CM

## 2024-03-25 DIAGNOSIS — Z98.61 CAD S/P PERCUTANEOUS CORONARY ANGIOPLASTY: ICD-10-CM

## 2024-03-25 DIAGNOSIS — I10 BENIGN ESSENTIAL HYPERTENSION: ICD-10-CM

## 2024-03-25 PROCEDURE — 99306 1ST NF CARE HIGH MDM 50: CPT | Performed by: INTERNAL MEDICINE

## 2024-03-25 ASSESSMENT — ENCOUNTER SYMPTOMS
APNEA: 0
ABDOMINAL PAIN: 0
WEAKNESS: 1
COUGH: 0
FREQUENCY: 0
AGITATION: 0
CHOKING: 0
APPETITE CHANGE: 0
DIFFICULTY URINATING: 0
WOUND: 1
CONFUSION: 1
ACTIVITY CHANGE: 1
NAUSEA: 0
ABDOMINAL DISTENTION: 0
JOINT SWELLING: 0
SHORTNESS OF BREATH: 1
CONSTIPATION: 1
NERVOUS/ANXIOUS: 0
FATIGUE: 0
ARTHRALGIAS: 1

## 2024-03-25 NOTE — DISCHARGE SUMMARY
Discharge summary  This patient Rae Lundy was admitted to the hospital on 3/22/2024  after undergoing Procedure(s) (LRB):  Arthroplasty Total Knee Robot-Assisted, MARK DANE ALL POLY TIBIA, NERVE BLOCK has an event recorder (Right) without complications that morning.    During the postoperative period,while in hospital, patient was medically managed by the hospitalist. Please see medial notes and H&P for patients additional diagnoses.  Ortho agrees with all medical diagnoses and treatments while patient in hospital.  No significant or unexpected findings or abnormal ortho imaging were noted during the hospital stay    Hospital course      Patient tolerated surgical procedure well and there was no complications. Patient progressed adequately through their recovery during hospital stay including PT and rehabilitation.    Patient was then D/C on 3/24/2024 to SNF  in stable condition.  Patient was instructed on the use of pain medications, the signs and symptoms of infection, and was given our number to call should they have any questions or concerns following discharge.    Based on my clinical judgment, the patient was provided with a 7-day prescription for opioid medication at 30 MED, indicated for treatment of acute pain in the setting of recent surgery. OARRS report was run and has demonstrated an appropriate time course.  The patient has been provided with counseling pertaining to safe use of opioid medication.      Patient may WBAT to operative extremity with use of walker for assistance with ambulation   Aquacel dressing to be removed pod7 and incision left open to air  ASA 81 mg BID for DVT prophylaxis started on 3/22/24 and to be taken for 30 days  Follow up with surgeon in 2 weeks

## 2024-03-25 NOTE — LETTER
Patient: Rae Lundy  : 1945    Encounter Date: 2024    Subjective  Patient ID: Rae Lundy is a 79 y.o. female who is acute skilled care and presents for initial visit for skilled nursing.    79-year-old female patient has been admitted after having total knee replacement.  Surgery happened last Monday, postoperatively patient developed delirium, hypoxemia, patient has a loop recorder, patient has history of paroxysmal atrial fibrillation, patient was kept on a close monitoring after joint replacement.  Delirium gradually improved, patient has underlying cognitive impairment.  Patient also has been a former smoker has been on on nasal oxygen at home, patient was having wheezing and shortness of breath reactive to bronchospasm was done, there is a history of COPD, coronary artery disease, diabetes mellitus.  Last November she lost her  she was going through some grievance through that.  Patient has is robotic assisted total knee replacement.  Postoperative laboratories were reviewed, all other events and medical consultations were reviewed.  Hemoglobin was 9.7 at the time of discharge.  Today morning when I saw this patient she was not fully oriented she just woke up, she has a Aquacel dressing on the site of knee surgery.         Review of Systems   Constitutional:  Positive for activity change. Negative for appetite change and fatigue.   HENT:  Negative for drooling.    Eyes:  Negative for visual disturbance.   Respiratory:  Positive for shortness of breath. Negative for apnea, cough and choking.    Cardiovascular:  Positive for leg swelling. Negative for chest pain.   Gastrointestinal:  Positive for constipation. Negative for abdominal distention, abdominal pain and nausea.   Genitourinary:  Negative for difficulty urinating and frequency.   Musculoskeletal:  Positive for arthralgias. Negative for gait problem and joint swelling.   Skin:  Positive for wound.   Neurological:  Positive for  weakness.   Psychiatric/Behavioral:  Positive for confusion. Negative for agitation and behavioral problems. The patient is not nervous/anxious.        Objective  /78   Pulse 76     Physical Exam  Constitutional:       Appearance: Normal appearance. She is overweight.   HENT:      Head: Normocephalic.   Eyes:      Conjunctiva/sclera: Conjunctivae normal.   Cardiovascular:      Rate and Rhythm: Normal rate and regular rhythm.      Pulses: Normal pulses.      Heart sounds: Normal heart sounds.   Pulmonary:      Effort: Pulmonary effort is normal.      Breath sounds: Rales present. No wheezing.   Abdominal:      Palpations: Abdomen is soft.   Musculoskeletal:         General: Swelling and tenderness present.      Cervical back: Neck supple.   Skin:     General: Skin is warm and dry.      Coloration: Skin is not pale.      Findings: Wound present.   Neurological:      Mental Status: Mental status is at baseline. She is disoriented.   Psychiatric:         Behavior: Behavior normal.         Assessment/Plan  Problem List Items Addressed This Visit             ICD-10-CM    Benign essential hypertension I10    CAD S/P percutaneous coronary angioplasty I25.10, Z98.61    COPD (chronic obstructive pulmonary disease) (CMS/[facility]) J44.9    Dementia (CMS/[facility]) F03.90    Paroxysmal atrial fibrillation (CMS/[facility]) I48.0    Status post placement of implantable loop recorder Z95.818    S/P total knee replacement, right - Primary Z96.651   Patient's knee was inspected, usual postoperative findings are there.  Patient is listed to be on alendronate, aspirin, Flexeril as needed, iron which will be changed to every other day, hydrochlorothiazide, ibuprofen which will be changed, isosorbide, metoprolol 25 mg twice a day, mirtazapine, Singulair, omeprazole, potassium chloride 10 mEq every day, oxycodone.  Aspirin will serve as her DVT prophylaxis.  Patient's mentation and delirium is much better, patient is not in any delirium.  There is a  history of paroxysmal atrial fibrillation but patient is not on any sort of regular anticoagulation.LoopRecorder interrogation checked by cardiology periodically, there is a history of CAD no angina, BP readings will be monitored.  Physical therapy, Occupational Therapy evaluation will be done today.  Other routine safety precautions has to be taken as per the facility standard of care orders.  If any confusion disorientation delirium or agitation noticed please inform us there is no breathing compromise, nasal oxygen to be continued, nebulizer can be used.  Laboratories will be done according to our routine.  Pain control with oxycodone.  Discussed with nursing staff, patient is expected to be here for short-term skilled nursing and rehabilitation as soon as range of motion improves and she can be able to perform transfer activities and mobility.  Expecting patient to be discharged after full course of her physical therapy occupational therapy will be completed depends upon patient's recovery and functional gain.  Discussed with nursing staff, periodic assessment and follow-up will be done.  Orders were reviewed and agreed upon.     Goals    None           Electronically Signed By: Ashwin Kraft MD   3/25/24 10:24 PM

## 2024-03-26 NOTE — PROGRESS NOTES
Subjective   Patient ID: Rae Lundy is a 79 y.o. female who is acute skilled care and presents for initial visit for skilled nursing.    79-year-old female patient has been admitted after having total knee replacement.  Surgery happened last Monday, postoperatively patient developed delirium, hypoxemia, patient has a loop recorder, patient has history of paroxysmal atrial fibrillation, patient was kept on a close monitoring after joint replacement.  Delirium gradually improved, patient has underlying cognitive impairment.  Patient also has been a former smoker has been on on nasal oxygen at home, patient was having wheezing and shortness of breath reactive to bronchospasm was done, there is a history of COPD, coronary artery disease, diabetes mellitus.  Last November she lost her  she was going through some grievance through that.  Patient has is robotic assisted total knee replacement.  Postoperative laboratories were reviewed, all other events and medical consultations were reviewed.  Hemoglobin was 9.7 at the time of discharge.  Today morning when I saw this patient she was not fully oriented she just woke up, she has a Aquacel dressing on the site of knee surgery.         Review of Systems   Constitutional:  Positive for activity change. Negative for appetite change and fatigue.   HENT:  Negative for drooling.    Eyes:  Negative for visual disturbance.   Respiratory:  Positive for shortness of breath. Negative for apnea, cough and choking.    Cardiovascular:  Positive for leg swelling. Negative for chest pain.   Gastrointestinal:  Positive for constipation. Negative for abdominal distention, abdominal pain and nausea.   Genitourinary:  Negative for difficulty urinating and frequency.   Musculoskeletal:  Positive for arthralgias. Negative for gait problem and joint swelling.   Skin:  Positive for wound.   Neurological:  Positive for weakness.   Psychiatric/Behavioral:  Positive for confusion. Negative  for agitation and behavioral problems. The patient is not nervous/anxious.        Objective   /78   Pulse 76     Physical Exam  Constitutional:       Appearance: Normal appearance. She is overweight.   HENT:      Head: Normocephalic.   Eyes:      Conjunctiva/sclera: Conjunctivae normal.   Cardiovascular:      Rate and Rhythm: Normal rate and regular rhythm.      Pulses: Normal pulses.      Heart sounds: Normal heart sounds.   Pulmonary:      Effort: Pulmonary effort is normal.      Breath sounds: Rales present. No wheezing.   Abdominal:      Palpations: Abdomen is soft.   Musculoskeletal:         General: Swelling and tenderness present.      Cervical back: Neck supple.   Skin:     General: Skin is warm and dry.      Coloration: Skin is not pale.      Findings: Wound present.   Neurological:      Mental Status: Mental status is at baseline. She is disoriented.   Psychiatric:         Behavior: Behavior normal.         Assessment/Plan   Problem List Items Addressed This Visit             ICD-10-CM    Benign essential hypertension I10    CAD S/P percutaneous coronary angioplasty I25.10, Z98.61    COPD (chronic obstructive pulmonary disease) (CMS/[facility]) J44.9    Dementia (CMS/[facility]) F03.90    Paroxysmal atrial fibrillation (CMS/[facility]) I48.0    Status post placement of implantable loop recorder Z95.818    S/P total knee replacement, right - Primary Z96.651   Patient's knee was inspected, usual postoperative findings are there.  Patient is listed to be on alendronate, aspirin, Flexeril as needed, iron which will be changed to every other day, hydrochlorothiazide, ibuprofen which will be changed, isosorbide, metoprolol 25 mg twice a day, mirtazapine, Singulair, omeprazole, potassium chloride 10 mEq every day, oxycodone.  Aspirin will serve as her DVT prophylaxis.  Patient's mentation and delirium is much better, patient is not in any delirium.  There is a history of paroxysmal atrial fibrillation but patient is not on any  sort of regular anticoagulation.LoopRecorder interrogation checked by cardiology periodically, there is a history of CAD no angina, BP readings will be monitored.  Physical therapy, Occupational Therapy evaluation will be done today.  Other routine safety precautions has to be taken as per the facility standard of care orders.  If any confusion disorientation delirium or agitation noticed please inform us there is no breathing compromise, nasal oxygen to be continued, nebulizer can be used.  Laboratories will be done according to our routine.  Pain control with oxycodone.  Discussed with nursing staff, patient is expected to be here for short-term skilled nursing and rehabilitation as soon as range of motion improves and she can be able to perform transfer activities and mobility.  Expecting patient to be discharged after full course of her physical therapy occupational therapy will be completed depends upon patient's recovery and functional gain.  Discussed with nursing staff, periodic assessment and follow-up will be done.  Orders were reviewed and agreed upon.     Goals    None

## 2024-03-28 ENCOUNTER — PATIENT OUTREACH (OUTPATIENT)
Dept: PRIMARY CARE | Facility: CLINIC | Age: 79
End: 2024-03-28
Payer: MEDICARE

## 2024-03-28 NOTE — PROGRESS NOTES
Currently at [facility] S/P total knee replacement on 3/22/24.  CM to close TCM program at this time.

## 2024-03-29 ENCOUNTER — NURSING HOME VISIT (OUTPATIENT)
Dept: POST ACUTE CARE | Facility: EXTERNAL LOCATION | Age: 79
End: 2024-03-29
Payer: MEDICARE

## 2024-03-29 DIAGNOSIS — Z99.81 HISTORY OF HOME OXYGEN THERAPY: ICD-10-CM

## 2024-03-29 DIAGNOSIS — J44.9 CHRONIC OBSTRUCTIVE PULMONARY DISEASE, UNSPECIFIED COPD TYPE (MULTI): ICD-10-CM

## 2024-03-29 DIAGNOSIS — Z96.651 S/P TOTAL KNEE REPLACEMENT, RIGHT: Primary | ICD-10-CM

## 2024-03-29 DIAGNOSIS — Z74.09 IMPAIRED FUNCTIONAL MOBILITY, BALANCE, GAIT, AND ENDURANCE: ICD-10-CM

## 2024-03-29 DIAGNOSIS — R52 PAIN: ICD-10-CM

## 2024-03-29 PROCEDURE — 99310 SBSQ NF CARE HIGH MDM 45: CPT | Performed by: PHYSICIAN ASSISTANT

## 2024-03-29 NOTE — LETTER
"Patient: Rae Lundy  : 1945    Encounter Date: 2024    3/29/2024  Name: Rae Lundy  YOB: 1945    Chief complaint: R total knee replacement.    HPI: This is a  79 year old  female who has a medical history remarkable for COPD, hyperlipidemia, dementia, chronic cough, CAD, pacemaker, paroxysmal atrial fibrillation, Villeda's esophagus, overactive bladder, iron deficiency anemia, OA of R knee. Patient had elective robot assist R total knee arthroplasty on 3/22/2024 with Dr. Stone. Post operatively, patient experienced delirium and hypoxia. Patient has history of demential and requires oxygen at home. Delirium improved and patient did well on supplemental oxygen at 2 liters. She was discharge to SNF for rehab.    Total Knee Follow-up: Patient here for 5 days post right total knee arthroplasty follow-up.  Pain is controlled on oxycodone.  The patient denies  fever, wound drainage, increasing redness, pus, increasing pain, increasing swelling, numbness . Post op problems reported:  none She is ambulating with walker..     Review of systems:   ROS negative except were noted in HPI.    Code Status: full code    /78   Pulse 70   Temp 36.3 °C (97.3 °F)   Resp 17   Ht 1.626 m (5' 4\")   Wt 83.9 kg (185 lb)   SpO2 95%   BMI 31.76 kg/m²      Physical Exam  Constitutional:       General: She is not in acute distress.  HENT:      Head: Normocephalic.      Nose: Nose normal.      Mouth/Throat:      Mouth: Mucous membranes are moist.   Eyes:      Extraocular Movements: Extraocular movements intact.      Pupils: Pupils are equal, round, and reactive to light.   Cardiovascular:      Rate and Rhythm: Normal rate and regular rhythm.      Pulses: Normal pulses.      Heart sounds: Normal heart sounds.      Comments: Pacemaker.  Pulmonary:      Effort: Pulmonary effort is normal.      Breath sounds: No wheezing.   Abdominal:      General: Bowel sounds are normal.      Palpations: " Abdomen is soft.      Tenderness: There is no abdominal tenderness. There is no guarding.   Genitourinary:     Comments: Voiding.  Musculoskeletal:         General: Tenderness present.      Cervical back: Normal range of motion.      Right lower leg: Edema present.   Lymphadenopathy:      Cervical: No cervical adenopathy.   Skin:     General: Skin is warm and dry.      Capillary Refill: Capillary refill takes less than 2 seconds.   Neurological:      General: No focal deficit present.      Mental Status: She is alert and oriented to person, place, and time.   Psychiatric:         Mood and Affect: Mood normal.         Behavior: Behavior normal.        Medications reviewed during visit at facility.  Cyclobenzaprine 5 mg po q 8 hours prn  Vitamin D 2 1.25 mg po q week  DuoNeb Solution 0.5-2.5 (3) MG/3ML (Ipratropium-Albuterol) daily  Colace 100 mg po bid  Montelukast Sodium Oral Tablet 10 MG po daily  Isosorbide Mononitrate ER Oral Tablet Extended Release 24 Hour 30 MG po daily  Omeprazole 20 mg po daily  Mirtazapine 15 mg po daily  Nitroglycerin 0.4 mg sl q 5 minutes x 3 prn  Mag Oxide 400 mg po daily  Aspirin 81 mg po bid  Metoprolol tartrate 25 mg po bid  Oxycodone 5 mg po q 6 hours prn  Benzonatate 200 mg po q 8 hours prn  Alendronate 70 mg po q week  Hydrochlorothiazide 12.5 mg po daily  Potassium chloride 10 meq po daily  Multivitamin one tablet po daily  Tylenol 650 mg po q 4 hours prn  MOM 30 ml po daily prn  Ferrous sulfate 325 mg po daily    Labs reviewed at facility:     Laboratory Service Report [id number]   Patient Name   MELANIE SMART  Patient ID   [id number]  Age   79 Y  Gender   F  Order #      Ordering SEBASTIÁN Aguirre  Patient Telephone #   N/A     1945  AKA      Client Order #   [id number]   Collection Date and Time   2024 06:32   Print Date and Time   2024 18:22  Account Information   [facility] [place]   [address] [place] [address]     Report  Notes                  Test Results   Reference Perform  Unit  Value Site*             Comp Metabolic Panel  REPORTED 03/28/2024 10:55  Protein, Total L 6.2 g/dL 6.3-8.0    Albumin L 3.5 g/dL 3.9-4.9    Calcium, Total   9.1 mg/dL 8.5-10.2    Bilirubin, Total   0.4 mg/dL 0.2-1.3    Alkaline Phosphatase   92 U/L     AST   20 U/L 13-35    ALT   11 U/L 7-38    Glucose H 101 mg/dL 74-99  BUN   8 mg/dL 7-21    Creatinine   0.65 mg/dL 0.58-0.96    Sodium   138 mmol/L 136-144    Potassium   4.4 mmol/L 3.7-5.1    Chloride   98 mmol/L     CO2   29 mmol/L 22-30    Anion Gap   11 mmol/L 9-18    Estimated Glomerular Filtration Rate   90 mL/min/1.73 meters squared >=60    Assessment/Plan   Problem List Items Addressed This Visit       COPD (chronic obstructive pulmonary disease) (CMS/[facility])     DuoNeb Solution 0.5-2.5 (3) MG/3ML (Ipratropium-Albuterol) daily. Montelukast Sodium Oral Tablet 10 MG po daily         History of home oxygen therapy     Oxygen at 2 liters per nasal cannula.         S/P total knee replacement, right - Primary     Monitor incision for signs of infection. Schedule follow up appointment with  Lamine PICKETT on 4-12-24         Impaired functional mobility, balance, gait, and endurance     PT and OT to assess and treat.         Pain     Oxycodone 5 mg po q 6 hours prn. Alternate with Tylenol.            Time:  I spent 45 minutes or greater with the patient. Greater than 50% of this time was spent in counseling and or coordination of care. The time includes prep time of reviewing vital signs, report from direct nursing staff and or therapists, hospital documentation, reviewing labs, radiographs, diagnostic tests and or consultations, time directly spent with the patient interviewing, examining, and education regarding diagnosis, treatments, and medications, as well as documentation in the electronic medical record, and reviewing the plan of care and any new orders with the patient, nursing  staff and other staff directly related to the patients care.      Celso Fajardo PA-C       Electronically Signed By: Celso Fajardo PA-C   3/31/24  6:31 PM

## 2024-03-31 VITALS
DIASTOLIC BLOOD PRESSURE: 78 MMHG | WEIGHT: 185 LBS | HEIGHT: 64 IN | HEART RATE: 70 BPM | SYSTOLIC BLOOD PRESSURE: 120 MMHG | OXYGEN SATURATION: 95 % | RESPIRATION RATE: 17 BRPM | TEMPERATURE: 97.3 F | BODY MASS INDEX: 31.58 KG/M2

## 2024-03-31 PROBLEM — Z74.09 IMPAIRED FUNCTIONAL MOBILITY, BALANCE, GAIT, AND ENDURANCE: Status: ACTIVE | Noted: 2024-03-31

## 2024-03-31 PROBLEM — R52 PAIN: Status: ACTIVE | Noted: 2024-03-31

## 2024-03-31 NOTE — PROGRESS NOTES
"3/29/2024  Name: Rae Lundy  YOB: 1945    Chief complaint: R total knee replacement.    HPI: This is a  79 year old  female who has a medical history remarkable for COPD, hyperlipidemia, dementia, chronic cough, CAD, pacemaker, paroxysmal atrial fibrillation, Villeda's esophagus, overactive bladder, iron deficiency anemia, OA of R knee. Patient had elective robot assist R total knee arthroplasty on 3/22/2024 with Dr. Stone. Post operatively, patient experienced delirium and hypoxia. Patient has history of demential and requires oxygen at home. Delirium improved and patient did well on supplemental oxygen at 2 liters. She was discharge to SNF for rehab.    Total Knee Follow-up: Patient here for 5 days post right total knee arthroplasty follow-up.  Pain is controlled on oxycodone.  The patient denies  fever, wound drainage, increasing redness, pus, increasing pain, increasing swelling, numbness . Post op problems reported:  none She is ambulating with walker..     Review of systems:   ROS negative except were noted in HPI.    Code Status: full code    /78   Pulse 70   Temp 36.3 °C (97.3 °F)   Resp 17   Ht 1.626 m (5' 4\")   Wt 83.9 kg (185 lb)   SpO2 95%   BMI 31.76 kg/m²      Physical Exam  Constitutional:       General: She is not in acute distress.  HENT:      Head: Normocephalic.      Nose: Nose normal.      Mouth/Throat:      Mouth: Mucous membranes are moist.   Eyes:      Extraocular Movements: Extraocular movements intact.      Pupils: Pupils are equal, round, and reactive to light.   Cardiovascular:      Rate and Rhythm: Normal rate and regular rhythm.      Pulses: Normal pulses.      Heart sounds: Normal heart sounds.      Comments: Pacemaker.  Pulmonary:      Effort: Pulmonary effort is normal.      Breath sounds: No wheezing.   Abdominal:      General: Bowel sounds are normal.      Palpations: Abdomen is soft.      Tenderness: There is no abdominal tenderness. There " is no guarding.   Genitourinary:     Comments: Voiding.  Musculoskeletal:         General: Tenderness present.      Cervical back: Normal range of motion.      Right lower leg: Edema present.   Lymphadenopathy:      Cervical: No cervical adenopathy.   Skin:     General: Skin is warm and dry.      Capillary Refill: Capillary refill takes less than 2 seconds.   Neurological:      General: No focal deficit present.      Mental Status: She is alert and oriented to person, place, and time.   Psychiatric:         Mood and Affect: Mood normal.         Behavior: Behavior normal.        Medications reviewed during visit at facility.  Cyclobenzaprine 5 mg po q 8 hours prn  Vitamin D 2 1.25 mg po q week  DuoNeb Solution 0.5-2.5 (3) MG/3ML (Ipratropium-Albuterol) daily  Colace 100 mg po bid  Montelukast Sodium Oral Tablet 10 MG po daily  Isosorbide Mononitrate ER Oral Tablet Extended Release 24 Hour 30 MG po daily  Omeprazole 20 mg po daily  Mirtazapine 15 mg po daily  Nitroglycerin 0.4 mg sl q 5 minutes x 3 prn  Mag Oxide 400 mg po daily  Aspirin 81 mg po bid  Metoprolol tartrate 25 mg po bid  Oxycodone 5 mg po q 6 hours prn  Benzonatate 200 mg po q 8 hours prn  Alendronate 70 mg po q week  Hydrochlorothiazide 12.5 mg po daily  Potassium chloride 10 meq po daily  Multivitamin one tablet po daily  Tylenol 650 mg po q 4 hours prn  MOM 30 ml po daily prn  Ferrous sulfate 325 mg po daily    Labs reviewed at facility:     Laboratory Service Report [id number]   Patient Name   MELANIE SMART  Patient ID   [id number]  Age   79 Y  Gender   F  Order #      Ordering SEBASTIÁN Aguirre  Patient Telephone #   N/A     1945  AKA      Client Order #   [id number]   Collection Date and Time   2024 06:32   Print Date and Time   2024 18:22  Account Information   [facility] [place]   [address] [place] [address]     Report Notes                  Test Results   Reference Perform  Unit  Value Site*              Comp Metabolic Panel  REPORTED 03/28/2024 10:55  Protein, Total L 6.2 g/dL 6.3-8.0    Albumin L 3.5 g/dL 3.9-4.9    Calcium, Total   9.1 mg/dL 8.5-10.2    Bilirubin, Total   0.4 mg/dL 0.2-1.3    Alkaline Phosphatase   92 U/L     AST   20 U/L 13-35    ALT   11 U/L 7-38    Glucose H 101 mg/dL 74-99  BUN   8 mg/dL 7-21    Creatinine   0.65 mg/dL 0.58-0.96    Sodium   138 mmol/L 136-144    Potassium   4.4 mmol/L 3.7-5.1    Chloride   98 mmol/L     CO2   29 mmol/L 22-30    Anion Gap   11 mmol/L 9-18    Estimated Glomerular Filtration Rate   90 mL/min/1.73 meters squared >=60    Assessment/Plan    Problem List Items Addressed This Visit       COPD (chronic obstructive pulmonary disease) (CMS/[facility])     DuoNeb Solution 0.5-2.5 (3) MG/3ML (Ipratropium-Albuterol) daily. Montelukast Sodium Oral Tablet 10 MG po daily         History of home oxygen therapy     Oxygen at 2 liters per nasal cannula.         S/P total knee replacement, right - Primary     Monitor incision for signs of infection. Schedule follow up appointment with  Lamine PICKETT on 4-12-24         Impaired functional mobility, balance, gait, and endurance     PT and OT to assess and treat.         Pain     Oxycodone 5 mg po q 6 hours prn. Alternate with Tylenol.            Time:  I spent 45 minutes or greater with the patient. Greater than 50% of this time was spent in counseling and or coordination of care. The time includes prep time of reviewing vital signs, report from direct nursing staff and or therapists, hospital documentation, reviewing labs, radiographs, diagnostic tests and or consultations, time directly spent with the patient interviewing, examining, and education regarding diagnosis, treatments, and medications, as well as documentation in the electronic medical record, and reviewing the plan of care and any new orders with the patient, nursing staff and other staff directly related to the patients care.      Celso ROSARIO  LORRAINE Fajardo

## 2024-03-31 NOTE — ASSESSMENT & PLAN NOTE
DuoNeb Solution 0.5-2.5 (3) MG/3ML (Ipratropium-Albuterol) daily. Montelukast Sodium Oral Tablet 10 MG po daily

## 2024-03-31 NOTE — ASSESSMENT & PLAN NOTE
Monitor incision for signs of infection. Schedule follow up appointment with  Lamine PICKETT on 4-12-24

## 2024-04-02 ENCOUNTER — APPOINTMENT (OUTPATIENT)
Dept: PHYSICAL THERAPY | Facility: CLINIC | Age: 79
End: 2024-04-02
Payer: MEDICARE

## 2024-04-03 ENCOUNTER — NURSING HOME VISIT (OUTPATIENT)
Dept: POST ACUTE CARE | Facility: EXTERNAL LOCATION | Age: 79
End: 2024-04-03
Payer: MEDICARE

## 2024-04-03 DIAGNOSIS — E55.9 VITAMIN D INSUFFICIENCY: ICD-10-CM

## 2024-04-03 DIAGNOSIS — Z96.651 S/P TOTAL KNEE REPLACEMENT, RIGHT: ICD-10-CM

## 2024-04-03 DIAGNOSIS — J44.9 CHRONIC OBSTRUCTIVE PULMONARY DISEASE, UNSPECIFIED COPD TYPE (MULTI): ICD-10-CM

## 2024-04-03 DIAGNOSIS — D50.9 IRON DEFICIENCY ANEMIA, UNSPECIFIED IRON DEFICIENCY ANEMIA TYPE: ICD-10-CM

## 2024-04-03 DIAGNOSIS — Z74.09 IMPAIRED FUNCTIONAL MOBILITY, BALANCE, GAIT, AND ENDURANCE: Primary | ICD-10-CM

## 2024-04-03 PROCEDURE — 99309 SBSQ NF CARE MODERATE MDM 30: CPT | Performed by: PHYSICIAN ASSISTANT

## 2024-04-03 NOTE — LETTER
"Patient: Rae Lundy  : 1945    Encounter Date: 2024    4/3/2024  Name: Rae Lundy  YOB: 1945    Chief complaint: Impaired mobility. R total knee replacement.    HPI: Patient has been receiving physical and occupational therapy and has showed steady progress. Review of physical therapy notes indicate she is able to walk > 75 feet during supervised ambulation. She is comfortable on current medications. R knee incision well approximated and without signs of infection.       Total Knee Follow-up: Patient here for 10 days post right total knee arthroplasty follow-up.  Pain is controlled on oxycodone.  The patient denies  fever, wound drainage, increasing redness, pus, increasing pain, increasing swelling, numbness . Post op problems reported:  none She is ambulating with walker..    Review of systems:   ROS negative except were noted in HPI.    Code Status: full code    /82   Pulse 83   Temp 36.2 °C (97.1 °F)   Resp 17   Ht 1.626 m (5' 4\")   Wt 82.1 kg (181 lb)   SpO2 90%   BMI 31.07 kg/m²      Physical Exam  Constitutional:       General: She is not in acute distress.  HENT:      Head: Normocephalic.      Nose: Nose normal.      Mouth/Throat:      Mouth: Mucous membranes are moist.   Eyes:      Extraocular Movements: Extraocular movements intact.      Pupils: Pupils are equal, round, and reactive to light.   Cardiovascular:      Rate and Rhythm: Normal rate and regular rhythm.      Pulses: Normal pulses.      Heart sounds: Normal heart sounds.      Comments: Pacemaker.  Pulmonary:      Effort: Pulmonary effort is normal.      Breath sounds: No wheezing.   Abdominal:      General: Bowel sounds are normal.      Palpations: Abdomen is soft.      Tenderness: There is no abdominal tenderness. There is no guarding.   Genitourinary:     Comments: Voiding.  Musculoskeletal:         General: Tenderness present.      Cervical back: Normal range of motion.      Right lower leg: Edema " present.   Lymphadenopathy:      Cervical: No cervical adenopathy.   Skin:     General: Skin is warm and dry.      Capillary Refill: Capillary refill takes less than 2 seconds.   Neurological:      General: No focal deficit present.      Mental Status: She is alert and oriented to person, place, and time.   Psychiatric:         Mood and Affect: Mood normal.         Behavior: Behavior normal.     Medications reviewed during visit at facility.  Cyclobenzaprine 5 mg po q 8 hours prn  Vitamin D 2 1.25 mg po q week  DuoNeb Solution 0.5-2.5 (3) MG/3ML (Ipratropium-Albuterol) daily  Colace 100 mg po bid  Montelukast Sodium Oral Tablet 10 MG po daily  Isosorbide Mononitrate ER Oral Tablet Extended Release 24 Hour 30 MG po daily  Omeprazole 20 mg po daily  Mirtazapine 15 mg po daily  Nitroglycerin 0.4 mg sl q 5 minutes x 3 prn  Mag Oxide 400 mg po daily  Aspirin 81 mg po bid  Metoprolol tartrate 25 mg po bid  Oxycodone 5 mg po q 6 hours prn  Benzonatate 200 mg po q 8 hours prn  Alendronate 70 mg po q week  Hydrochlorothiazide 12.5 mg po daily  Potassium chloride 10 meq po daily  Multivitamin one tablet po daily  Tylenol 650 mg po q 4 hours prn  MOM 30 ml po daily prn  Ferrous sulfate 325 mg po daily  Labs reviewed at facility:   Laboratory Service Report [id number]   Patient Name   MELANIE SMART  Patient ID   [id number]  Age   79 Y  Gender   F  Order #      Ordering Phys   SEBASTIÁN STINSON  Patient Telephone #   N/A     1945  AKA      Client Order #   [id number]   Collection Date and Time   2024 05:50   Print Date and Time   2024 09:55  Account Information   [facility] [place]   [address] [place] [address]     Report Notes                 Test Results   Reference Perform  Unit  Value Site*             CBC and Differential  REPORTED 2024 09:22  White Blood Cell Count   7.07 k/uL 3.70-11.00    RBC L 3.63 m/uL 3.90-5.20    Hemoglobin L 10.1 g/dL 11.5-15.5    Hematocrit L 31.8 %  36.0-46.0    MCV   87.6 fL 80.0-100.0    MCH   27.8 pg 26.0-34.0    MCHC   31.8 g/dL 30.5-36.0    RDW-CV   15.0 % 11.5-15.0    Platelet Count H 418 k/uL 150-400    MPV   9.4 fL 9.0-12.7    Neut%   54.4 %    Abs Neut   3.84 k/uL 1.45-7.50    Lymph%   32.1 %    Abs Lymph   2.27 k/uL 1.00-4.00    Mono%   9.2 %    Abs Mono   0.65 k/uL <0.87    Eosin%   3.1 %    Abs Eosin   0.22 k/uL <0.46    Baso%   0.4 %    Abs Baso   0.03 k/uL <0.11    Immature Gran %%   0.8 %    Abs Immature Gran   0.06 k/uL <0.10    NRBC   0.0 /100 WBC    Absolute nRBC   <0.01 k/uL <0.01    Diff Type   Auto               Comp Metabolic Panel  REPORTED 04/03/2024 10:21  Protein, Total   6.3 g/dL 6.3-8.0    Albumin L 3.7 g/dL 3.9-4.9    Calcium, Total   9.0 mg/dL 8.5-10.2    Bilirubin, Total   0.3 mg/dL 0.2-1.3    Alkaline Phosphatase   113 U/L     AST   15 U/L 13-35    ALT   9 U/L 7-38    Glucose   93 mg/dL 74-99  BUN   7 mg/dL 7-21    Creatinine   0.61 mg/dL 0.58-0.96    Sodium L 133 mmol/L 136-144    Potassium   3.8 mmol/L 3.7-5.1    Chloride L 93 mmol/L     CO2   25 mmol/L 22-30    Anion Gap   15 mmol/L 9-18    Estimated Glomerular Filtration Rate   91 mL/min/1.73 meters squared >=60    Assessment/Plan   Problem List Items Addressed This Visit       COPD (chronic obstructive pulmonary disease) (CMS/[facility])     DuoNeb Solution 0.5-2.5 (3) MG/3ML (Ipratropium-Albuterol) daily. Montelukast Sodium Oral Tablet 10 MG po daily Change nebulizer equipment and clean machine filter weekly.         Vitamin D insufficiency     Vitamin D 2 1.25 mg po q week         DANIELLE (iron deficiency anemia)     Ferrous sulfate 325 mg po daily.         S/P total knee replacement, right     Monitor incision for signs of infection. Schedule follow up appointment with  Lamine PICKETT on 4-12-24          Impaired functional mobility, balance, gait, and endurance - Primary     Review physical and occupational therapy notes. Discuss home going needs with social  service. Order [facility]. Patient now working > 75 feet.            Time:    Celso Fajardo PA-C       Electronically Signed By: Celso Fajardo PA-C   4/6/24 10:08 AM

## 2024-04-06 VITALS
DIASTOLIC BLOOD PRESSURE: 82 MMHG | RESPIRATION RATE: 17 BRPM | HEART RATE: 83 BPM | HEIGHT: 64 IN | OXYGEN SATURATION: 90 % | TEMPERATURE: 97.1 F | WEIGHT: 181 LBS | BODY MASS INDEX: 30.9 KG/M2 | SYSTOLIC BLOOD PRESSURE: 118 MMHG

## 2024-04-06 NOTE — ASSESSMENT & PLAN NOTE
Review physical and occupational therapy notes. Discuss home going needs with social service. Order [facility]. Patient now working > 75 feet.

## 2024-04-06 NOTE — PROGRESS NOTES
"4/3/2024  Name: Rae Lundy  YOB: 1945    Chief complaint: Impaired mobility. R total knee replacement.    HPI: Patient has been receiving physical and occupational therapy and has showed steady progress. Review of physical therapy notes indicate she is able to walk > 75 feet during supervised ambulation. She is comfortable on current medications. R knee incision well approximated and without signs of infection.       Total Knee Follow-up: Patient here for 10 days post right total knee arthroplasty follow-up.  Pain is controlled on oxycodone.  The patient denies  fever, wound drainage, increasing redness, pus, increasing pain, increasing swelling, numbness . Post op problems reported:  none She is ambulating with walker..    Review of systems:   ROS negative except were noted in HPI.    Code Status: full code    /82   Pulse 83   Temp 36.2 °C (97.1 °F)   Resp 17   Ht 1.626 m (5' 4\")   Wt 82.1 kg (181 lb)   SpO2 90%   BMI 31.07 kg/m²      Physical Exam  Constitutional:       General: She is not in acute distress.  HENT:      Head: Normocephalic.      Nose: Nose normal.      Mouth/Throat:      Mouth: Mucous membranes are moist.   Eyes:      Extraocular Movements: Extraocular movements intact.      Pupils: Pupils are equal, round, and reactive to light.   Cardiovascular:      Rate and Rhythm: Normal rate and regular rhythm.      Pulses: Normal pulses.      Heart sounds: Normal heart sounds.      Comments: Pacemaker.  Pulmonary:      Effort: Pulmonary effort is normal.      Breath sounds: No wheezing.   Abdominal:      General: Bowel sounds are normal.      Palpations: Abdomen is soft.      Tenderness: There is no abdominal tenderness. There is no guarding.   Genitourinary:     Comments: Voiding.  Musculoskeletal:         General: Tenderness present.      Cervical back: Normal range of motion.      Right lower leg: Edema present.   Lymphadenopathy:      Cervical: No cervical adenopathy. "   Skin:     General: Skin is warm and dry.      Capillary Refill: Capillary refill takes less than 2 seconds.   Neurological:      General: No focal deficit present.      Mental Status: She is alert and oriented to person, place, and time.   Psychiatric:         Mood and Affect: Mood normal.         Behavior: Behavior normal.     Medications reviewed during visit at facility.  Cyclobenzaprine 5 mg po q 8 hours prn  Vitamin D 2 1.25 mg po q week  DuoNeb Solution 0.5-2.5 (3) MG/3ML (Ipratropium-Albuterol) daily  Colace 100 mg po bid  Montelukast Sodium Oral Tablet 10 MG po daily  Isosorbide Mononitrate ER Oral Tablet Extended Release 24 Hour 30 MG po daily  Omeprazole 20 mg po daily  Mirtazapine 15 mg po daily  Nitroglycerin 0.4 mg sl q 5 minutes x 3 prn  Mag Oxide 400 mg po daily  Aspirin 81 mg po bid  Metoprolol tartrate 25 mg po bid  Oxycodone 5 mg po q 6 hours prn  Benzonatate 200 mg po q 8 hours prn  Alendronate 70 mg po q week  Hydrochlorothiazide 12.5 mg po daily  Potassium chloride 10 meq po daily  Multivitamin one tablet po daily  Tylenol 650 mg po q 4 hours prn  MOM 30 ml po daily prn  Ferrous sulfate 325 mg po daily  Labs reviewed at facility:   Laboratory Service Report [id number]   Patient Name   MELANIE SMART  Patient ID   [id number]  Age   79 Y  Gender   F  Order #      Ordering Phys   SEBASTIÁN STINSON  Patient Telephone #   N/A     1945  AKA      Client Order #   [id number]   Collection Date and Time   2024 05:50   Print Date and Time   2024 09:55  Account Information   [facility]   [id number] [place] [address]     Report Notes                 Test Results   Reference Perform  Unit  Value Site*             CBC and Differential  REPORTED 2024 09:22  White Blood Cell Count   7.07 k/uL 3.70-11.00    RBC L 3.63 m/uL 3.90-5.20    Hemoglobin L 10.1 g/dL 11.5-15.5    Hematocrit L 31.8 % 36.0-46.0    MCV   87.6 fL 80.0-100.0    MCH   27.8 pg 26.0-34.0     MCHC   31.8 g/dL 30.5-36.0    RDW-CV   15.0 % 11.5-15.0    Platelet Count H 418 k/uL 150-400    MPV   9.4 fL 9.0-12.7    Neut%   54.4 %    Abs Neut   3.84 k/uL 1.45-7.50    Lymph%   32.1 %    Abs Lymph   2.27 k/uL 1.00-4.00    Mono%   9.2 %    Abs Mono   0.65 k/uL <0.87    Eosin%   3.1 %    Abs Eosin   0.22 k/uL <0.46    Baso%   0.4 %    Abs Baso   0.03 k/uL <0.11    Immature Gran %%   0.8 %    Abs Immature Gran   0.06 k/uL <0.10    NRBC   0.0 /100 WBC    Absolute nRBC   <0.01 k/uL <0.01    Diff Type   Auto               Comp Metabolic Panel  REPORTED 04/03/2024 10:21  Protein, Total   6.3 g/dL 6.3-8.0    Albumin L 3.7 g/dL 3.9-4.9    Calcium, Total   9.0 mg/dL 8.5-10.2    Bilirubin, Total   0.3 mg/dL 0.2-1.3    Alkaline Phosphatase   113 U/L     AST   15 U/L 13-35    ALT   9 U/L 7-38    Glucose   93 mg/dL 74-99  BUN   7 mg/dL 7-21    Creatinine   0.61 mg/dL 0.58-0.96    Sodium L 133 mmol/L 136-144    Potassium   3.8 mmol/L 3.7-5.1    Chloride L 93 mmol/L     CO2   25 mmol/L 22-30    Anion Gap   15 mmol/L 9-18    Estimated Glomerular Filtration Rate   91 mL/min/1.73 meters squared >=60    Assessment/Plan    Problem List Items Addressed This Visit       COPD (chronic obstructive pulmonary disease) (CMS/[facility])     DuoNeb Solution 0.5-2.5 (3) MG/3ML (Ipratropium-Albuterol) daily. Montelukast Sodium Oral Tablet 10 MG po daily Change nebulizer equipment and clean machine filter weekly.         Vitamin D insufficiency     Vitamin D 2 1.25 mg po q week         DANIELLE (iron deficiency anemia)     Ferrous sulfate 325 mg po daily.         S/P total knee replacement, right     Monitor incision for signs of infection. Schedule follow up appointment with  Lamine PICKETT on 4-12-24          Impaired functional mobility, balance, gait, and endurance - Primary     Review physical and occupational therapy notes. Discuss home going needs with social service. Order [facility]. Patient now working > 75 feet.             Time:    Celso Fajardo PA-C

## 2024-04-06 NOTE — ASSESSMENT & PLAN NOTE
DuoNeb Solution 0.5-2.5 (3) MG/3ML (Ipratropium-Albuterol) daily. Montelukast Sodium Oral Tablet 10 MG po daily Change nebulizer equipment and clean machine filter weekly.

## 2024-04-09 ENCOUNTER — PATIENT MESSAGE (OUTPATIENT)
Dept: CARDIOLOGY | Facility: CLINIC | Age: 79
End: 2024-04-09
Payer: MEDICARE

## 2024-04-09 RX ORDER — METOPROLOL TARTRATE 25 MG/1
TABLET, FILM COATED ORAL
Qty: 28 TABLET | Refills: 0 | OUTPATIENT
Start: 2024-04-09

## 2024-04-09 NOTE — TELEPHONE ENCOUNTER
From: Rae Lundy  To: Jamila Matute MD  Sent: 4/9/2024 3:00 PM EDT  Subject: Metoprolol Tartrate    I need a script written for this. When we were in there last I didn't realize I needed it.

## 2024-04-10 DIAGNOSIS — I10 BENIGN ESSENTIAL HYPERTENSION: Primary | ICD-10-CM

## 2024-04-10 RX ORDER — METOPROLOL TARTRATE 50 MG/1
25 TABLET ORAL 2 TIMES DAILY
Qty: 90 TABLET | Refills: 3 | Status: SHIPPED | OUTPATIENT
Start: 2024-04-10

## 2024-04-11 ENCOUNTER — TELEPHONE (OUTPATIENT)
Dept: PRIMARY CARE | Facility: CLINIC | Age: 79
End: 2024-04-11
Payer: MEDICARE

## 2024-04-11 NOTE — TELEPHONE ENCOUNTER
Saray from [facility] called office requesting verbal orders for nursing, PT and OT for the patient.     Also requesting the date of last OV which was 03/12/2024  OK for verbal orders?    Saray [phone]

## 2024-04-12 ENCOUNTER — OFFICE VISIT (OUTPATIENT)
Dept: ORTHOPEDIC SURGERY | Facility: CLINIC | Age: 79
End: 2024-04-12
Payer: MEDICARE

## 2024-04-12 ENCOUNTER — HOSPITAL ENCOUNTER (OUTPATIENT)
Dept: RADIOLOGY | Facility: CLINIC | Age: 79
Discharge: HOME | End: 2024-04-12
Payer: MEDICARE

## 2024-04-12 DIAGNOSIS — Z96.651 STATUS POST TOTAL RIGHT KNEE REPLACEMENT: ICD-10-CM

## 2024-04-12 DIAGNOSIS — Z96.651 AFTERCARE FOLLOWING RIGHT KNEE JOINT REPLACEMENT SURGERY: Primary | ICD-10-CM

## 2024-04-12 DIAGNOSIS — Z47.1 AFTERCARE FOLLOWING RIGHT KNEE JOINT REPLACEMENT SURGERY: Primary | ICD-10-CM

## 2024-04-12 PROCEDURE — 1157F ADVNC CARE PLAN IN RCRD: CPT | Performed by: PHYSICIAN ASSISTANT

## 2024-04-12 PROCEDURE — 1160F RVW MEDS BY RX/DR IN RCRD: CPT | Performed by: PHYSICIAN ASSISTANT

## 2024-04-12 PROCEDURE — 99024 POSTOP FOLLOW-UP VISIT: CPT | Performed by: PHYSICIAN ASSISTANT

## 2024-04-12 PROCEDURE — 73562 X-RAY EXAM OF KNEE 3: CPT | Mod: RT

## 2024-04-12 PROCEDURE — 73562 X-RAY EXAM OF KNEE 3: CPT | Mod: RIGHT SIDE | Performed by: ORTHOPAEDIC SURGERY

## 2024-04-12 PROCEDURE — 1159F MED LIST DOCD IN RCRD: CPT | Performed by: PHYSICIAN ASSISTANT

## 2024-04-12 NOTE — PROGRESS NOTES
Subjective    Patient ID: Rae    Chief Complaint:   Chief Complaint   Patient presents with    Right Knee - Post-op     Tka 3/22/24  xray     History of present illness    79-year-old female presented to clinic today for first postop follow-up visit status post right total knee replacement performed 3/22/2024.  Overall doing very well at this time.  Ambulating with the assistance of walker.  She states that she still having some balance issues at home and her confidence is not quite high enough to get off the walker.  I explained to her that this is okay.  She has mild numbness over the anterior lateral aspect of the right knee.  No locking catching at this time.  No fevers chills reported.  She just got home from [place] and is starting in-home rehab next week.  She just takes Tylenol for pain relief at this time which seems to be sufficient.  Still getting some mild restlessness at night.      Past medical , Surgical, Family and social history reviewed.      Physical exam  General: No acute distress and breathing comfortably.  Patient is pleasant and cooperative with the examination.    Extremity  Right knee is neurovascular intact.  Range of motion 0 to 95 degrees.  Mild edema of the right knee.  No erythema ecchymosis seen.  No calf swelling or tenderness.  2+ pulses bilateral lower extremity.  Capillary refill within normal is distally.  Incision is well-healed without abnormality or infection.  No DVT.    Diagnostics  Please see dictated x-ray port  XR knee right 1-2 views    Result Date: 3/22/2024  Interpreted By:  Lacy Enrique, STUDY: XR KNEE RIGHT 1-2 VIEWS;  3/22/2024 3:42 pm   INDICATION: Signs/Symptoms:Post op knee.   COMPARISON: 11/16/2023   ACCESSION NUMBER(S): [id number]   ORDERING CLINICIAN: JAYSON BARRERA   FINDINGS: 2 portable views right knee, frontal and lateral projections.   There is good position of the distal femoral, proximal tibial and patellar prostheses. There is no fracture  or dislocation.       Status post right total knee arthroplasty.   MACRO: None   Signed by: Lacy Iva 3/22/2024 4:36 PM Dictation workstation:   [id number]       Procedure  [ none]    Assessment  Status post right total knee replacement    Treatment plan  1.  Wound instructions were discussed with patient.  2.  She will continue with weightbearing activity as tolerated.  Transition to home rehab.  Prescription for outpatient therapy was given when it is time to transition to outpatient therapy.  3.  She will follow-up with us proximately 3 weeks for reevaluation without x-ray  4.  All of the patient's questions were answered.    Orders Placed This Encounter    XR knee right 3 views       This note was prepared using voice recognition software.  The details of this note are correct and have been reviewed, and corrected to the best of my ability.  Some grammatical areas may persist related to the Dragon software    Lamine Culver PA-C, [facility]  [facility]  Orthopedic [place]    [phone]

## 2024-05-03 ENCOUNTER — TELEPHONE (OUTPATIENT)
Dept: PRIMARY CARE | Facility: CLINIC | Age: 79
End: 2024-05-03
Payer: MEDICARE

## 2024-05-03 NOTE — TELEPHONE ENCOUNTER
Home therapist call office left message on nurse line that Pt blood pressure was elevated 168/84.    Did not leave name, or her call back number

## 2024-05-08 ENCOUNTER — OFFICE VISIT (OUTPATIENT)
Dept: ORTHOPEDIC SURGERY | Facility: CLINIC | Age: 79
End: 2024-05-08
Payer: MEDICARE

## 2024-05-08 DIAGNOSIS — Z47.1 AFTERCARE FOLLOWING RIGHT KNEE JOINT REPLACEMENT SURGERY: Primary | ICD-10-CM

## 2024-05-08 DIAGNOSIS — Z96.651 AFTERCARE FOLLOWING RIGHT KNEE JOINT REPLACEMENT SURGERY: Primary | ICD-10-CM

## 2024-05-08 PROCEDURE — 1160F RVW MEDS BY RX/DR IN RCRD: CPT | Performed by: ORTHOPAEDIC SURGERY

## 2024-05-08 PROCEDURE — 1159F MED LIST DOCD IN RCRD: CPT | Performed by: ORTHOPAEDIC SURGERY

## 2024-05-08 PROCEDURE — 1036F TOBACCO NON-USER: CPT | Performed by: ORTHOPAEDIC SURGERY

## 2024-05-08 PROCEDURE — 99024 POSTOP FOLLOW-UP VISIT: CPT | Performed by: ORTHOPAEDIC SURGERY

## 2024-05-08 PROCEDURE — 1157F ADVNC CARE PLAN IN RCRD: CPT | Performed by: ORTHOPAEDIC SURGERY

## 2024-05-08 NOTE — PROGRESS NOTES
History of present illness    79-year-old female follow-up right total knee replacement surgery March 22 she states she is doing pretty well still feels stiff and sore she is been doing home physical therapy no numbness or tingling no fevers or chills she is weightbearing as tolerated.      Past medical , Surgical, Family and social history reviewed.      Physical exam  General: No acute distress and breathing comfortably.  Patient is pleasant and cooperative with the examination.    Extremity  Incision is well-approximated without sign infection range of motion 0-100 no instability brisk cap refill compartments soft calf is nontender Homans' sign negative    Diagnostics      XR knee right 3 views    Result Date: 4/16/2024  Interpreted By:  Jayson Stone, STUDY: XR KNEE RIGHT 3 VIEWS; 4/12/2024 1:31 pm   INDICATION: Signs/Symptoms:pain.   ACCESSION NUMBER(S): [id number]   ORDERING CLINICIAN: JAYSON STONE   FINDINGS: Three views of the knee show status post joint replacement in good alignment.  There are no signs of fracture or dislocation.  No other bony abnormalities       Signed by: Jayson Stone 4/16/2024 7:40 AM Dictation workstation:   [id number]       Procedure  [ none]    Assessment  Status post total knee replacement right    Treatment plan  1.  Continue working on range of motion and strengthening continue with her exercise program follow-up with me in 4 to 6 weeks with new x-rays sooner if there is increased pain or discomfort.  2. [   ]  3. [   ]  4.  All of the patient's questions were answered.    No orders of the defined types were placed in this encounter.      This note was prepared using voice recognition software.  The details of this note are correct and have been reviewed, and corrected to the best of my ability.  Some grammatical areas may persist related to the Dragon software    Jayson Stone MD  Senior Attending Physician  [employer]  [facility]  Orthopedic [place]    [phone]

## 2024-05-13 DIAGNOSIS — K22.70 BARRETT'S ESOPHAGUS WITHOUT DYSPLASIA: Primary | ICD-10-CM

## 2024-05-14 RX ORDER — OMEPRAZOLE 20 MG/1
20 CAPSULE, DELAYED RELEASE ORAL
Qty: 90 CAPSULE | Refills: 1 | Status: SHIPPED | OUTPATIENT
Start: 2024-05-13 | End: 2025-05-13

## 2024-06-12 ENCOUNTER — HOSPITAL ENCOUNTER (OUTPATIENT)
Dept: RADIOLOGY | Facility: CLINIC | Age: 79
Discharge: HOME | End: 2024-06-12
Payer: MEDICARE

## 2024-06-12 ENCOUNTER — OFFICE VISIT (OUTPATIENT)
Dept: ORTHOPEDIC SURGERY | Facility: CLINIC | Age: 79
End: 2024-06-12
Payer: MEDICARE

## 2024-06-12 DIAGNOSIS — Z96.651 STATUS POST TOTAL RIGHT KNEE REPLACEMENT: ICD-10-CM

## 2024-06-12 DIAGNOSIS — Z96.651 AFTERCARE FOLLOWING RIGHT KNEE JOINT REPLACEMENT SURGERY: Primary | ICD-10-CM

## 2024-06-12 DIAGNOSIS — Z47.1 AFTERCARE FOLLOWING RIGHT KNEE JOINT REPLACEMENT SURGERY: Primary | ICD-10-CM

## 2024-06-12 PROCEDURE — 1159F MED LIST DOCD IN RCRD: CPT | Performed by: ORTHOPAEDIC SURGERY

## 2024-06-12 PROCEDURE — 99024 POSTOP FOLLOW-UP VISIT: CPT | Performed by: ORTHOPAEDIC SURGERY

## 2024-06-12 PROCEDURE — 73562 X-RAY EXAM OF KNEE 3: CPT | Mod: RIGHT SIDE | Performed by: ORTHOPAEDIC SURGERY

## 2024-06-12 PROCEDURE — 1157F ADVNC CARE PLAN IN RCRD: CPT | Performed by: ORTHOPAEDIC SURGERY

## 2024-06-12 PROCEDURE — 73562 X-RAY EXAM OF KNEE 3: CPT | Mod: RT

## 2024-06-12 RX ORDER — CYCLOBENZAPRINE HCL 5 MG
5 TABLET ORAL 3 TIMES DAILY PRN
COMMUNITY
Start: 2024-04-08

## 2024-06-12 RX ORDER — BISACODYL 5 MG
10 TABLET, DELAYED RELEASE (ENTERIC COATED) ORAL DAILY PRN
COMMUNITY
Start: 2024-03-22

## 2024-06-12 RX ORDER — DIPHENHYDRAMINE HYDROCHLORIDE 12.5 MG/5ML
12.5 LIQUID ORAL EVERY 6 HOURS PRN
COMMUNITY
Start: 2024-03-22

## 2024-06-12 RX ORDER — METOPROLOL TARTRATE 25 MG/1
25 TABLET, FILM COATED ORAL DAILY
COMMUNITY
Start: 2024-05-09

## 2024-06-12 RX ORDER — MORPHINE SULFATE 2 MG/ML
2 INJECTION, SOLUTION INTRAMUSCULAR; INTRAVENOUS
COMMUNITY
Start: 2024-03-22

## 2024-06-12 RX ORDER — HYDROMORPHONE HYDROCHLORIDE 1 MG/ML
0.5 INJECTION, SOLUTION INTRAMUSCULAR; INTRAVENOUS; SUBCUTANEOUS EVERY 4 HOURS PRN
COMMUNITY
Start: 2024-03-22

## 2024-06-13 NOTE — PROGRESS NOTES
History of present illness    79-year-old female follow-up of her right total knee replacement surgery 3/22/2024 not quite 3 months postop doing well she states she does use her walker most of the time but mainly due to balance no numbness or tingling no fevers or chills states she is making good progress no chest pain or shortness of breath no redness or drainage      Past medical , Surgical, Family and social history reviewed.      Physical exam  General: No acute distress and breathing comfortably.  Patient is pleasant and cooperative with the examination.    Extremity  Incisions well-approximated without signs infection range of motion 0-100 no instability brisk capillary fill compartments are soft calf is nontender    Diagnostics      XR knee right 3 views    Result Date: 6/12/2024  Interpreted By:  Jayson Stone, STUDY: XR KNEE RIGHT 3 VIEWS; 6/12/2024 10:07 am   INDICATION: Signs/Symptoms:pain.   ACCESSION NUMBER(S): [id number]   ORDERING CLINICIAN: JAYSON STONE   FINDINGS: Three views of the knee show status post joint replacement in good alignment.  There are no signs of fracture or dislocation.  No other bony abnormalities       Signed by: Jayson Stone 6/12/2024 11:00 AM Dictation workstation:   [id number]       Procedure  [ none]    Assessment  Healing total knee replacement right    Treatment plan  1.  Continue with her physical therapy continue to weight-bear as tolerated follow-up with me in 3 months with x-rays sooner if she has increased pain or discomfort.  2. [   ]  3. [   ]  4.  All of the patient's questions were answered.    Orders Placed This Encounter    XR knee right 3 views       This note was prepared using voice recognition software.  The details of this note are correct and have been reviewed, and corrected to the best of my ability.  Some grammatical areas may persist related to the Dragon software    Jayson Stone MD  Senior Attending  [facility]  Orthopedic [place]    [phone]

## 2024-06-19 ENCOUNTER — DOCUMENTATION (OUTPATIENT)
Dept: HOME HEALTH SERVICES | Facility: HOME HEALTH | Age: 79
End: 2024-06-19
Payer: MEDICARE

## 2024-06-19 ENCOUNTER — TELEPHONE (OUTPATIENT)
Dept: ORTHOPEDIC SURGERY | Facility: CLINIC | Age: 79
End: 2024-06-19
Payer: MEDICARE

## 2024-06-19 ENCOUNTER — HOME HEALTH ADMISSION (OUTPATIENT)
Dept: HOME HEALTH SERVICES | Facility: HOME HEALTH | Age: 79
End: 2024-06-19
Payer: MEDICARE

## 2024-06-19 DIAGNOSIS — Z96.651 AFTERCARE FOLLOWING RIGHT KNEE JOINT REPLACEMENT SURGERY: ICD-10-CM

## 2024-06-19 DIAGNOSIS — Z47.1 AFTERCARE FOLLOWING RIGHT KNEE JOINT REPLACEMENT SURGERY: ICD-10-CM

## 2024-06-19 DIAGNOSIS — M17.11 PRIMARY OSTEOARTHRITIS OF RIGHT KNEE: ICD-10-CM

## 2024-06-19 NOTE — HH CARE COORDINATION
Home Care received a Referral for Physical Therapy. We have processed the referral for a Start of Care on 06/21/2024.     If you have any questions or concerns, please feel free to contact us at [phone]. Follow the prompts, enter your five digit zip code, and you will be directed to your care team on [facility].

## 2024-06-19 NOTE — TELEPHONE ENCOUNTER
Called and spoke with patient in regards to therapy, patient seemed confused and stated she has not done any physical therapy. I offered to put in an order, she was not sure where she wanted to go, I offered her to come in to [place] location where she has seen Dr. Stone for appointments in the past, patient denied ever seeing Dr. Stone outside of being inpatient even though she just had a follow up appointment with him last week. We did end up deciding home health physical therapy would be best for patient considering she will need assistance of others to leave her home.

## 2024-06-19 NOTE — TELEPHONE ENCOUNTER
Pt called saying that her tkr is swollen and will like to know why she's not getting physical therapy and she will like to know if she can have some pt because she's is not progressing

## 2024-06-22 ENCOUNTER — HOME CARE VISIT (OUTPATIENT)
Dept: HOME HEALTH SERVICES | Facility: HOME HEALTH | Age: 79
End: 2024-06-22
Payer: MEDICARE

## 2024-06-22 ENCOUNTER — HOME CARE VISIT (OUTPATIENT)
Dept: HOME HEALTH SERVICES | Facility: HOME HEALTH | Age: 79
End: 2024-06-22

## 2024-06-22 VITALS
SYSTOLIC BLOOD PRESSURE: 124 MMHG | RESPIRATION RATE: 14 BRPM | DIASTOLIC BLOOD PRESSURE: 72 MMHG | HEART RATE: 78 BPM | OXYGEN SATURATION: 95 % | TEMPERATURE: 97.7 F

## 2024-06-22 PROCEDURE — G0151 HHCP-SERV OF PT,EA 15 MIN: HCPCS | Mod: HHH

## 2024-06-22 PROCEDURE — 169592 NO-PAY CLAIM PROCEDURE

## 2024-06-22 SDOH — HEALTH STABILITY: PHYSICAL HEALTH: EXERCISE ACTIVITY: ANKLE DF/PF

## 2024-06-22 SDOH — HEALTH STABILITY: PHYSICAL HEALTH: PHYSICAL EXERCISE: SEATED

## 2024-06-22 SDOH — HEALTH STABILITY: PHYSICAL HEALTH: PHYSICAL EXERCISE: 15

## 2024-06-22 SDOH — HEALTH STABILITY: PHYSICAL HEALTH: EXERCISE ACTIVITIES SETS: 1

## 2024-06-22 SDOH — HEALTH STABILITY: PHYSICAL HEALTH: EXERCISE TYPE: LE EXERCISES

## 2024-06-22 SDOH — HEALTH STABILITY: PHYSICAL HEALTH: EXERCISE ACTIVITY: LAQ

## 2024-06-22 SDOH — HEALTH STABILITY: PHYSICAL HEALTH: EXERCISE ACTIVITY: MARCHING

## 2024-06-22 ASSESSMENT — BALANCE ASSESSMENTS
EYES CLOSED AT MAXIMUM POSITION NUDGED: 1 - STEADY
NUDGED SCORE: 1
ARISES: 1 - ABLE, USES ARMS TO HELP
NUDGED: 1 - STAGGERS, GRABS, CATCHES SELF
ATTEMPTS TO ARISE: 2 - ABLE TO RISE, ONE ATTEMPT
STANDING BALANCE: 1 - STEADY BUT WIDE STANCE AND USES CANE OR OTHER SUPPORT
ARISING SCORE: 1
SITTING DOWN: 1 - USES ARMS OR NOT SMOOTH MOTION
BALANCE SCORE: 11
SITTING BALANCE: 1 - STEADY, SAFE
IMMEDIATE STANDING BALANCE FIRST 5 SECONDS: 2 - STEADY WITHOUT WALKER OR OTHER SUPPORT
TURNING 360 DEGREES STEPS: 0 - DISCONTINUOUS STEPS

## 2024-06-22 ASSESSMENT — GAIT ASSESSMENTS
STEP CONTINUITY: 1 - STEPS APPEAR CONTINUOUS
PATH: 1 - MILD/MODERATE DEVIATION OR USES WALKING AID
INITIATION OF GAIT IMMEDIATELY AFTER GO: 1 - NO HESITANCY
GAIT SCORE: 10
WALKING STANCE: 1 - HEELS ALMOST TOUCHING WHILE WALKING
STEP SYMMETRY: 1 - RIGHT AND LEFT STEP LENGTH APPEAR EQUAL
TRUNK SCORE: 1
PATH SCORE: 1
TRUNK: 1 - NO SWAY BUT FLEXION OF KNEES OR BACK OR SPREADS ARMS WHILE WALKING
BALANCE AND GAIT SCORE: 21

## 2024-06-22 ASSESSMENT — ENCOUNTER SYMPTOMS
LOWEST PAIN SEVERITY IN PAST 24 HOURS: 2/10
HIGHEST PAIN SEVERITY IN PAST 24 HOURS: 6/10
PAIN LOCATION: RIGHT KNEE
PAIN LOCATION - PAIN SEVERITY: 5/10
PAIN LOCATION - PAIN FREQUENCY: INTERMITTENT
SUBJECTIVE PAIN PROGRESSION: GRADUALLY WORSENING
PAIN LOCATION - RELIEVING FACTORS: REST, ICE
PERSON REPORTING PAIN: PATIENT
HYPERTENSION: 1
PAIN: 1
PAIN SEVERITY GOAL: 0/10
MUSCLE WEAKNESS: 1
PAIN LOCATION - EXACERBATING FACTORS: WALKING, STANDING
SHORTNESS OF BREATH: T
OCCASIONAL FEELINGS OF UNSTEADINESS: 1

## 2024-06-22 ASSESSMENT — ACTIVITIES OF DAILY LIVING (ADL)
AMBULATION ASSISTANCE ON FLAT SURFACES: 1
AMBULATION ASSISTANCE: STAND BY ASSIST
CURRENT_FUNCTION: STAND BY ASSIST
AMBULATION_DISTANCE/DURATION_TOLERATED: 100 FT
ENTERING_EXITING_HOME: STAND BY ASSIST
OASIS_M1830: 03

## 2024-06-25 ENCOUNTER — HOME CARE VISIT (OUTPATIENT)
Dept: HOME HEALTH SERVICES | Facility: HOME HEALTH | Age: 79
End: 2024-06-25
Payer: MEDICARE

## 2024-06-25 VITALS
SYSTOLIC BLOOD PRESSURE: 128 MMHG | DIASTOLIC BLOOD PRESSURE: 72 MMHG | TEMPERATURE: 98 F | OXYGEN SATURATION: 95 % | HEART RATE: 76 BPM

## 2024-06-25 PROCEDURE — G0151 HHCP-SERV OF PT,EA 15 MIN: HCPCS | Mod: HHH

## 2024-06-25 SDOH — HEALTH STABILITY: PHYSICAL HEALTH: EXERCISE ACTIVITIES SETS: 1

## 2024-06-25 SDOH — HEALTH STABILITY: PHYSICAL HEALTH: PHYSICAL EXERCISE: 15

## 2024-06-25 SDOH — HEALTH STABILITY: PHYSICAL HEALTH: PHYSICAL EXERCISE: SEATED

## 2024-06-25 SDOH — HEALTH STABILITY: PHYSICAL HEALTH: RESISTANCE: ORANGE THERABAND

## 2024-06-25 SDOH — HEALTH STABILITY: PHYSICAL HEALTH

## 2024-06-25 SDOH — HEALTH STABILITY: PHYSICAL HEALTH: EXERCISE ACTIVITY: LAQ

## 2024-06-25 SDOH — HEALTH STABILITY: PHYSICAL HEALTH: EXERCISE ACTIVITY: HIP EXTENSION

## 2024-06-25 SDOH — HEALTH STABILITY: PHYSICAL HEALTH: EXERCISE ACTIVITY: HS CURLS

## 2024-06-25 SDOH — HEALTH STABILITY: PHYSICAL HEALTH: EXERCISE TYPE: LE EXERCISES

## 2024-06-25 SDOH — HEALTH STABILITY: PHYSICAL HEALTH: EXERCISE ACTIVITY: HIP ABDUCTION

## 2024-06-25 SDOH — HEALTH STABILITY: PHYSICAL HEALTH: EXERCISE ACTIVITY: MARCHING

## 2024-06-25 SDOH — HEALTH STABILITY: PHYSICAL HEALTH: EXERCISE ACTIVITY: ANKLE DF/PF

## 2024-06-25 ASSESSMENT — ENCOUNTER SYMPTOMS
PAIN: 1
HIGHEST PAIN SEVERITY IN PAST 24 HOURS: 4/10
PAIN LOCATION: RIGHT KNEE
PAIN LOCATION - EXACERBATING FACTORS: PROLONGED WALKING
MUSCLE WEAKNESS: 1
LOWEST PAIN SEVERITY IN PAST 24 HOURS: 2/10
PAIN LOCATION - PAIN SEVERITY: 4/10
PAIN LOCATION - PAIN FREQUENCY: CONSTANT
PAIN SEVERITY GOAL: 0/10
PERSON REPORTING PAIN: PATIENT
SUBJECTIVE PAIN PROGRESSION: UNCHANGED

## 2024-06-25 ASSESSMENT — ACTIVITIES OF DAILY LIVING (ADL)
AMBULATION ASSISTANCE ON FLAT SURFACES: 1
CURRENT_FUNCTION: STAND BY ASSIST
AMBULATION ASSISTANCE: STAND BY ASSIST
AMBULATION_DISTANCE/DURATION_TOLERATED: 100 FT X 2

## 2024-06-27 ENCOUNTER — HOME CARE VISIT (OUTPATIENT)
Dept: HOME HEALTH SERVICES | Facility: HOME HEALTH | Age: 79
End: 2024-06-27
Payer: MEDICARE

## 2024-06-27 VITALS
HEART RATE: 79 BPM | OXYGEN SATURATION: 95 % | SYSTOLIC BLOOD PRESSURE: 136 MMHG | DIASTOLIC BLOOD PRESSURE: 72 MMHG | RESPIRATION RATE: 14 BRPM | TEMPERATURE: 98 F

## 2024-06-27 PROCEDURE — G0151 HHCP-SERV OF PT,EA 15 MIN: HCPCS | Mod: HHH

## 2024-06-27 SDOH — HEALTH STABILITY: PHYSICAL HEALTH: EXERCISE ACTIVITIES SETS: 1

## 2024-06-27 SDOH — HEALTH STABILITY: PHYSICAL HEALTH: EXERCISE ACTIVITY: HIP ABDUCTION

## 2024-06-27 SDOH — HEALTH STABILITY: PHYSICAL HEALTH: EXERCISE ACTIVITY: ANKLE DF/PF

## 2024-06-27 SDOH — HEALTH STABILITY: PHYSICAL HEALTH: PHYSICAL EXERCISE: 15

## 2024-06-27 SDOH — HEALTH STABILITY: PHYSICAL HEALTH: PHYSICAL EXERCISE: SEATED

## 2024-06-27 SDOH — HEALTH STABILITY: PHYSICAL HEALTH: RESISTANCE: ORANGE THERABAND

## 2024-06-27 SDOH — HEALTH STABILITY: PHYSICAL HEALTH

## 2024-06-27 SDOH — HEALTH STABILITY: PHYSICAL HEALTH: EXERCISE TYPE: LE EXERCISES

## 2024-06-27 SDOH — HEALTH STABILITY: PHYSICAL HEALTH: EXERCISE ACTIVITY: MARCHING

## 2024-06-27 SDOH — HEALTH STABILITY: PHYSICAL HEALTH: EXERCISE ACTIVITY: HS CURLS

## 2024-06-27 SDOH — HEALTH STABILITY: PHYSICAL HEALTH: EXERCISE ACTIVITY: HIP EXTENSION

## 2024-06-27 SDOH — HEALTH STABILITY: PHYSICAL HEALTH: EXERCISE ACTIVITY: LAQ

## 2024-06-27 ASSESSMENT — ENCOUNTER SYMPTOMS: MUSCLE WEAKNESS: 1

## 2024-06-27 ASSESSMENT — ACTIVITIES OF DAILY LIVING (ADL)
AMBULATION ASSISTANCE: STAND BY ASSIST
AMBULATION ASSISTANCE ON FLAT SURFACES: 1
AMBULATION_DISTANCE/DURATION_TOLERATED: 75 FT X 2

## 2024-07-02 ENCOUNTER — HOME CARE VISIT (OUTPATIENT)
Dept: HOME HEALTH SERVICES | Facility: HOME HEALTH | Age: 79
End: 2024-07-02
Payer: MEDICARE

## 2024-07-02 VITALS
RESPIRATION RATE: 14 BRPM | HEART RATE: 69 BPM | SYSTOLIC BLOOD PRESSURE: 148 MMHG | DIASTOLIC BLOOD PRESSURE: 76 MMHG | TEMPERATURE: 98 F | OXYGEN SATURATION: 93 %

## 2024-07-02 PROCEDURE — G0151 HHCP-SERV OF PT,EA 15 MIN: HCPCS | Mod: HHH

## 2024-07-02 SDOH — HEALTH STABILITY: PHYSICAL HEALTH: PHYSICAL EXERCISE: 15

## 2024-07-02 SDOH — HEALTH STABILITY: PHYSICAL HEALTH: EXERCISE ACTIVITY: HIP ABDUCTION

## 2024-07-02 SDOH — HEALTH STABILITY: PHYSICAL HEALTH: PHYSICAL EXERCISE: SEATED

## 2024-07-02 SDOH — HEALTH STABILITY: PHYSICAL HEALTH

## 2024-07-02 SDOH — HEALTH STABILITY: PHYSICAL HEALTH: EXERCISE ACTIVITY: HIP EXTENSION

## 2024-07-02 SDOH — HEALTH STABILITY: PHYSICAL HEALTH: RESISTANCE: ORANGE THERABAND

## 2024-07-02 SDOH — HEALTH STABILITY: PHYSICAL HEALTH: EXERCISE ACTIVITIES SETS: 1

## 2024-07-02 SDOH — HEALTH STABILITY: PHYSICAL HEALTH: EXERCISE ACTIVITY: LAQ

## 2024-07-02 SDOH — HEALTH STABILITY: PHYSICAL HEALTH: EXERCISE ACTIVITY: ANKLE DF/PF

## 2024-07-02 SDOH — HEALTH STABILITY: PHYSICAL HEALTH: EXERCISE TYPE: LE EXERCISES

## 2024-07-02 SDOH — HEALTH STABILITY: PHYSICAL HEALTH: EXERCISE ACTIVITY: MARCHING

## 2024-07-02 SDOH — HEALTH STABILITY: PHYSICAL HEALTH: EXERCISE ACTIVITY: HS CURLS

## 2024-07-02 ASSESSMENT — ENCOUNTER SYMPTOMS
PAIN LOCATION - EXACERBATING FACTORS: WALKING
PAIN LOCATION - PAIN QUALITY: ACHING
HIGHEST PAIN SEVERITY IN PAST 24 HOURS: 5/10
LOWEST PAIN SEVERITY IN PAST 24 HOURS: 1/10
PAIN: 1
PAIN LOCATION - PAIN SEVERITY: 3/10
PAIN LOCATION - PAIN FREQUENCY: INTERMITTENT
MUSCLE WEAKNESS: 1
SUBJECTIVE PAIN PROGRESSION: UNCHANGED
PAIN LOCATION: RIGHT KNEE
PERSON REPORTING PAIN: PATIENT
PAIN SEVERITY GOAL: 1/10

## 2024-07-02 ASSESSMENT — ACTIVITIES OF DAILY LIVING (ADL)
AMBULATION ASSISTANCE ON FLAT SURFACES: 1
AMBULATION_DISTANCE/DURATION_TOLERATED: 100 FT X 2
AMBULATION ASSISTANCE: STAND BY ASSIST

## 2024-07-04 ENCOUNTER — HOME CARE VISIT (OUTPATIENT)
Dept: HOME HEALTH SERVICES | Facility: HOME HEALTH | Age: 79
End: 2024-07-04
Payer: MEDICARE

## 2024-07-04 VITALS
RESPIRATION RATE: 14 BRPM | DIASTOLIC BLOOD PRESSURE: 72 MMHG | TEMPERATURE: 98.3 F | HEART RATE: 82 BPM | SYSTOLIC BLOOD PRESSURE: 140 MMHG | OXYGEN SATURATION: 93 %

## 2024-07-04 PROCEDURE — G0151 HHCP-SERV OF PT,EA 15 MIN: HCPCS | Mod: HHH

## 2024-07-04 SDOH — HEALTH STABILITY: PHYSICAL HEALTH: PHYSICAL EXERCISE: SEATED

## 2024-07-04 SDOH — HEALTH STABILITY: PHYSICAL HEALTH: EXERCISE ACTIVITIES SETS: 1

## 2024-07-04 SDOH — HEALTH STABILITY: PHYSICAL HEALTH: PHYSICAL EXERCISE: 15

## 2024-07-04 SDOH — HEALTH STABILITY: PHYSICAL HEALTH: EXERCISE ACTIVITY: HIP EXTENSION

## 2024-07-04 SDOH — HEALTH STABILITY: PHYSICAL HEALTH

## 2024-07-04 SDOH — HEALTH STABILITY: PHYSICAL HEALTH: RESISTANCE: ORANGE THERABAND

## 2024-07-04 SDOH — HEALTH STABILITY: PHYSICAL HEALTH: EXERCISE ACTIVITY: ANKLE DF/PF

## 2024-07-04 SDOH — HEALTH STABILITY: PHYSICAL HEALTH: EXERCISE ACTIVITY: HIP ABDUCTION

## 2024-07-04 SDOH — HEALTH STABILITY: PHYSICAL HEALTH: EXERCISE ACTIVITY: HS CURLS

## 2024-07-04 SDOH — HEALTH STABILITY: PHYSICAL HEALTH: EXERCISE ACTIVITY: MARCHING

## 2024-07-04 SDOH — HEALTH STABILITY: PHYSICAL HEALTH: EXERCISE TYPE: LE EXERCISES

## 2024-07-04 SDOH — HEALTH STABILITY: PHYSICAL HEALTH: EXERCISE ACTIVITY: LAQ

## 2024-07-04 ASSESSMENT — ENCOUNTER SYMPTOMS
PAIN LOCATION: RIGHT KNEE
PERSON REPORTING PAIN: PATIENT
PAIN LOCATION - PAIN SEVERITY: 2/10
PAIN LOCATION - PAIN QUALITY: ACHING
PAIN SEVERITY GOAL: 0/10
MUSCLE WEAKNESS: 1
PAIN LOCATION - EXACERBATING FACTORS: WALKING
LOWEST PAIN SEVERITY IN PAST 24 HOURS: 2/10
HIGHEST PAIN SEVERITY IN PAST 24 HOURS: 8/10
PAIN LOCATION - PAIN FREQUENCY: INTERMITTENT
PAIN: 1
SUBJECTIVE PAIN PROGRESSION: GRADUALLY IMPROVING

## 2024-07-04 ASSESSMENT — ACTIVITIES OF DAILY LIVING (ADL)
AMBULATION ASSISTANCE: STAND BY ASSIST
CURRENT_FUNCTION: STAND BY ASSIST
AMBULATION_DISTANCE/DURATION_TOLERATED: 75 FT
AMBULATION ASSISTANCE ON FLAT SURFACES: 1

## 2024-07-09 ENCOUNTER — HOME CARE VISIT (OUTPATIENT)
Dept: HOME HEALTH SERVICES | Facility: HOME HEALTH | Age: 79
End: 2024-07-09
Payer: MEDICARE

## 2024-07-09 VITALS
RESPIRATION RATE: 14 BRPM | HEART RATE: 89 BPM | TEMPERATURE: 97.9 F | OXYGEN SATURATION: 95 % | DIASTOLIC BLOOD PRESSURE: 70 MMHG | SYSTOLIC BLOOD PRESSURE: 110 MMHG

## 2024-07-09 PROCEDURE — G0151 HHCP-SERV OF PT,EA 15 MIN: HCPCS | Mod: HHH

## 2024-07-09 SDOH — HEALTH STABILITY: PHYSICAL HEALTH: RESISTANCE: ORANGE THERABAND

## 2024-07-09 SDOH — HEALTH STABILITY: PHYSICAL HEALTH: PHYSICAL EXERCISE: 15

## 2024-07-09 SDOH — HEALTH STABILITY: PHYSICAL HEALTH: EXERCISE ACTIVITY: MARCHING

## 2024-07-09 SDOH — HEALTH STABILITY: PHYSICAL HEALTH: EXERCISE TYPE: LE EXERCISES

## 2024-07-09 SDOH — HEALTH STABILITY: PHYSICAL HEALTH: EXERCISE ACTIVITIES SETS: 1

## 2024-07-09 SDOH — HEALTH STABILITY: PHYSICAL HEALTH: PHYSICAL EXERCISE: SEATED

## 2024-07-09 SDOH — HEALTH STABILITY: PHYSICAL HEALTH

## 2024-07-09 SDOH — HEALTH STABILITY: PHYSICAL HEALTH: EXERCISE ACTIVITY: ANKLE DF/PF

## 2024-07-09 SDOH — HEALTH STABILITY: PHYSICAL HEALTH: EXERCISE ACTIVITY: LAQ

## 2024-07-09 SDOH — HEALTH STABILITY: PHYSICAL HEALTH: EXERCISE ACTIVITY: HS CURLS

## 2024-07-09 SDOH — HEALTH STABILITY: PHYSICAL HEALTH: EXERCISE ACTIVITY: HIP EXTENSION

## 2024-07-09 SDOH — HEALTH STABILITY: PHYSICAL HEALTH: EXERCISE ACTIVITY: HIP ABDUCTION

## 2024-07-09 ASSESSMENT — ENCOUNTER SYMPTOMS
SUBJECTIVE PAIN PROGRESSION: UNCHANGED
LOWEST PAIN SEVERITY IN PAST 24 HOURS: 3/10
PAIN LOCATION: RIGHT KNEE
HIGHEST PAIN SEVERITY IN PAST 24 HOURS: 6/10
PERSON REPORTING PAIN: PATIENT
PAIN LOCATION - PAIN SEVERITY: 6/10
PAIN LOCATION - PAIN QUALITY: ACHING
MUSCLE WEAKNESS: 1
PAIN: 1
PAIN SEVERITY GOAL: 0/10
PAIN LOCATION - PAIN FREQUENCY: CONSTANT
PAIN LOCATION - EXACERBATING FACTORS: WALKING, STANDING

## 2024-07-09 ASSESSMENT — ACTIVITIES OF DAILY LIVING (ADL)
AMBULATION_DISTANCE/DURATION_TOLERATED: LE EXERCISES
AMBULATION ASSISTANCE ON FLAT SURFACES: 1

## 2024-07-11 ENCOUNTER — HOME CARE VISIT (OUTPATIENT)
Dept: HOME HEALTH SERVICES | Facility: HOME HEALTH | Age: 79
End: 2024-07-11
Payer: MEDICARE

## 2024-07-11 VITALS
TEMPERATURE: 97.9 F | DIASTOLIC BLOOD PRESSURE: 72 MMHG | RESPIRATION RATE: 14 BRPM | SYSTOLIC BLOOD PRESSURE: 124 MMHG | OXYGEN SATURATION: 97 % | HEART RATE: 81 BPM

## 2024-07-11 PROCEDURE — G0151 HHCP-SERV OF PT,EA 15 MIN: HCPCS | Mod: HHH

## 2024-07-11 SDOH — HEALTH STABILITY: PHYSICAL HEALTH: EXERCISE ACTIVITIES SETS: 1

## 2024-07-11 SDOH — HEALTH STABILITY: PHYSICAL HEALTH: EXERCISE ACTIVITY: MARCHING

## 2024-07-11 SDOH — HEALTH STABILITY: PHYSICAL HEALTH: PHYSICAL EXERCISE: SEATED

## 2024-07-11 SDOH — HEALTH STABILITY: PHYSICAL HEALTH: EXERCISE ACTIVITY: HIP EXTENSION

## 2024-07-11 SDOH — HEALTH STABILITY: PHYSICAL HEALTH: EXERCISE TYPE: LE EXERCISES

## 2024-07-11 SDOH — HEALTH STABILITY: PHYSICAL HEALTH: PHYSICAL EXERCISE: 15

## 2024-07-11 SDOH — HEALTH STABILITY: PHYSICAL HEALTH

## 2024-07-11 SDOH — HEALTH STABILITY: PHYSICAL HEALTH: RESISTANCE: ORANGE THERABAND

## 2024-07-11 SDOH — HEALTH STABILITY: PHYSICAL HEALTH: EXERCISE ACTIVITY: ANKLE DF/PF

## 2024-07-11 SDOH — HEALTH STABILITY: PHYSICAL HEALTH: EXERCISE ACTIVITY: HS CURLS

## 2024-07-11 SDOH — HEALTH STABILITY: PHYSICAL HEALTH: EXERCISE ACTIVITY: LAQ

## 2024-07-11 SDOH — HEALTH STABILITY: PHYSICAL HEALTH: EXERCISE ACTIVITY: HIP ABDUCTION

## 2024-07-11 ASSESSMENT — ENCOUNTER SYMPTOMS
PAIN LOCATION: RIGHT KNEE
SUBJECTIVE PAIN PROGRESSION: GRADUALLY IMPROVING
LOWEST PAIN SEVERITY IN PAST 24 HOURS: 2/10
PAIN LOCATION - PAIN SEVERITY: 4/10
MUSCLE WEAKNESS: 1
PAIN: 1
PAIN LOCATION - PAIN FREQUENCY: INTERMITTENT
HIGHEST PAIN SEVERITY IN PAST 24 HOURS: 4/10
PAIN LOCATION - EXACERBATING FACTORS: WALKING, STANDING
PERSON REPORTING PAIN: PATIENT
PAIN SEVERITY GOAL: 0/10

## 2024-07-11 ASSESSMENT — ACTIVITIES OF DAILY LIVING (ADL)
AMBULATION_DISTANCE/DURATION_TOLERATED: LE EXERCISES
AMBULATION ASSISTANCE ON FLAT SURFACES: 1

## 2024-07-15 ENCOUNTER — HOME CARE VISIT (OUTPATIENT)
Dept: HOME HEALTH SERVICES | Facility: HOME HEALTH | Age: 79
End: 2024-07-15
Payer: MEDICARE

## 2024-07-15 VITALS
SYSTOLIC BLOOD PRESSURE: 126 MMHG | RESPIRATION RATE: 18 BRPM | TEMPERATURE: 98.1 F | HEART RATE: 72 BPM | DIASTOLIC BLOOD PRESSURE: 76 MMHG | OXYGEN SATURATION: 96 %

## 2024-07-15 PROCEDURE — G0157 HHC PT ASSISTANT EA 15: HCPCS | Mod: CQ,HHH

## 2024-07-15 ASSESSMENT — ENCOUNTER SYMPTOMS
PAIN LOCATION - PAIN QUALITY: THROBBING
PAIN LOCATION - PAIN FREQUENCY: INTERMITTENT
PAIN SEVERITY GOAL: 0/10
SUBJECTIVE PAIN PROGRESSION: GRADUALLY IMPROVING
PAIN LOCATION - EXACERBATING FACTORS: EXTENDED ACTIVITY
PAIN LOCATION - RELIEVING FACTORS: ICE, REST
HIGHEST PAIN SEVERITY IN PAST 24 HOURS: 5/10
PAIN LOCATION - PAIN SEVERITY: 3/10
LOWEST PAIN SEVERITY IN PAST 24 HOURS: 1/10
PAIN: 1
PAIN LOCATION: RIGHT KNEE
PERSON REPORTING PAIN: PATIENT

## 2024-07-19 ENCOUNTER — HOME CARE VISIT (OUTPATIENT)
Dept: HOME HEALTH SERVICES | Facility: HOME HEALTH | Age: 79
End: 2024-07-19
Payer: MEDICARE

## 2024-07-19 PROCEDURE — G0151 HHCP-SERV OF PT,EA 15 MIN: HCPCS | Mod: HHH

## 2024-07-27 ASSESSMENT — GAIT ASSESSMENTS
PATH: 1 - MILD/MODERATE DEVIATION OR USES WALKING AID
TRUNK: 0 - MARKED SWAY OR USES WALKING AID
PATH SCORE: 1
GAIT SCORE: 9
TRUNK SCORE: 0
STEP SYMMETRY: 1 - RIGHT AND LEFT STEP LENGTH APPEAR EQUAL
INITIATION OF GAIT IMMEDIATELY AFTER GO: 1 - NO HESITANCY
WALKING STANCE: 1 - HEELS ALMOST TOUCHING WHILE WALKING
BALANCE AND GAIT SCORE: 19
STEP CONTINUITY: 1 - STEPS APPEAR CONTINUOUS

## 2024-07-27 ASSESSMENT — BALANCE ASSESSMENTS
STANDING BALANCE: 1 - STEADY BUT WIDE STANCE AND USES CANE OR OTHER SUPPORT
ARISES: 1 - ABLE, USES ARMS TO HELP
IMMEDIATE STANDING BALANCE FIRST 5 SECONDS: 1 - STEADY BUT USES WALKER OR OTHER SUPPORT
TURNING 360 DEGREES STEPS: 1 - CONTINUOUS STEPS
EYES CLOSED AT MAXIMUM POSITION NUDGED: 0 - UNSTEADY
ARISING SCORE: 1
BALANCE SCORE: 10
NUDGED SCORE: 1
SITTING DOWN: 1 - USES ARMS OR NOT SMOOTH MOTION
SITTING BALANCE: 1 - STEADY, SAFE
ATTEMPTS TO ARISE: 2 - ABLE TO RISE, ONE ATTEMPT
NUDGED: 1 - STAGGERS, GRABS, CATCHES SELF

## 2024-07-27 ASSESSMENT — ENCOUNTER SYMPTOMS
DENIES PAIN: 1
PERSON REPORTING PAIN: PATIENT

## 2024-07-27 ASSESSMENT — ACTIVITIES OF DAILY LIVING (ADL)
OASIS_M1830: 01
HOME_HEALTH_OASIS: 00

## 2024-08-02 DIAGNOSIS — I11.0 HYPERTENSIVE HEART DISEASE WITH HEART FAILURE (MULTI): Primary | ICD-10-CM

## 2024-08-02 RX ORDER — HYDROCHLOROTHIAZIDE 12.5 MG/1
12.5 CAPSULE ORAL DAILY
Qty: 90 CAPSULE | Refills: 0 | Status: SHIPPED | OUTPATIENT
Start: 2024-08-02

## 2024-08-23 ENCOUNTER — APPOINTMENT (OUTPATIENT)
Dept: CARDIOLOGY | Facility: CLINIC | Age: 79
End: 2024-08-23
Payer: MEDICARE

## 2024-09-17 ENCOUNTER — APPOINTMENT (OUTPATIENT)
Dept: ORTHOPEDIC SURGERY | Facility: CLINIC | Age: 79
End: 2024-09-17
Payer: MEDICARE

## 2024-09-20 DIAGNOSIS — I20.9 ANGINA PECTORIS, UNSPECIFIED: ICD-10-CM

## 2024-09-20 DIAGNOSIS — I10 BENIGN ESSENTIAL HYPERTENSION: ICD-10-CM

## 2024-09-20 NOTE — TELEPHONE ENCOUNTER
Called patient back and was able to make appointment. She was unsure if she would be able to make appointment since she doesn't drive due to knee surgery. She was going to see if she could find a ride and took call back number.

## 2024-09-20 NOTE — TELEPHONE ENCOUNTER
Called patient to schedule appointment. Patient informed she does not drive and she just had knee surgery. She asked we call back in 10 minutes because she was occupied currently.

## 2024-09-20 NOTE — TELEPHONE ENCOUNTER
Received request for prescription refills for patient.   Patient follows with Ruba and Dr. Matute, and Dr. Fitzpatrick     Request is for Imdur 30mg  Is patient currently on medication yes    Last OV 3/1/24  Next OV no appt scheduled-routed to scheduling to schedule follow up appt.    Pended for signing and sent to provider

## 2024-09-24 RX ORDER — ISOSORBIDE MONONITRATE 30 MG/1
TABLET, EXTENDED RELEASE ORAL
Qty: 90 TABLET | Refills: 3 | Status: SHIPPED | OUTPATIENT
Start: 2024-09-24

## 2024-10-02 ENCOUNTER — APPOINTMENT (OUTPATIENT)
Dept: RADIOLOGY | Facility: HOSPITAL | Age: 79
End: 2024-10-02
Payer: MEDICARE

## 2024-10-02 ENCOUNTER — APPOINTMENT (OUTPATIENT)
Dept: CARDIOLOGY | Facility: HOSPITAL | Age: 79
End: 2024-10-02
Payer: MEDICARE

## 2024-10-02 ENCOUNTER — HOSPITAL ENCOUNTER (EMERGENCY)
Facility: HOSPITAL | Age: 79
Discharge: HOME | End: 2024-10-02
Attending: STUDENT IN AN ORGANIZED HEALTH CARE EDUCATION/TRAINING PROGRAM
Payer: MEDICARE

## 2024-10-02 VITALS
WEIGHT: 136 LBS | SYSTOLIC BLOOD PRESSURE: 143 MMHG | OXYGEN SATURATION: 99 % | TEMPERATURE: 97.7 F | RESPIRATION RATE: 18 BRPM | BODY MASS INDEX: 23.22 KG/M2 | HEIGHT: 64 IN | HEART RATE: 78 BPM | DIASTOLIC BLOOD PRESSURE: 78 MMHG

## 2024-10-02 DIAGNOSIS — J18.9 PNEUMONIA OF RIGHT LOWER LOBE DUE TO INFECTIOUS ORGANISM: Primary | ICD-10-CM

## 2024-10-02 LAB
ALBUMIN SERPL BCP-MCNC: 4.2 G/DL (ref 3.4–5)
ALP SERPL-CCNC: 88 U/L (ref 33–136)
ALT SERPL W P-5'-P-CCNC: 18 U/L (ref 7–45)
ANION GAP SERPL CALC-SCNC: 12 MMOL/L (ref 10–20)
APPEARANCE UR: CLEAR
AST SERPL W P-5'-P-CCNC: 21 U/L (ref 9–39)
BASOPHILS # BLD AUTO: 0.02 X10*3/UL (ref 0–0.1)
BASOPHILS NFR BLD AUTO: 0.3 %
BILIRUB SERPL-MCNC: 0.3 MG/DL (ref 0–1.2)
BILIRUB UR STRIP.AUTO-MCNC: NEGATIVE MG/DL
BNP SERPL-MCNC: 33 PG/ML (ref 0–99)
BUN SERPL-MCNC: 13 MG/DL (ref 6–23)
CALCIUM SERPL-MCNC: 9.5 MG/DL (ref 8.6–10.3)
CARDIAC TROPONIN I PNL SERPL HS: 3 NG/L (ref 0–13)
CARDIAC TROPONIN I PNL SERPL HS: 4 NG/L (ref 0–13)
CHLORIDE SERPL-SCNC: 98 MMOL/L (ref 98–107)
CO2 SERPL-SCNC: 28 MMOL/L (ref 21–32)
COLOR UR: NORMAL
CREAT SERPL-MCNC: 0.72 MG/DL (ref 0.5–1.05)
EGFRCR SERPLBLD CKD-EPI 2021: 85 ML/MIN/1.73M*2
EOSINOPHIL # BLD AUTO: 0.1 X10*3/UL (ref 0–0.4)
EOSINOPHIL NFR BLD AUTO: 1.3 %
ERYTHROCYTE [DISTWIDTH] IN BLOOD BY AUTOMATED COUNT: 15.1 % (ref 11.5–14.5)
GLUCOSE SERPL-MCNC: 107 MG/DL (ref 74–99)
GLUCOSE UR STRIP.AUTO-MCNC: NORMAL MG/DL
HCT VFR BLD AUTO: 39.4 % (ref 36–46)
HGB BLD-MCNC: 13.1 G/DL (ref 12–16)
IMM GRANULOCYTES # BLD AUTO: 0.03 X10*3/UL (ref 0–0.5)
IMM GRANULOCYTES NFR BLD AUTO: 0.4 % (ref 0–0.9)
KETONES UR STRIP.AUTO-MCNC: NEGATIVE MG/DL
LACTATE SERPL-SCNC: 1.2 MMOL/L (ref 0.4–2)
LEUKOCYTE ESTERASE UR QL STRIP.AUTO: NEGATIVE
LIPASE SERPL-CCNC: 22 U/L (ref 9–82)
LYMPHOCYTES # BLD AUTO: 1.83 X10*3/UL (ref 0.8–3)
LYMPHOCYTES NFR BLD AUTO: 23.1 %
MAGNESIUM SERPL-MCNC: 1.8 MG/DL (ref 1.6–2.4)
MCH RBC QN AUTO: 29.4 PG (ref 26–34)
MCHC RBC AUTO-ENTMCNC: 33.2 G/DL (ref 32–36)
MCV RBC AUTO: 88 FL (ref 80–100)
MONOCYTES # BLD AUTO: 0.71 X10*3/UL (ref 0.05–0.8)
MONOCYTES NFR BLD AUTO: 9 %
NEUTROPHILS # BLD AUTO: 5.22 X10*3/UL (ref 1.6–5.5)
NEUTROPHILS NFR BLD AUTO: 65.9 %
NITRITE UR QL STRIP.AUTO: NEGATIVE
NRBC BLD-RTO: 0 /100 WBCS (ref 0–0)
PH UR STRIP.AUTO: 6.5 [PH]
PLATELET # BLD AUTO: 294 X10*3/UL (ref 150–450)
POTASSIUM SERPL-SCNC: 4 MMOL/L (ref 3.5–5.3)
PROT SERPL-MCNC: 7.1 G/DL (ref 6.4–8.2)
PROT UR STRIP.AUTO-MCNC: NEGATIVE MG/DL
RBC # BLD AUTO: 4.46 X10*6/UL (ref 4–5.2)
RBC # UR STRIP.AUTO: NEGATIVE /UL
SODIUM SERPL-SCNC: 134 MMOL/L (ref 136–145)
SP GR UR STRIP.AUTO: 1.01
UROBILINOGEN UR STRIP.AUTO-MCNC: NORMAL MG/DL
WBC # BLD AUTO: 7.9 X10*3/UL (ref 4.4–11.3)

## 2024-10-02 PROCEDURE — 71045 X-RAY EXAM CHEST 1 VIEW: CPT

## 2024-10-02 PROCEDURE — 84484 ASSAY OF TROPONIN QUANT: CPT | Performed by: NURSE PRACTITIONER

## 2024-10-02 PROCEDURE — 96367 TX/PROPH/DG ADDL SEQ IV INF: CPT

## 2024-10-02 PROCEDURE — 83880 ASSAY OF NATRIURETIC PEPTIDE: CPT | Performed by: NURSE PRACTITIONER

## 2024-10-02 PROCEDURE — 36415 COLL VENOUS BLD VENIPUNCTURE: CPT | Performed by: NURSE PRACTITIONER

## 2024-10-02 PROCEDURE — 71045 X-RAY EXAM CHEST 1 VIEW: CPT | Mod: FOREIGN READ | Performed by: RADIOLOGY

## 2024-10-02 PROCEDURE — 80053 COMPREHEN METABOLIC PANEL: CPT | Performed by: NURSE PRACTITIONER

## 2024-10-02 PROCEDURE — 83605 ASSAY OF LACTIC ACID: CPT | Performed by: NURSE PRACTITIONER

## 2024-10-02 PROCEDURE — 85025 COMPLETE CBC W/AUTO DIFF WBC: CPT | Performed by: NURSE PRACTITIONER

## 2024-10-02 PROCEDURE — 74176 CT ABD & PELVIS W/O CONTRAST: CPT

## 2024-10-02 PROCEDURE — 83735 ASSAY OF MAGNESIUM: CPT | Performed by: NURSE PRACTITIONER

## 2024-10-02 PROCEDURE — 2500000004 HC RX 250 GENERAL PHARMACY W/ HCPCS (ALT 636 FOR OP/ED): Performed by: NURSE PRACTITIONER

## 2024-10-02 PROCEDURE — 96365 THER/PROPH/DIAG IV INF INIT: CPT

## 2024-10-02 PROCEDURE — 99285 EMERGENCY DEPT VISIT HI MDM: CPT | Mod: 25

## 2024-10-02 PROCEDURE — 81003 URINALYSIS AUTO W/O SCOPE: CPT | Performed by: NURSE PRACTITIONER

## 2024-10-02 PROCEDURE — 83690 ASSAY OF LIPASE: CPT | Performed by: NURSE PRACTITIONER

## 2024-10-02 PROCEDURE — 93005 ELECTROCARDIOGRAM TRACING: CPT

## 2024-10-02 RX ORDER — CEFTRIAXONE 1 G/50ML
1 INJECTION, SOLUTION INTRAVENOUS ONCE
Status: COMPLETED | OUTPATIENT
Start: 2024-10-02 | End: 2024-10-02

## 2024-10-02 RX ORDER — DOXYCYCLINE 100 MG/1
100 CAPSULE ORAL 2 TIMES DAILY
Qty: 14 CAPSULE | Refills: 0 | Status: SHIPPED | OUTPATIENT
Start: 2024-10-02 | End: 2024-10-09

## 2024-10-02 RX ORDER — AMOXICILLIN AND CLAVULANATE POTASSIUM 875; 125 MG/1; MG/1
1 TABLET, FILM COATED ORAL EVERY 12 HOURS
Qty: 14 TABLET | Refills: 0 | Status: SHIPPED | OUTPATIENT
Start: 2024-10-02 | End: 2024-10-09

## 2024-10-02 ASSESSMENT — LIFESTYLE VARIABLES
HAVE PEOPLE ANNOYED YOU BY CRITICIZING YOUR DRINKING: NO
EVER HAD A DRINK FIRST THING IN THE MORNING TO STEADY YOUR NERVES TO GET RID OF A HANGOVER: NO
EVER FELT BAD OR GUILTY ABOUT YOUR DRINKING: NO
HAVE YOU EVER FELT YOU SHOULD CUT DOWN ON YOUR DRINKING: NO
TOTAL SCORE: 0

## 2024-10-02 ASSESSMENT — PAIN - FUNCTIONAL ASSESSMENT: PAIN_FUNCTIONAL_ASSESSMENT: 0-10

## 2024-10-02 ASSESSMENT — PAIN SCALES - GENERAL: PAINLEVEL_OUTOF10: 0 - NO PAIN

## 2024-10-02 ASSESSMENT — PAIN DESCRIPTION - DESCRIPTORS: DESCRIPTORS: STABBING

## 2024-10-02 NOTE — ED TRIAGE NOTES
Pt bib EMS for chest pain that started today.  Pt was given 100 mikes of fentanyl, 4 mg of Zofran, and 2 baby aspirin.  Pt denies pain when relaxed, but endorses 10/10 pain when moving.  Pain is a stabbing located in the mid axillary region of the central chest.  Pt is A&Ox4, on RA, with a history of COPD.  Mild hypertension all other VS stable

## 2024-10-02 NOTE — ED PROVIDER NOTES
HPI   Chief Complaint   Patient presents with    Chest Pain       Patient is a 79-year-old female with past medical history of COPD, hypertension, hyperlipidemia, GERD, anxiety, and anemia, who presents ED today due to right flank/lower chest pain.  Pain started this afternoon.  Patient did have a fall a little over a week ago but did not seem to have any injuries from this.  Patient denies any active chest pain or shortness of breath.  Patient denies any palpitations, lower extremity swelling, congestion, cough, fevers, chills, nausea, vomiting, diarrhea, constipation, abdominal pain, headache, dizziness, focal numbness or weakness.  Patient does states she has had some increased urinary frequency and incontinence over her baseline.      History provided by:  Patient   used: No            Patient History   Past Medical History:   Diagnosis Date    Acute candidiasis of vulva and vagina 10/22/2018    Yeast infection of the vagina    Anemia     Anxiety     Anxiety disorder, unspecified     Anxiety and depression    Arrhythmia     Arthritis     Atrial fibrillation (Multi)     Body mass index (BMI) 29.0-29.9, adult 03/04/2022    BMI 29.0-29.9,adult    Bunion of left foot     Bunion, left    Candidiasis of skin and nail 10/22/2018    Yeast infection of the skin    COPD (chronic obstructive pulmonary disease) (Multi)     Coronary artery disease     COVID-19 04/25/2022    COVID-19 virus infection    Gastro-esophageal reflux disease without esophagitis     Chronic GERD    Heart disease     Hyperlipidemia     Hypertension     Joint pain     Limited mobility     Nontoxic multinodular goiter     Multinodular goiter    Overweight 03/04/2022    Overweight    Personal history of other diseases of the digestive system 01/17/2019    History of gastroesophageal reflux (GERD)    Personal history of other diseases of the musculoskeletal system and connective tissue     History of osteopenia    Personal history of  other diseases of the nervous system and sense organs     History of hearing loss    Personal history of other endocrine, nutritional and metabolic disease     History of hyperlipidemia    Personal history of other infectious and parasitic diseases     History of herpes zoster    Pneumonia, unspecified organism     Community acquired pneumonia    Spondylosis without myelopathy or radiculopathy, cervical region     Osteoarthritis of cervical spine    TIA (transient ischemic attack)     Urinary incontinence     Use of cane as ambulatory aid     Wears glasses      Past Surgical History:   Procedure Laterality Date    BLADDER SURGERY      BREAST LUMPECTOMY  09/28/2017    Left Breast Lumpectomy    BUNIONECTOMY      CARDIAC CATHETERIZATION Left 02/23/2024    Procedure: Left Heart Cath, With LV;  Surgeon: Manfred Fitzpatrick MD;  Location: [facility];  Service: Cardiovascular;  Laterality: Left;  Left    CHOLECYSTECTOMY  09/28/2017    Cholecystectomy    COLONOSCOPY      CORONARY ANGIOPLASTY      CORONARY STENT PLACEMENT      CT HEAD ANGIO W AND WO IV CONTRAST  04/10/2019    CT HEAD ANGIO W AND WO IV CONTRAST 4/10/2019 [facility]    FOOT SURGERY      KNEE ARTHROSCOPY W/ DEBRIDEMENT  09/28/2017    Arthroscopy Knee Right    MR HEAD ANGIO WO IV CONTRAST  09/05/2019    MR HEAD ANGIO WO IV CONTRAST 9/5/2019 [facility]    MR HEAD ANGIO WO IV CONTRAST  01/19/2020    MR HEAD ANGIO WO IV CONTRAST 1/19/2020 [email or web address]    MR NECK ANGIO WO IV CONTRAST  01/19/2020    MR NECK ANGIO WO IV CONTRAST 1/19/2020 [facility] [email or web address] CL[facility]ICAL [facility]GAC[facility]    OTHER SURGICAL HISTORY  09/28/2017    Surgery Foot Amputation Metatarsal And Toe    OTHER SURGICAL HISTORY  09/07/2022    Loop recorder insertion    THYROID SURGERY       Family History   Problem Relation Name Age of Onset    Arthritis Mother      Heart failure Mother      Esophageal cancer Mother      Rheumatic fever Mother      Other (heart problem) Mother      Esophageal  cancer Father      Other (cardiac disorder) Son       Social History     Tobacco Use    Smoking status: Former     Current packs/day: 0.00     Average packs/day: 1 pack/day for 10.0 years (10.0 ttl pk-yrs)     Types: Cigarettes     Start date:      Quit date:      Years since quittin.7    Smokeless tobacco: Never   Vaping Use    Vaping status: Never Used   Substance Use Topics    Alcohol use: Never    Drug use: Never       Physical Exam   ED Triage Vitals [10/02/24 1656]   Temp Heart Rate Respirations BP   -- 99 16 174/85      Pulse Ox Temp src Heart Rate Source Patient Position   (!) 89 % -- Monitor --      BP Location FiO2 (%)     -- --       Physical Exam  Vitals and nursing note reviewed.   Constitutional:       General: She is not in acute distress.     Appearance: She is well-developed.   HENT:      Head: Normocephalic and atraumatic.   Eyes:      Conjunctiva/sclera: Conjunctivae normal.   Cardiovascular:      Rate and Rhythm: Normal rate and regular rhythm.      Heart sounds: No murmur heard.  Pulmonary:      Effort: Pulmonary effort is normal. No respiratory distress.      Breath sounds: Examination of the right-upper field reveals wheezing. Examination of the left-upper field reveals wheezing. Wheezing present.   Abdominal:      Palpations: Abdomen is soft.      Tenderness: There is no abdominal tenderness. There is right CVA tenderness. There is no left CVA tenderness.   Musculoskeletal:         General: No swelling.      Cervical back: Neck supple.   Skin:     General: Skin is warm and dry.      Capillary Refill: Capillary refill takes less than 2 seconds.   Neurological:      Mental Status: She is alert.   Psychiatric:         Mood and Affect: Mood normal.           ED Course & MDM   ED Course as of 10/02/24 2208   Wed Oct 02, 2024   1803 ECG 12 Lead  EKG, my read, 1655  Normal sinus rhythm, no acute ST elevation or depression.  Ventricular rate-71 BPM [WS]   1812 CBC and Auto  Differential(!)  CBC stable, no leukocytosis, anemia, or thrombocytopenia [WS]   1836 B-Type Natriuretic Peptide  Not elevated, unlikely heart failure [WS]   1837 Lipase  Not elevated, unlikely pancreatitis [WS]   1837 Magnesium  Normal [WS]   1837 Lactate  Not elevated [WS]   1837 Troponin I Series, High Sensitivity (0, 1 HR)  Normal, unlikely ACS, will repeat [WS]   1837 Comprehensive Metabolic Panel(!)  Slight hyponatremia, no other electrolyte abnormalities, kidney injury, or liver pathology. [WS]   1907 XR chest 1 view  Possible subtle infiltrate at the right lung base although this is not  definite.  The lungs otherwise are clear   [WS]   1957 Troponin I Series, High Sensitivity (0, 1 HR)  Repeat also normal, unlikely ACS [WS]   2056 CT abdomen pelvis wo IV contrast  No acute inflammatory changes in the abdomen or pelvis.  No renal or  ureteral calculi.  No hydronephrosis or ureterectasis.  Fatty liver morphology.  Mild to moderate colonic stool.  Sigmoid colon diverticulosis without evidence of diverticulitis.   [WS]   2204 Urinalysis with Reflex Culture and Microscopic  No evidence of UTI. [WS]      ED Course User Index  [WS] Krunal Humphrey, APRN-CNP         Diagnoses as of 10/02/24 2208   Pneumonia of right lower lobe due to infectious organism                 No data recorded     [place] Coma Scale Score: 15 (10/02/24 1659 : Ever Lees RN)                           Medical Decision Making  Differential diagnosis: ACS, NSTEMI, arrhythmia, GERD, pneumonia, heart failure, electrolyte normality, anemia, liver pathology, pancreatitis, cholecystitis, pyelonephritis, kidney stone.  Patient's vital signs are stable, she is afebrile.  Initial oxygenation listed was 89% however they were having trouble obtaining a true waveform and when we placed it in an appropriate area her oxygenation was normal for her COPD.  Patient's EKG did not show any evidence of ischemic changes or acute ST elevation concerning for  STEMI.  CBC was stable without leukocytosis, anemia, or thrombocytopenia.  BNP was not elevated, it was unlikely heart failure as cause for symptoms.  Lipase was not elevated, unlikely pancreatitis as cause of her flank pain.  Magnesium was normal.  Lactate was not elevated.  Troponin initially was normal and repeat was also normal, unlikely ACS as cause of patient's symptoms.  CMP showed a slight hyponatremia without other significant electrolyte abnormalities, kidney injury, or liver pathology.  Patient's chest x-ray did show a possible subtle infiltrate in the right lung base, remaining lung fields are clear, this is consistent with patient's location of discomfort and symptoms.  CT showed no acute anti-inflammatory process in the abdomen or pelvis,-year-old ureteral calculi.  Does have diverticulosis without diverticulitis.  Urinalysis was negative for UTI.  Patient was treated with IV antibiotics ceftriaxone and azithromycin will be treated with doxycycline and Augmentin for home.  Patient had a walking pulse ox which showed normal oxygenation I do believe that she is safe for discharge home.    Return to ED precautions were discussed with patient and patient verbalized understanding of these.      I discussed the differential, results and discharge plan with the patient.  I emphasized the importance of follow-up with the physician I referred them to in the timeframe recommended.  I explained reasons for the them to return to the Emergency Department. Additional verbal discharge instructions were also given and discussed with them to supplement those generated by the EMR. We also discussed medications that were prescribed (if any) and appropriate use of OTC medications including common side effects and interactions. All questions were addressed.  They understand return precautions and discharge instructions. They expressed understanding.            Amount and/or Complexity of Data Reviewed  Labs: ordered.  Decision-making details documented in ED Course.  Radiology: ordered and independent interpretation performed. Decision-making details documented in ED Course.  ECG/medicine tests: ordered and independent interpretation performed. Decision-making details documented in ED Course.    Risk  OTC drugs.  Prescription drug management.  Decision regarding hospitalization.        Procedure  Procedures     Krunal Humphrey, RUSTY-CNP  10/02/24 8164

## 2024-10-03 LAB
ATRIAL RATE: 71 BPM
HOLD SPECIMEN: NORMAL
P AXIS: 77 DEGREES
P OFFSET: 170 MS
P ONSET: 143 MS
PR INTERVAL: 162 MS
Q ONSET: 224 MS
QRS COUNT: 12 BEATS
QRS DURATION: 68 MS
QT INTERVAL: 426 MS
QTC CALCULATION(BAZETT): 462 MS
QTC FREDERICIA: 450 MS
R AXIS: 9 DEGREES
T AXIS: 68 DEGREES
T OFFSET: 437 MS
VENTRICULAR RATE: 71 BPM

## 2024-10-09 ENCOUNTER — APPOINTMENT (OUTPATIENT)
Dept: CARDIOLOGY | Facility: CLINIC | Age: 79
End: 2024-10-09
Payer: MEDICARE

## 2024-10-15 ENCOUNTER — APPOINTMENT (OUTPATIENT)
Dept: ORTHOPEDIC SURGERY | Facility: CLINIC | Age: 79
End: 2024-10-15
Payer: MEDICARE

## 2024-10-15 ENCOUNTER — HOSPITAL ENCOUNTER (OUTPATIENT)
Dept: RADIOLOGY | Facility: CLINIC | Age: 79
Discharge: HOME | End: 2024-10-15
Payer: MEDICARE

## 2024-10-15 DIAGNOSIS — M17.11 PRIMARY OSTEOARTHRITIS OF RIGHT KNEE: ICD-10-CM

## 2024-10-15 DIAGNOSIS — M17.11 PRIMARY OSTEOARTHRITIS OF RIGHT KNEE: Primary | ICD-10-CM

## 2024-10-15 PROCEDURE — 1157F ADVNC CARE PLAN IN RCRD: CPT | Performed by: ORTHOPAEDIC SURGERY

## 2024-10-15 PROCEDURE — 1159F MED LIST DOCD IN RCRD: CPT | Performed by: ORTHOPAEDIC SURGERY

## 2024-10-15 PROCEDURE — 1036F TOBACCO NON-USER: CPT | Performed by: ORTHOPAEDIC SURGERY

## 2024-10-15 PROCEDURE — 73562 X-RAY EXAM OF KNEE 3: CPT | Mod: RT

## 2024-10-15 PROCEDURE — 1160F RVW MEDS BY RX/DR IN RCRD: CPT | Performed by: ORTHOPAEDIC SURGERY

## 2024-10-15 PROCEDURE — 99213 OFFICE O/P EST LOW 20 MIN: CPT | Performed by: ORTHOPAEDIC SURGERY

## 2024-10-15 PROCEDURE — 73562 X-RAY EXAM OF KNEE 3: CPT | Mod: RIGHT SIDE | Performed by: ORTHOPAEDIC SURGERY

## 2024-10-16 NOTE — PROGRESS NOTES
History of present illness    79-year-old female follow-up right total knee replacement from March 22 states doing okay she still feels weak she still has difficulty stand for prolonged period time and still uses a walker no numbness or tingling no fevers or chills and much less pain than she was in previously      Past medical , Surgical, Family and social history reviewed.      Physical exam  General: No acute distress and breathing comfortably.  Patient is pleasant and cooperative with the examination.    Extremity  Incision well-healed no sign infection full knee extension flexion 100 degrees no instability brisk cap refill compartments soft calves nontender    Diagnostics      ECG 12 Lead    Result Date: 10/3/2024  Normal sinus rhythm Septal infarct , age undetermined Abnormal ECG When compared with ECG of 13-MAR-2024 11:13, Nonspecific T wave abnormality no longer evident in Inferior leads See ED provider note for full interpretation and clinical correlation Confirmed by Debbie Higgins ([id number]) on 10/3/2024 11:18:18 AM    CT abdomen pelvis wo IV contrast    Result Date: 10/2/2024  STUDY: CT Abdomen and Pelvis without IV Contrast; 10/02/2024 7:13 pm INDICATION: Right flank pain. COMPARISON: None Available. ACCESSION NUMBER(S): [id number] ORDERING CLINICIAN: JAYSON NJ TECHNIQUE: CT of the abdomen and pelvis was performed.  Contiguous axial images were obtained at 3 mm slice thickness through the abdomen and pelvis. Coronal and sagittal reconstructions at 3 mm slice thickness were performed. No intravenous contrast was administered.  Automated mA/kV exposure control was utilized and patient examination was performed in strict accordance with principles of ALARA. FINDINGS: Please note that the evaluation of vessels, lymph nodes and organs is limited without intravenous contrast.  LOWER CHEST: No cardiomegaly.  No pericardial effusion.  Lung bases demonstrate chronic changes.   Coronary artery calcifications are a marker for coronary artery disease.  ABDOMEN:  LIVER: No hepatomegaly.  Smooth surface contour.  Liver demonstrates fatty morphology.  BILE DUCTS: No intrahepatic or extrahepatic biliary ductal dilatation.  GALLBLADDER: The gallbladder is unremarkable. STOMACH: No abnormalities identified.  PANCREAS: Pancreas demonstrates mild atrophy.  SPLEEN: No splenomegaly or focal splenic lesion.  Calcified granuloma are demonstrated within the spleen.  ADRENAL GLANDS: No thickening or nodules.  KIDNEYS AND URETERS: Kidneys are normal in size and location.  No renal or ureteral calculi.  PELVIS:  BLADDER: No abnormalities identified.  REPRODUCTIVE ORGANS: No abnormalities identified.  BOWEL: No abnormalities identified.  There is mild to moderate colonic stool.  There is sigmoid colon diverticulosis without evidence of diverticulitis.  Appendix is normal.  VESSELS: No abnormalities identified.  Abdominal aorta is normal in caliber.  PERITONEUM/RETROPERITONEUM/LYMPH NODES: No free fluid.  No pneumoperitoneum. No lymphadenopathy.  ABDOMINAL WALL: No abnormalities identified. SOFT TISSUES: No abnormalities identified.  BONES: Femoroacetabular joint alignment is maintained with mild arthrosis greater on the right.  No acute fracture or aggressive osseous lesion.    No acute inflammatory changes in the abdomen or pelvis.  No renal or ureteral calculi.  No hydronephrosis or ureterectasis. Fatty liver morphology. Mild to moderate colonic stool. Sigmoid colon diverticulosis without evidence of diverticulitis. Signed by Anish Mckee, DO    XR chest 1 view    Result Date: 10/2/2024  STUDY: Chest Radiograph;  10/02/2024 6:18 PM INDICATION: Chest pain. COMPARISON: 03/13/2024 CTA Chest for PE, XR Chest. 02/07/2024 XR Chest. ACCESSION NUMBER(S): [id number] ORDERING CLINICIAN: JAYSON NJ TECHNIQUE:  Frontal chest was obtained at 18:18 hours. FINDINGS: CARDIOMEDIASTINAL SILHOUETTE:  Cardiomediastinal silhouette is normal in size and configuration.  LUNGS: There is a questionable small infiltrate at the right base although this possibly may be related to overlying soft tissue.  The lungs otherwise are clear.  There is no visible effusion or pneumothorax.. Again seen is the implanted cardiac monitor device over the left chest.  ABDOMEN: No remarkable upper abdominal findings.  BONES: No acute osseous changes.    Possible subtle infiltrate at the right lung base although this is not definite.  The lungs otherwise are clear.. Signed by Santiago De Dios MD       Procedure  [ none]    Assessment  Status post total knee replacement right    Treatment plan  1.  Continue work range of motion strengthening continue to weight-bear as tolerated follow-up with me in 3 months with x-rays sooner if she has increased pain or discomfort.  2. [   ]  3. [   ]  4.  All of the patient's questions were answered.    Orders Placed This Encounter    XR knee right 3 views       This note was prepared using voice recognition software.  The details of this note are correct and have been reviewed, and corrected to the best of my ability.  Some grammatical areas may persist related to the Dragon software    Krunal Stone MD  Senior Attending Physician  [facility]  [facility] [place]    [phone]

## 2024-11-20 ENCOUNTER — OFFICE VISIT (OUTPATIENT)
Dept: PRIMARY CARE | Facility: CLINIC | Age: 79
End: 2024-11-20
Payer: MEDICARE

## 2024-11-20 VITALS
HEART RATE: 64 BPM | DIASTOLIC BLOOD PRESSURE: 74 MMHG | TEMPERATURE: 98.2 F | BODY MASS INDEX: 29.19 KG/M2 | OXYGEN SATURATION: 94 % | SYSTOLIC BLOOD PRESSURE: 128 MMHG | WEIGHT: 171 LBS | HEIGHT: 64 IN

## 2024-11-20 DIAGNOSIS — J00 ACUTE NASOPHARYNGITIS: ICD-10-CM

## 2024-11-20 DIAGNOSIS — J44.1 CHRONIC OBSTRUCTIVE PULMONARY DISEASE WITH ACUTE EXACERBATION (MULTI): Primary | ICD-10-CM

## 2024-11-20 DIAGNOSIS — M62.830 SPASM OF BACK MUSCLES: ICD-10-CM

## 2024-11-20 PROCEDURE — 1036F TOBACCO NON-USER: CPT | Performed by: INTERNAL MEDICINE

## 2024-11-20 PROCEDURE — 99214 OFFICE O/P EST MOD 30 MIN: CPT | Performed by: INTERNAL MEDICINE

## 2024-11-20 PROCEDURE — 1158F ADVNC CARE PLAN TLK DOCD: CPT | Performed by: INTERNAL MEDICINE

## 2024-11-20 PROCEDURE — 1157F ADVNC CARE PLAN IN RCRD: CPT | Performed by: INTERNAL MEDICINE

## 2024-11-20 PROCEDURE — G2211 COMPLEX E/M VISIT ADD ON: HCPCS | Performed by: INTERNAL MEDICINE

## 2024-11-20 PROCEDURE — 1160F RVW MEDS BY RX/DR IN RCRD: CPT | Performed by: INTERNAL MEDICINE

## 2024-11-20 PROCEDURE — 1123F ACP DISCUSS/DSCN MKR DOCD: CPT | Performed by: INTERNAL MEDICINE

## 2024-11-20 PROCEDURE — 3074F SYST BP LT 130 MM HG: CPT | Performed by: INTERNAL MEDICINE

## 2024-11-20 PROCEDURE — 3078F DIAST BP <80 MM HG: CPT | Performed by: INTERNAL MEDICINE

## 2024-11-20 PROCEDURE — 1159F MED LIST DOCD IN RCRD: CPT | Performed by: INTERNAL MEDICINE

## 2024-11-20 RX ORDER — IPRATROPIUM BROMIDE AND ALBUTEROL SULFATE 2.5; .5 MG/3ML; MG/3ML
3 SOLUTION RESPIRATORY (INHALATION) DAILY PRN
Qty: 180 ML | Refills: 3 | Status: SHIPPED | OUTPATIENT
Start: 2024-11-20

## 2024-11-20 RX ORDER — MELOXICAM 7.5 MG/1
7.5 TABLET ORAL DAILY PRN
Qty: 30 TABLET | Refills: 1 | Status: SHIPPED | OUTPATIENT
Start: 2024-11-20 | End: 2025-11-20

## 2024-11-20 RX ORDER — AZITHROMYCIN 500 MG/1
500 TABLET, FILM COATED ORAL DAILY
Qty: 7 TABLET | Refills: 0 | Status: SHIPPED | OUTPATIENT
Start: 2024-11-20 | End: 2024-11-27

## 2024-11-20 ASSESSMENT — ENCOUNTER SYMPTOMS
SPEECH DIFFICULTY: 0
FEVER: 0
COUGH: 1
DEPRESSION: 0
HEMATURIA: 0
BLOOD IN STOOL: 0
JOINT SWELLING: 0
LIGHT-HEADEDNESS: 0
PALPITATIONS: 0
CHEST TIGHTNESS: 0
EYE REDNESS: 0
ACTIVITY CHANGE: 0
STRIDOR: 0
BACK PAIN: 1

## 2024-11-20 ASSESSMENT — PATIENT HEALTH QUESTIONNAIRE - PHQ9
SUM OF ALL RESPONSES TO PHQ9 QUESTIONS 1 AND 2: 0
1. LITTLE INTEREST OR PLEASURE IN DOING THINGS: NOT AT ALL
2. FEELING DOWN, DEPRESSED OR HOPELESS: NOT AT ALL

## 2024-11-20 NOTE — LETTER
To Whom it May Concern ,    Rae Lundy has been under my care. Due to her many medical conditions she is not able to walk to the mailbox. Please have her mail delivered to her front door. If you have any questions please feel free to call my office at [phone] option [phone].     Thank you for your cooperation on this matter. Have a great day    Sincerely,      Dr. Bhanu Diaz MD  Internal medicine   Pediatrics

## 2024-11-20 NOTE — PROGRESS NOTES
Rae Lundy, pleasant 79 y.o. female was seen today:     Chief Complaint   Patient presents with    Back Pain    URI    Cough     Patient here for c/o cough, congestion, and right back pain since Saturday. Patient had pneumonia approximately a month ago.        VISIT SUMMERY:    aRe Lundy   With her daughter-in-law.  she was having cough, congestion last few days.  They went to the [place] on Sunday and she was coughing a lot.  However today she is doing better.  She did not have any fever or chills.  She had a history of COPD and pneumonia in the past.  Family is worried that whether she is getting her pneumonia back.  She also complained about pain in the thoracolumbar area especially below the left shoulder blades.  She is using walker sometimes she hunches over the walker.  This pain is more so with her muscular activity.  She did not have any fall or injury.  The pain does not get worse when she takes deep breath in and out.    Back Pain  Pertinent negatives include no chest pain or fever.   URI   Associated symptoms include coughing. Pertinent negatives include no chest pain or rash.   Cough  Pertinent negatives include no chest pain, eye redness, fever or rash.     January 2025 MEDICATIONS:   Current Outpatient Medications   Medication Instructions    acetaminophen (Tylenol 8 HOUR) 650 mg ER tablet 2 tablets, Daily    alendronate (FOSAMAX) 70 mg, oral, Once Weekly, Monday      aspirin 81 mg EC tablet 1 tablet, Daily    azithromycin (ZITHROMAX) 500 mg, oral, Daily    benzocaine-menthol lozenge 1 lozenge, Every 4 hours PRN    bisacodyl (DULCOLAX) 10 mg, Daily PRN    cyclobenzaprine (FLEXERIL) 5 mg, 3 times daily PRN    diphenhydrAMINE (BENADRYL) 12.5 mg, Every 6 hours PRN    ergocalciferol (Vitamin D-2) 1.25 MG (97055 UT) capsule 1 capsule, Once Weekly    ferrous sulfate, 325 mg ferrous sulfate, tablet 1 tablet, Daily with breakfast    hydroCHLOROthiazide (MICROZIDE) 12.5 mg, oral, Daily    ibuprofen 200  mg tablet 2 tablets, Every 6 hours PRN    ipratropium-albuteroL (Duo-Neb) 0.5-2.5 mg/3 mL nebulizer solution 3 mL, nebulization, Daily PRN    isosorbide mononitrate ER (Imdur) 30 mg 24 hr tablet TAKE ONE TABLET (30MG DOSE) BY MOUTH DAILY    magnesium oxide (Mag-Ox) 400 mg (241.3 mg magnesium) tablet 1 tablet, Daily    meloxicam (MOBIC) 7.5 mg, oral, Daily PRN    metoprolol tartrate (Lopressor) 25 mg tablet 1 tablet, Daily    mirtazapine (REMERON) 15 mg, oral, Nightly    montelukast (SINGULAIR) 10 mg, oral, Nightly    multivit-min/ferrous fumarate (MULTI VITAMIN ORAL) 1 tablet, Daily    naloxone (Narcan) 4 mg/0.1 mL nasal spray Instill 1 spray intranasally for opioid overdose; repeat in 5 minutes if no response.    nitroglycerin (Nitrostat) 0.4 mg SL tablet 1 tablet, Every 5 min PRN    omeprazole (PRILOSEC) 20 mg, oral, Daily before breakfast, Do not crush or chew.    oxygen (O2) 4 L/min, inhalation, Every 24 hours, PRN    potassium chloride CR 10 mEq ER tablet 1 tablet, Daily       TODAY'S VISIT  DX:     1. Chronic obstructive pulmonary disease with acute exacerbation (Multi)  ipratropium-albuteroL (Duo-Neb) 0.5-2.5 mg/3 mL nebulizer solution      2. Acute nasopharyngitis  azithromycin (Zithromax) 500 mg tablet      3. Spasm of back muscles  meloxicam (Mobic) 7.5 mg tablet           MEDICAL DECISION MAKING:  - Treatment and therapy plan are as above   - Active medical co morbidities have been considered.   - Recent lab work and relevant imaging studies were reviewed.    - Correspondence/notes from specialty consultants were checked.    - Next Follow up in January 2025.      Review of Systems   Constitutional:  Negative for activity change and fever.   HENT:  Negative for hearing loss, nosebleeds and tinnitus.    Eyes:  Negative for redness.   Respiratory:  Positive for cough. Negative for chest tightness and stridor.    Cardiovascular:  Negative for chest pain, palpitations and leg swelling.   Gastrointestinal:   "Negative for blood in stool.   Endocrine: Negative for cold intolerance.   Genitourinary:  Negative for hematuria.   Musculoskeletal:  Positive for back pain. Negative for joint swelling.   Skin:  Negative for rash.   Neurological:  Negative for speech difficulty and light-headedness.   Psychiatric/Behavioral:  Negative for behavioral problems.      Visit Vitals  /74 (BP Location: Left arm, Patient Position: Sitting, BP Cuff Size: Adult)   Pulse 64   Temp 36.8 °C (98.2 °F) (Temporal)   Ht 1.626 m (5' 4\")   Wt 77.6 kg (171 lb)   SpO2 94%   BMI 29.35 kg/m²   OB Status Postmenopausal   Smoking Status Former   BSA 1.87 m²         Wt Readings from Last 10 Encounters:   11/20/24 77.6 kg (171 lb)   10/02/24 61.7 kg (136 lb)   04/03/24 82.1 kg (181 lb)   03/29/24 83.9 kg (185 lb)   03/22/24 81.6 kg (179 lb 14.3 oz)   03/13/24 81.6 kg (180 lb)   03/12/24 82 kg (180 lb 12.8 oz)   03/01/24 79.8 kg (176 lb)   02/23/24 82.3 kg (181 lb 7 oz)   02/12/24 79.6 kg (175 lb 6.4 oz)     Physical Exam  Constitutional:       General: She is not in acute distress.     Appearance: Normal appearance.   HENT:      Head: Normocephalic.      Right Ear: Tympanic membrane normal.      Left Ear: Tympanic membrane normal.      Mouth/Throat:      Mouth: Mucous membranes are moist.   Cardiovascular:      Rate and Rhythm: Normal rate and regular rhythm.      Heart sounds: No murmur heard.  Pulmonary:      Effort: No respiratory distress.   Abdominal:      Palpations: Abdomen is soft.   Musculoskeletal:      Cervical back: Neck supple.      Right lower leg: No edema.      Left lower leg: No edema.   Skin:     Findings: No rash.   Neurological:      General: No focal deficit present.      Mental Status: She is alert and oriented to person, place, and time.   Psychiatric:         Mood and Affect: Mood normal.        RECENT LABS:  Lab Results   Component Value Date    WBC 7.9 10/02/2024    HGB 13.1 10/02/2024    HCT 39.4 10/02/2024     " 10/02/2024    CHOL 143 11/04/2022    TRIG 97 11/04/2022    HDL 56.0 11/04/2022    ALT 18 10/02/2024    AST 21 10/02/2024     (L) 10/02/2024    K 4.0 10/02/2024    CL 98 10/02/2024    CREATININE 0.72 10/02/2024    BUN 13 10/02/2024    CO2 28 10/02/2024    TSH 2.63 11/04/2022    INR 1.0 03/08/2024    HGBA1C 6.4 (H) 03/08/2024     Lab Results   Component Value Date    GLUCOSE 107 (H) 10/02/2024    CALCIUM 9.5 10/02/2024     (L) 10/02/2024    K 4.0 10/02/2024    CO2 28 10/02/2024    CL 98 10/02/2024    BUN 13 10/02/2024    CREATININE 0.72 10/02/2024          Lab Results   Component Value Date    HGBA1C 6.4 (H) 03/08/2024    HGBA1C 6.5 (H) 02/03/2024    HGBA1C 6.4 (H) 02/23/2022     Lab Results   Component Value Date    CREATININE 0.72 10/02/2024             P.S: This note was completed using Dragon voice recognition technology and may include unintended errors with respect to translation of words, typographical errors or grammar errors which may not have been identified while finalizing the chart.

## 2025-01-09 ENCOUNTER — APPOINTMENT (OUTPATIENT)
Dept: PRIMARY CARE | Facility: CLINIC | Age: 80
End: 2025-01-09
Payer: MEDICARE

## 2025-01-09 VITALS
BODY MASS INDEX: 27.25 KG/M2 | DIASTOLIC BLOOD PRESSURE: 84 MMHG | OXYGEN SATURATION: 95 % | WEIGHT: 159.6 LBS | HEIGHT: 64 IN | HEART RATE: 75 BPM | TEMPERATURE: 97 F | SYSTOLIC BLOOD PRESSURE: 142 MMHG

## 2025-01-09 DIAGNOSIS — E78.2 MIXED HYPERLIPIDEMIA: ICD-10-CM

## 2025-01-09 DIAGNOSIS — Z13.89 SCREENING FOR BLOOD OR PROTEIN IN URINE: ICD-10-CM

## 2025-01-09 DIAGNOSIS — Z13.29 SCREENING FOR THYROID DISORDER: ICD-10-CM

## 2025-01-09 DIAGNOSIS — Z00.00 ENCOUNTER FOR SUBSEQUENT ANNUAL WELLNESS VISIT (AWV) IN MEDICARE PATIENT: Primary | ICD-10-CM

## 2025-01-09 DIAGNOSIS — M81.0 AGE-RELATED OSTEOPOROSIS WITHOUT CURRENT PATHOLOGICAL FRACTURE: ICD-10-CM

## 2025-01-09 DIAGNOSIS — G30.9 ALZHEIMER'S DEMENTIA WITHOUT BEHAVIORAL DISTURBANCE, PSYCHOTIC DISTURBANCE, MOOD DISTURBANCE, OR ANXIETY, UNSPECIFIED DEMENTIA SEVERITY, UNSPECIFIED TIMING OF DEMENTIA ONSET (MULTI): ICD-10-CM

## 2025-01-09 DIAGNOSIS — F02.80 ALZHEIMER'S DEMENTIA WITHOUT BEHAVIORAL DISTURBANCE, PSYCHOTIC DISTURBANCE, MOOD DISTURBANCE, OR ANXIETY, UNSPECIFIED DEMENTIA SEVERITY, UNSPECIFIED TIMING OF DEMENTIA ONSET (MULTI): ICD-10-CM

## 2025-01-09 DIAGNOSIS — I50.32 CHRONIC DIASTOLIC HEART FAILURE: ICD-10-CM

## 2025-01-09 DIAGNOSIS — J44.1 COPD EXACERBATION (MULTI): ICD-10-CM

## 2025-01-09 DIAGNOSIS — Z23 NEED FOR IMMUNIZATION AGAINST INFLUENZA: ICD-10-CM

## 2025-01-09 PROBLEM — D81.9 COMBINED IMMUNODEFICIENCY, UNSPECIFIED: Status: ACTIVE | Noted: 2025-01-09

## 2025-01-09 PROCEDURE — 99214 OFFICE O/P EST MOD 30 MIN: CPT | Performed by: INTERNAL MEDICINE

## 2025-01-09 PROCEDURE — 1170F FXNL STATUS ASSESSED: CPT | Performed by: INTERNAL MEDICINE

## 2025-01-09 PROCEDURE — G0444 DEPRESSION SCREEN ANNUAL: HCPCS | Performed by: INTERNAL MEDICINE

## 2025-01-09 PROCEDURE — 1157F ADVNC CARE PLAN IN RCRD: CPT | Performed by: INTERNAL MEDICINE

## 2025-01-09 PROCEDURE — 1159F MED LIST DOCD IN RCRD: CPT | Performed by: INTERNAL MEDICINE

## 2025-01-09 PROCEDURE — 1160F RVW MEDS BY RX/DR IN RCRD: CPT | Performed by: INTERNAL MEDICINE

## 2025-01-09 PROCEDURE — 3079F DIAST BP 80-89 MM HG: CPT | Performed by: INTERNAL MEDICINE

## 2025-01-09 PROCEDURE — 90662 IIV NO PRSV INCREASED AG IM: CPT | Performed by: INTERNAL MEDICINE

## 2025-01-09 PROCEDURE — 1158F ADVNC CARE PLAN TLK DOCD: CPT | Performed by: INTERNAL MEDICINE

## 2025-01-09 PROCEDURE — G0008 ADMIN INFLUENZA VIRUS VAC: HCPCS | Performed by: INTERNAL MEDICINE

## 2025-01-09 PROCEDURE — 1123F ACP DISCUSS/DSCN MKR DOCD: CPT | Performed by: INTERNAL MEDICINE

## 2025-01-09 PROCEDURE — G0439 PPPS, SUBSEQ VISIT: HCPCS | Performed by: INTERNAL MEDICINE

## 2025-01-09 PROCEDURE — 3077F SYST BP >= 140 MM HG: CPT | Performed by: INTERNAL MEDICINE

## 2025-01-09 ASSESSMENT — ACTIVITIES OF DAILY LIVING (ADL)
DOING_HOUSEWORK: NEEDS ASSISTANCE
GROCERY_SHOPPING: NEEDS ASSISTANCE
MANAGING_FINANCES: INDEPENDENT
DRESSING: INDEPENDENT
TAKING_MEDICATION: INDEPENDENT
BATHING: INDEPENDENT

## 2025-01-09 ASSESSMENT — ENCOUNTER SYMPTOMS
OCCASIONAL FEELINGS OF UNSTEADINESS: 1
DEPRESSION: 1
LOSS OF SENSATION IN FEET: 0

## 2025-01-09 ASSESSMENT — PATIENT HEALTH QUESTIONNAIRE - PHQ9
10. IF YOU CHECKED OFF ANY PROBLEMS, HOW DIFFICULT HAVE THESE PROBLEMS MADE IT FOR YOU TO DO YOUR WORK, TAKE CARE OF THINGS AT HOME, OR GET ALONG WITH OTHER PEOPLE: NOT DIFFICULT AT ALL
1. LITTLE INTEREST OR PLEASURE IN DOING THINGS: SEVERAL DAYS
SUM OF ALL RESPONSES TO PHQ9 QUESTIONS 1 AND 2: 1
2. FEELING DOWN, DEPRESSED OR HOPELESS: NOT AT ALL

## 2025-01-10 ENCOUNTER — APPOINTMENT (OUTPATIENT)
Dept: ORTHOPEDIC SURGERY | Facility: CLINIC | Age: 80
End: 2025-01-10
Payer: MEDICARE

## 2025-01-23 ENCOUNTER — LAB (OUTPATIENT)
Dept: LAB | Facility: LAB | Age: 80
End: 2025-01-23
Payer: MEDICARE

## 2025-01-23 ENCOUNTER — OFFICE VISIT (OUTPATIENT)
Dept: PRIMARY CARE | Facility: CLINIC | Age: 80
End: 2025-01-23
Payer: MEDICARE

## 2025-01-23 ENCOUNTER — HOSPITAL ENCOUNTER (OUTPATIENT)
Dept: RADIOLOGY | Facility: HOSPITAL | Age: 80
Discharge: HOME | End: 2025-01-23
Payer: MEDICARE

## 2025-01-23 ENCOUNTER — TELEPHONE (OUTPATIENT)
Dept: PRIMARY CARE | Facility: CLINIC | Age: 80
End: 2025-01-23

## 2025-01-23 VITALS
TEMPERATURE: 97.6 F | OXYGEN SATURATION: 95 % | HEART RATE: 91 BPM | SYSTOLIC BLOOD PRESSURE: 122 MMHG | BODY MASS INDEX: 27.36 KG/M2 | DIASTOLIC BLOOD PRESSURE: 80 MMHG | WEIGHT: 159.4 LBS

## 2025-01-23 DIAGNOSIS — J44.9 CHRONIC OBSTRUCTIVE PULMONARY DISEASE, UNSPECIFIED COPD TYPE (MULTI): ICD-10-CM

## 2025-01-23 DIAGNOSIS — R41.3 MEMORY IMPAIRMENT: ICD-10-CM

## 2025-01-23 DIAGNOSIS — R41.82 ACUTE ALTERATION IN MENTAL STATUS: ICD-10-CM

## 2025-01-23 DIAGNOSIS — N32.81 OAB (OVERACTIVE BLADDER): ICD-10-CM

## 2025-01-23 DIAGNOSIS — I25.10 CAD S/P PERCUTANEOUS CORONARY ANGIOPLASTY: ICD-10-CM

## 2025-01-23 DIAGNOSIS — J44.1 COPD EXACERBATION (MULTI): ICD-10-CM

## 2025-01-23 DIAGNOSIS — E55.9 INADEQUATE INTAKE OF CALCIUM AND VITAMIN D: ICD-10-CM

## 2025-01-23 DIAGNOSIS — E58 INADEQUATE INTAKE OF CALCIUM AND VITAMIN D: ICD-10-CM

## 2025-01-23 DIAGNOSIS — I48.0 PAROXYSMAL ATRIAL FIBRILLATION (MULTI): ICD-10-CM

## 2025-01-23 DIAGNOSIS — Z13.29 SCREENING FOR THYROID DISORDER: ICD-10-CM

## 2025-01-23 DIAGNOSIS — I50.32 CHRONIC DIASTOLIC HEART FAILURE: ICD-10-CM

## 2025-01-23 DIAGNOSIS — Z13.220 SCREENING FOR LIPID DISORDERS: ICD-10-CM

## 2025-01-23 DIAGNOSIS — F02.80 ALZHEIMER'S DEMENTIA WITHOUT BEHAVIORAL DISTURBANCE, PSYCHOTIC DISTURBANCE, MOOD DISTURBANCE, OR ANXIETY, UNSPECIFIED DEMENTIA SEVERITY, UNSPECIFIED TIMING OF DEMENTIA ONSET (MULTI): Primary | ICD-10-CM

## 2025-01-23 DIAGNOSIS — Z98.61 CAD S/P PERCUTANEOUS CORONARY ANGIOPLASTY: ICD-10-CM

## 2025-01-23 DIAGNOSIS — I10 BENIGN ESSENTIAL HYPERTENSION: ICD-10-CM

## 2025-01-23 DIAGNOSIS — G30.9 ALZHEIMER'S DEMENTIA WITHOUT BEHAVIORAL DISTURBANCE, PSYCHOTIC DISTURBANCE, MOOD DISTURBANCE, OR ANXIETY, UNSPECIFIED DEMENTIA SEVERITY, UNSPECIFIED TIMING OF DEMENTIA ONSET (MULTI): Primary | ICD-10-CM

## 2025-01-23 LAB
25(OH)D3 SERPL-MCNC: 58 NG/ML (ref 30–100)
ALBUMIN SERPL BCP-MCNC: 4.6 G/DL (ref 3.4–5)
ALP SERPL-CCNC: 85 U/L (ref 33–136)
ALT SERPL W P-5'-P-CCNC: 10 U/L (ref 7–45)
ANION GAP SERPL CALC-SCNC: 12 MMOL/L (ref 10–20)
APPEARANCE UR: CLEAR
AST SERPL W P-5'-P-CCNC: 17 U/L (ref 9–39)
BACTERIA #/AREA URNS AUTO: ABNORMAL /HPF
BASOPHILS # BLD AUTO: 0.04 X10*3/UL (ref 0–0.1)
BASOPHILS NFR BLD AUTO: 0.6 %
BILIRUB SERPL-MCNC: 0.3 MG/DL (ref 0–1.2)
BILIRUB UR STRIP.AUTO-MCNC: NEGATIVE MG/DL
BUN SERPL-MCNC: 11 MG/DL (ref 6–23)
CALCIUM SERPL-MCNC: 9.6 MG/DL (ref 8.6–10.3)
CHLORIDE SERPL-SCNC: 102 MMOL/L (ref 98–107)
CHOLEST SERPL-MCNC: 219 MG/DL (ref 0–199)
CHOLESTEROL/HDL RATIO: 3.4
CO2 SERPL-SCNC: 31 MMOL/L (ref 21–32)
COLOR UR: YELLOW
CREAT SERPL-MCNC: 0.48 MG/DL (ref 0.5–1.05)
EGFRCR SERPLBLD CKD-EPI 2021: >90 ML/MIN/1.73M*2
EOSINOPHIL # BLD AUTO: 0.05 X10*3/UL (ref 0–0.4)
EOSINOPHIL NFR BLD AUTO: 0.7 %
ERYTHROCYTE [DISTWIDTH] IN BLOOD BY AUTOMATED COUNT: 14.7 % (ref 11.5–14.5)
GLUCOSE SERPL-MCNC: 99 MG/DL (ref 74–99)
GLUCOSE UR STRIP.AUTO-MCNC: NORMAL MG/DL
HCT VFR BLD AUTO: 42.5 % (ref 36–46)
HDLC SERPL-MCNC: 63.6 MG/DL
HGB BLD-MCNC: 13.9 G/DL (ref 12–16)
IMM GRANULOCYTES # BLD AUTO: 0.01 X10*3/UL (ref 0–0.5)
IMM GRANULOCYTES NFR BLD AUTO: 0.1 % (ref 0–0.9)
KETONES UR STRIP.AUTO-MCNC: NEGATIVE MG/DL
LDLC SERPL CALC-MCNC: 126 MG/DL
LEUKOCYTE ESTERASE UR QL STRIP.AUTO: NEGATIVE
LYMPHOCYTES # BLD AUTO: 2 X10*3/UL (ref 0.8–3)
LYMPHOCYTES NFR BLD AUTO: 28.6 %
MCH RBC QN AUTO: 30 PG (ref 26–34)
MCHC RBC AUTO-ENTMCNC: 32.7 G/DL (ref 32–36)
MCV RBC AUTO: 92 FL (ref 80–100)
MONOCYTES # BLD AUTO: 0.55 X10*3/UL (ref 0.05–0.8)
MONOCYTES NFR BLD AUTO: 7.9 %
MUCOUS THREADS #/AREA URNS AUTO: ABNORMAL /LPF
NEUTROPHILS # BLD AUTO: 4.34 X10*3/UL (ref 1.6–5.5)
NEUTROPHILS NFR BLD AUTO: 62.1 %
NITRITE UR QL STRIP.AUTO: NEGATIVE
NON HDL CHOLESTEROL: 155 MG/DL (ref 0–149)
NRBC BLD-RTO: 0 /100 WBCS (ref 0–0)
PH UR STRIP.AUTO: 6.5 [PH]
PLATELET # BLD AUTO: 267 X10*3/UL (ref 150–450)
POTASSIUM SERPL-SCNC: 4.1 MMOL/L (ref 3.5–5.3)
PROT SERPL-MCNC: 7.1 G/DL (ref 6.4–8.2)
PROT UR STRIP.AUTO-MCNC: NORMAL MG/DL
RBC # BLD AUTO: 4.63 X10*6/UL (ref 4–5.2)
RBC # UR STRIP.AUTO: NEGATIVE /UL
RBC #/AREA URNS AUTO: ABNORMAL /HPF
SODIUM SERPL-SCNC: 141 MMOL/L (ref 136–145)
SP GR UR STRIP.AUTO: 1.02
SQUAMOUS #/AREA URNS AUTO: ABNORMAL /HPF
TRIGL SERPL-MCNC: 148 MG/DL (ref 0–149)
TSH SERPL-ACNC: 1.67 MIU/L (ref 0.44–3.98)
UROBILINOGEN UR STRIP.AUTO-MCNC: NORMAL MG/DL
VLDL: 30 MG/DL (ref 0–40)
WBC # BLD AUTO: 7 X10*3/UL (ref 4.4–11.3)
WBC #/AREA URNS AUTO: ABNORMAL /HPF

## 2025-01-23 PROCEDURE — 80061 LIPID PANEL: CPT

## 2025-01-23 PROCEDURE — 70450 CT HEAD/BRAIN W/O DYE: CPT | Performed by: RADIOLOGY

## 2025-01-23 PROCEDURE — 80053 COMPREHEN METABOLIC PANEL: CPT

## 2025-01-23 PROCEDURE — 1158F ADVNC CARE PLAN TLK DOCD: CPT | Performed by: FAMILY MEDICINE

## 2025-01-23 PROCEDURE — 3079F DIAST BP 80-89 MM HG: CPT | Performed by: FAMILY MEDICINE

## 2025-01-23 PROCEDURE — 1123F ACP DISCUSS/DSCN MKR DOCD: CPT | Performed by: FAMILY MEDICINE

## 2025-01-23 PROCEDURE — 1036F TOBACCO NON-USER: CPT | Performed by: FAMILY MEDICINE

## 2025-01-23 PROCEDURE — 3074F SYST BP LT 130 MM HG: CPT | Performed by: FAMILY MEDICINE

## 2025-01-23 PROCEDURE — 87086 URINE CULTURE/COLONY COUNT: CPT

## 2025-01-23 PROCEDURE — 70450 CT HEAD/BRAIN W/O DYE: CPT

## 2025-01-23 PROCEDURE — 99214 OFFICE O/P EST MOD 30 MIN: CPT | Performed by: FAMILY MEDICINE

## 2025-01-23 PROCEDURE — 81001 URINALYSIS AUTO W/SCOPE: CPT

## 2025-01-23 PROCEDURE — 1157F ADVNC CARE PLAN IN RCRD: CPT | Performed by: FAMILY MEDICINE

## 2025-01-23 PROCEDURE — 84443 ASSAY THYROID STIM HORMONE: CPT

## 2025-01-23 PROCEDURE — 82306 VITAMIN D 25 HYDROXY: CPT

## 2025-01-23 PROCEDURE — 1159F MED LIST DOCD IN RCRD: CPT | Performed by: FAMILY MEDICINE

## 2025-01-23 PROCEDURE — 84481 FREE ASSAY (FT-3): CPT

## 2025-01-23 PROCEDURE — 85025 COMPLETE CBC W/AUTO DIFF WBC: CPT

## 2025-01-23 ASSESSMENT — PATIENT HEALTH QUESTIONNAIRE - PHQ9
1. LITTLE INTEREST OR PLEASURE IN DOING THINGS: NOT AT ALL
2. FEELING DOWN, DEPRESSED OR HOPELESS: NOT AT ALL
SUM OF ALL RESPONSES TO PHQ9 QUESTIONS 1 AND 2: 0

## 2025-01-23 NOTE — PROGRESS NOTES
Subjective   Chief complaint: Rae Lundy is a 80 y.o. female who presents for UTI (Daughter in law thinks patient might have a UTI, they are wanting to rule it our. Daughter in law states that patient has been a bit more confused. Patient states that at the moment they are unable to leave a urine sample).    HPI:  HPI  Patient presents today companied by her daughter-in-law.  They are concerned about acute mental status changes.  2 or 3 days.  Just seems a little disoriented confused.  She has had urinary tract infection in the past.  She has had urinary leakage problems she uses depends.  No fevers chills.  No nausea vomiting.  History of COPD baseline cough no cough sputum chest tight wheeze short of breath above baseline.  No change in her bowel habits.  No recent changes in her medications.  She was on I medicine for overactive bladder in the past but has not been recent.  Cyclobenzaprine is on her list but she does not take it.  Benadryl on her list but she does not take it.  No significant changes in her medications as of recent.  On her medication list and her active problem list that mentions dementia.  She states she was seen by a neurologist in the past but they decided she did not have dementia.  Examination today is unremarkable from a physical examination standpoint.  I dipped her urine looks clear.  Regular do a formal urinalysis.  Urine culture.  Stat CT brain rule out acute.  She has a comprehensive laboratory assessment ordered by her primary care physician when she is lyndsay go and have done now.  We can have short-term follow-up with her primary care physician for further treatment recommendation  Objective   /80 (BP Location: Left arm, Patient Position: Sitting, BP Cuff Size: Adult)   Pulse 91   Temp 36.4 °C (97.6 °F) (Temporal)   Wt 72.3 kg (159 lb 6.4 oz)   SpO2 95% Comment: RA  BMI 27.36 kg/m²   Physical Exam  Review of Systems   I have reviewed and reconciled the medication list  with the patient today.   Current Outpatient Medications:     acetaminophen (Tylenol 8 HOUR) 650 mg ER tablet, Take 2 tablets (1,300 mg) by mouth once daily., Disp: , Rfl:     aspirin 81 mg EC tablet, Take 1 tablet (81 mg) by mouth once daily., Disp: , Rfl:     benzocaine-menthol lozenge, Dissolve 1 lozenge in the mouth every 4 hours if needed for sore throat., Disp: , Rfl:     bisacodyl (Dulcolax) 5 mg EC tablet, Take 2 tablets (10 mg) by mouth once daily as needed., Disp: , Rfl:     cyclobenzaprine (Flexeril) 5 mg tablet, Take 1 tablet (5 mg) by mouth 3 times a day as needed., Disp: , Rfl:     diphenhydrAMINE 12.5 mg/5 mL liquid, Take 5 mL (12.5 mg) by mouth every 6 hours if needed for sleep., Disp: , Rfl:     ergocalciferol (Vitamin D-2) 1.25 MG (94526 UT) capsule, Take 1 capsule (1,250 mcg) by mouth 1 (one) time per week. Monday, Disp: , Rfl:     ferrous sulfate, 325 mg ferrous sulfate, tablet, Take 1 tablet (325 mg) by mouth once daily with breakfast., Disp: , Rfl:     hydroCHLOROthiazide (Microzide) 12.5 mg capsule, Take 1 capsule (12.5 mg) by mouth once daily., Disp: 90 capsule, Rfl: 0    ibuprofen 200 mg tablet, Take 2 tablets (400 mg) by mouth every 6 hours if needed for mild pain (1 - 3)., Disp: , Rfl:     ipratropium-albuteroL (Duo-Neb) 0.5-2.5 mg/3 mL nebulizer solution, Take 3 mL by nebulization once daily as needed for wheezing., Disp: 180 mL, Rfl: 3    isosorbide mononitrate ER (Imdur) 30 mg 24 hr tablet, TAKE ONE TABLET (30MG DOSE) BY MOUTH DAILY, Disp: 90 tablet, Rfl: 3    magnesium oxide (Mag-Ox) 400 mg (241.3 mg magnesium) tablet, Take 1 tablet (400 mg) by mouth once daily., Disp: , Rfl:     meloxicam (Mobic) 7.5 mg tablet, Take 1 tablet (7.5 mg) by mouth once daily as needed for moderate pain (4 - 6)., Disp: 30 tablet, Rfl: 1    metoprolol tartrate (Lopressor) 25 mg tablet, Take 1 tablet (25 mg) by mouth once daily., Disp: , Rfl:     multivit-min/ferrous fumarate (MULTI VITAMIN ORAL), Take 1  tablet by mouth once daily., Disp: , Rfl:     naloxone (Narcan) 4 mg/0.1 mL nasal spray, Instill 1 spray intranasally for opioid overdose; repeat in 5 minutes if no response., Disp: 2 each, Rfl: 0    omeprazole (PriLOSEC) 20 mg DR capsule, Take 1 capsule (20 mg) by mouth once daily in the morning. Take before meals. Do not crush or chew., Disp: 90 capsule, Rfl: 1    oxygen (O2) gas therapy, Inhale 4 L/min once every 24 hours. PRN, Disp: , Rfl:     potassium chloride CR 10 mEq ER tablet, Take 1 tablet (10 mEq) by mouth once daily., Disp: , Rfl:     mirtazapine (Remeron) 15 mg tablet, Take 1 tablet (15 mg) by mouth once daily at bedtime., Disp: 30 tablet, Rfl: 0    montelukast (Singulair) 10 mg tablet, Take 1 tablet (10 mg) by mouth once daily at bedtime., Disp: 30 tablet, Rfl: 5    nitroglycerin (Nitrostat) 0.4 mg SL tablet, Place 1 tablet (0.4 mg) under the tongue every 5 minutes if needed for chest pain (Up to 3 doses as needed. call 911 if pain persists.)., Disp: , Rfl:      Imaging:  No results found.     Labs reviewed:    Lab Results   Component Value Date    WBC 7.9 10/02/2024    HGB 13.1 10/02/2024    HCT 39.4 10/02/2024     10/02/2024    CHOL 143 11/04/2022    TRIG 97 11/04/2022    HDL 56.0 11/04/2022    ALT 18 10/02/2024    AST 21 10/02/2024     (L) 10/02/2024    K 4.0 10/02/2024    CL 98 10/02/2024    CREATININE 0.72 10/02/2024    BUN 13 10/02/2024    CO2 28 10/02/2024    TSH 2.63 11/04/2022    INR 1.0 03/08/2024    HGBA1C 6.4 (H) 03/08/2024       Assessment/Plan   Problem List Items Addressed This Visit             ICD-10-CM    Benign essential hypertension I10    CAD S/P percutaneous coronary angioplasty I25.10, Z98.61    COPD (chronic obstructive pulmonary disease) (Multi) J44.9    Dementia - Primary F03.90    OAB (overactive bladder) N32.81    Paroxysmal atrial fibrillation (Multi) I48.0     Other Visit Diagnoses         Codes    Memory impairment     R41.3    Acute alteration in mental  status     R41.82    Relevant Orders    Urine Culture    Urinalysis with Reflex Microscopic    CT head wo IV contrast            Reinforced lifestyle modifications  Continue current medications as listed  Physical activity as tolerated and healthy diet encouraged  Maintain a healthy weight  Follow up in

## 2025-01-23 NOTE — TELEPHONE ENCOUNTER
"Patient daughter in law called office last night requesting an UA and culture so they can uti due to patient \"acting odd\". Leona is not on a friends and family. A call was placed to the Jessy advising that we can not take any request since she is not on the friends and family and advised that an appointment would be required. Jessy verbalized understanding and asked if there was any openings today to be seen. She was advised that the  will be able to assist with scheduling. She was also advised that a new friends and family would need to be filled out to update contact information.   "

## 2025-01-24 LAB — T3FREE SERPL-MCNC: 3.5 PG/ML (ref 2.3–4.2)

## 2025-01-25 LAB — BACTERIA UR CULT: NORMAL

## 2025-01-30 ENCOUNTER — APPOINTMENT (OUTPATIENT)
Dept: PRIMARY CARE | Facility: CLINIC | Age: 80
End: 2025-01-30
Payer: MEDICARE

## 2025-01-30 ENCOUNTER — OFFICE VISIT (OUTPATIENT)
Dept: PRIMARY CARE | Facility: CLINIC | Age: 80
End: 2025-01-30
Payer: MEDICARE

## 2025-01-30 VITALS
DIASTOLIC BLOOD PRESSURE: 70 MMHG | SYSTOLIC BLOOD PRESSURE: 110 MMHG | OXYGEN SATURATION: 96 % | TEMPERATURE: 98.4 F | HEIGHT: 64 IN | WEIGHT: 158 LBS | BODY MASS INDEX: 26.98 KG/M2 | HEART RATE: 71 BPM

## 2025-01-30 DIAGNOSIS — F41.9 ANXIETY AND DEPRESSION: Primary | ICD-10-CM

## 2025-01-30 DIAGNOSIS — F32.A ANXIETY AND DEPRESSION: Primary | ICD-10-CM

## 2025-01-30 PROCEDURE — 1160F RVW MEDS BY RX/DR IN RCRD: CPT | Performed by: INTERNAL MEDICINE

## 2025-01-30 PROCEDURE — 3078F DIAST BP <80 MM HG: CPT | Performed by: INTERNAL MEDICINE

## 2025-01-30 PROCEDURE — 1159F MED LIST DOCD IN RCRD: CPT | Performed by: INTERNAL MEDICINE

## 2025-01-30 PROCEDURE — 1157F ADVNC CARE PLAN IN RCRD: CPT | Performed by: INTERNAL MEDICINE

## 2025-01-30 PROCEDURE — 1123F ACP DISCUSS/DSCN MKR DOCD: CPT | Performed by: INTERNAL MEDICINE

## 2025-01-30 PROCEDURE — 3074F SYST BP LT 130 MM HG: CPT | Performed by: INTERNAL MEDICINE

## 2025-01-30 PROCEDURE — G2211 COMPLEX E/M VISIT ADD ON: HCPCS | Performed by: INTERNAL MEDICINE

## 2025-01-30 PROCEDURE — 99214 OFFICE O/P EST MOD 30 MIN: CPT | Performed by: INTERNAL MEDICINE

## 2025-01-30 PROCEDURE — 1158F ADVNC CARE PLAN TLK DOCD: CPT | Performed by: INTERNAL MEDICINE

## 2025-01-30 RX ORDER — SERTRALINE HYDROCHLORIDE 50 MG/1
50 TABLET, FILM COATED ORAL DAILY
Qty: 90 TABLET | Refills: 1 | Status: SHIPPED | OUTPATIENT
Start: 2025-01-30 | End: 2026-01-30

## 2025-01-30 ASSESSMENT — PATIENT HEALTH QUESTIONNAIRE - PHQ9
SUM OF ALL RESPONSES TO PHQ9 QUESTIONS 1 AND 2: 3
5. POOR APPETITE OR OVEREATING: NOT AT ALL
4. FEELING TIRED OR HAVING LITTLE ENERGY: MORE THAN HALF THE DAYS
6. FEELING BAD ABOUT YOURSELF - OR THAT YOU ARE A FAILURE OR HAVE LET YOURSELF OR YOUR FAMILY DOWN: MORE THAN HALF THE DAYS
7. TROUBLE CONCENTRATING ON THINGS, SUCH AS READING THE NEWSPAPER OR WATCHING TELEVISION: NOT AT ALL
2. FEELING DOWN, DEPRESSED OR HOPELESS: MORE THAN HALF THE DAYS
3. TROUBLE FALLING OR STAYING ASLEEP OR SLEEPING TOO MUCH: NOT AT ALL
10. IF YOU CHECKED OFF ANY PROBLEMS, HOW DIFFICULT HAVE THESE PROBLEMS MADE IT FOR YOU TO DO YOUR WORK, TAKE CARE OF THINGS AT HOME, OR GET ALONG WITH OTHER PEOPLE: NOT DIFFICULT AT ALL
8. MOVING OR SPEAKING SO SLOWLY THAT OTHER PEOPLE COULD HAVE NOTICED. OR THE OPPOSITE, BEING SO FIGETY OR RESTLESS THAT YOU HAVE BEEN MOVING AROUND A LOT MORE THAN USUAL: NOT AT ALL
SUM OF ALL RESPONSES TO PHQ QUESTIONS 1-9: 7
9. THOUGHTS THAT YOU WOULD BE BETTER OFF DEAD, OR OF HURTING YOURSELF: NOT AT ALL
1. LITTLE INTEREST OR PLEASURE IN DOING THINGS: SEVERAL DAYS

## 2025-02-03 ENCOUNTER — TELEPHONE (OUTPATIENT)
Dept: PRIMARY CARE | Facility: CLINIC | Age: 80
End: 2025-02-03
Payer: MEDICARE

## 2025-02-03 DIAGNOSIS — N10 ACUTE PYELONEPHRITIS: Primary | ICD-10-CM

## 2025-02-03 DIAGNOSIS — N30.00 ACUTE CYSTITIS WITHOUT HEMATURIA: ICD-10-CM

## 2025-02-04 DIAGNOSIS — N32.81 OAB (OVERACTIVE BLADDER): Primary | ICD-10-CM

## 2025-02-04 DIAGNOSIS — N10 ACUTE PYELONEPHRITIS: ICD-10-CM

## 2025-02-11 DIAGNOSIS — F02.80 ALZHEIMER'S DEMENTIA WITHOUT BEHAVIORAL DISTURBANCE, PSYCHOTIC DISTURBANCE, MOOD DISTURBANCE, OR ANXIETY, UNSPECIFIED DEMENTIA SEVERITY, UNSPECIFIED TIMING OF DEMENTIA ONSET (MULTI): ICD-10-CM

## 2025-02-11 DIAGNOSIS — I11.0 HYPERTENSIVE HEART DISEASE WITH HEART FAILURE: ICD-10-CM

## 2025-02-11 DIAGNOSIS — K22.70 BARRETT'S ESOPHAGUS WITHOUT DYSPLASIA: ICD-10-CM

## 2025-02-11 DIAGNOSIS — G30.9 ALZHEIMER'S DEMENTIA WITHOUT BEHAVIORAL DISTURBANCE, PSYCHOTIC DISTURBANCE, MOOD DISTURBANCE, OR ANXIETY, UNSPECIFIED DEMENTIA SEVERITY, UNSPECIFIED TIMING OF DEMENTIA ONSET (MULTI): ICD-10-CM

## 2025-02-11 DIAGNOSIS — J96.01 ACUTE RESPIRATORY FAILURE WITH HYPOXIA (MULTI): ICD-10-CM

## 2025-02-11 DIAGNOSIS — F43.21 GRIEF: ICD-10-CM

## 2025-02-11 RX ORDER — HYDROCHLOROTHIAZIDE 12.5 MG/1
12.5 CAPSULE ORAL DAILY
Qty: 90 CAPSULE | Refills: 3 | Status: SHIPPED | OUTPATIENT
Start: 2025-02-11

## 2025-02-11 RX ORDER — OMEPRAZOLE 20 MG/1
20 CAPSULE, DELAYED RELEASE ORAL
Qty: 90 CAPSULE | Refills: 3 | Status: SHIPPED | OUTPATIENT
Start: 2025-02-11 | End: 2026-02-11

## 2025-02-11 RX ORDER — MIRTAZAPINE 15 MG/1
15 TABLET, FILM COATED ORAL NIGHTLY
Qty: 90 TABLET | Refills: 3 | Status: SHIPPED | OUTPATIENT
Start: 2025-02-11 | End: 2026-02-11

## 2025-02-11 RX ORDER — MONTELUKAST SODIUM 10 MG/1
10 TABLET ORAL NIGHTLY
Qty: 90 TABLET | Refills: 3 | Status: SHIPPED | OUTPATIENT
Start: 2025-02-11 | End: 2026-02-11

## 2025-02-12 DIAGNOSIS — N30.00 ACUTE CYSTITIS WITHOUT HEMATURIA: Primary | ICD-10-CM

## 2025-02-21 DIAGNOSIS — I48.0 PAROXYSMAL ATRIAL FIBRILLATION (MULTI): ICD-10-CM

## 2025-02-21 DIAGNOSIS — E87.6 HYPOKALEMIA: ICD-10-CM

## 2025-02-21 RX ORDER — POTASSIUM CHLORIDE 750 MG/1
10 TABLET, EXTENDED RELEASE ORAL DAILY
Qty: 14 TABLET | Refills: 0 | Status: SHIPPED | OUTPATIENT
Start: 2025-02-21

## 2025-02-21 NOTE — TELEPHONE ENCOUNTER
Received request for prescription refill for patient.  Patient follows with Dr. Jamila Matute MD, FACC, FACP, RS     Request is for potassium   Is patient currently on medication- yes    Last OV- 3/1/24  Next OV- needs an appointment    [facility] [phone] [facility]- please schedule patient for a follow up appointment.     Pharmacy requested a 2 week rx as patient does compliance packets for prescriptions and they are picking them up tomorrow. Pharmacy also stated the patients son is aware that the patient needs an appointment, he just has not had a chance to set one up yet. 2 week rx sent to provider until appointment is made.     Pended for signing and sent to provider.

## 2025-02-28 NOTE — PROGRESS NOTES
Electrophysiology Office Visit       CHIEF COMPLAINT  No chief complaint on file.       Primary EP doctor: Dr Matute  Primary Cardiologist: Amari    EP History: pAF w/ RVR 11/2019, pSVT dx 2021 on loop, not on OAC 2/2 h/o UGI bleed & declined Watch[facility]  6/5/2019 TIA (L sided facial and arm parasthesias)  11/10/19-11/13/19 Admit for AF w/ RVR, NSTEMI, Heart cath demonstrated 100% mid occlusion of RCA which fills via collaterals with normal irregularities of the circumflex and normal left main coronary artery. medical management  11/21/2019 LOOP recorder placed (pSVT and pauses noted throughout loop history especially in 2021)  1/23/2020 LAD YAYA  2/2023 Admit 2/2 COPD exacerbation and SVT, increased BB   Controlled on Metoprolol tartrate 25mg BID, No OAC 2/2 UGI bleed; (EGD 10/29/2019-Villeda's esophagus, hiatal hernia, small duodenal ulcer, gastritis with bile reflux, and duodenal angiodysplasias. )    HPI: 2/28/2025      Today's EKG Interpretation:       8/2/2024 Heart Cath   CONCLUSIONS:   1. Left Main Coronary Artery: This artery is normal.   2. Left Anterior Descending Artery: presents luminal irregularities and contains patent previously placed stents.   3. Circumflex Coronary Artery: presents luminal irregularities.   4. Right Coronary Artery: significantly obstructed and fills via collaterals.   5. Mid RCA Lesion: The percent stenosis is 100%.   6. The Left Ventricular Ejection Fraction is 55%.    11/11/2019 Heart Cath   CONCLUSIONS:   1. #1 right dominant system      The right coronary artery arises from the right sinus of Valsalva. There is diffuse disease extending all living up to the ostium, then 100% mid occlusion which appears chronic, with CECIL 1-2 antegrade flow by collaterals, grade 3 left to right flow by collaterals to the distal RCA. There is extensive calcification throughout the proximal and mid RCA. The distal mid RCA has a 95% stenosis as well. The posterolateral ventricular branch and the PDA  fills suboptimally by antegrade flow, but filled well via retrograde flow and are moderately large in size. Plan for this lesion is medical management.       #2Left main coronary artery arises from the left sinus of Valsalva. It divides into the left anterior descending artery and the left circumflex artery.       #3The left anterior descending artery is a large vessel that comes around the left ventricular apex. There is moderate calcification in the proximal and mid left anterior descending artery.      First major diagonal branch is relatively small, the second major diagonal branch is large, with less than 10% stenosis. At the origin of the second and major diagonal branch there is a sharp bend in the left anterior descending artery, and within the bend there is at least 50% to 70 smooth stenosis which appears focal. Mid left anterior descending artery in the distal segment has less than 10% stenosis, the distal left anterior descending artery has less than 10% stenosis.       Left circumflex artery is nondominant, has moderate proximal calcification, continues as a major obtuse marginal branch, the proximal vessel has about 20% stenosis and distal obtuse marginal branch has less than 10% stenosis. The AV groove left circumflex artery stops at the origin of the first major obtuse marginal branch.   Overall visually estimated left ventricular ejection fraction is about 50%. There is mild apical hypokinesis. There is no appreciable mitral regurgitation. Left ventricular end-diastolic pressure is about 5 mmHg, no systolic gradient across the aortic valve.     1/28/21 Nuclear stress test  CONCLUSIONS:    1. SPECT Perfusion Study: Normal.    2. There is no scintigraphic evidence for inducible ischemia.    3. No evidence of scarred myocardium.    4. Left ventricle is small. The left ventricle systolic function is hyperdynamic.    5. This is a low risk scan.  LVEF % 83     11/11/2019 ECHO  CONCLUSIONS:   1. The left  ventricular systolic function is normal with a 55-60% estimated ejection fraction.   2. Spectral Doppler shows an impaired relaxation pattern of left ventricular diastolic filling.   3. There is mild asymmetric left ventricular hypertrophy.   4. There is moderate thickening and calcification of the posterior mitral valve leaflet.   5. Aortic valve sclerosis.      VITALS  There were no vitals filed for this visit.  Wt Readings from Last 4 Encounters:   01/30/25 71.7 kg (158 lb)   01/23/25 72.3 kg (159 lb 6.4 oz)   01/09/25 72.4 kg (159 lb 9.6 oz)   11/20/24 77.6 kg (171 lb)       PHYSICAL EXAM:  GENERAL:  Well developed, well nourished, in no acute distress.  NEURO/PSYCH:  No focal deficits. A&O x 3   LUNGS:  Clear to auscultation bilaterally. No wheezing, rhonci, or crackles  HEART:  RRR, no M/R/G.   EXTREMITIES:  Warm with good color, no clubbing or cyanosis.  No pitting edema.     Past Medical History:   Diagnosis Date    Acute candidiasis of vulva and vagina 10/22/2018    Yeast infection of the vagina    Anemia     Anxiety     Anxiety disorder, unspecified     Anxiety and depression    Arrhythmia     Arthritis     Atrial fibrillation (Multi)     Body mass index (BMI) 29.0-29.9, adult 03/04/2022    BMI 29.0-29.9,adult    Bunion of left foot     Bunion, left    Candidiasis of skin and nail 10/22/2018    Yeast infection of the skin    COPD (chronic obstructive pulmonary disease) (Multi)     Coronary artery disease     COVID-19 04/25/2022    COVID-19 virus infection    Gastro-esophageal reflux disease without esophagitis     Chronic GERD    Heart disease     Hyperlipidemia     Hypertension     Joint pain     Limited mobility     Nontoxic multinodular goiter     Multinodular goiter    Overweight 03/04/2022    Overweight    Personal history of other diseases of the digestive system 01/17/2019    History of gastroesophageal reflux (GERD)    Personal history of other diseases of the musculoskeletal system and connective  tissue     History of osteopenia    Personal history of other diseases of the nervous system and sense organs     History of hearing loss    Personal history of other endocrine, nutritional and metabolic disease     History of hyperlipidemia    Personal history of other infectious and parasitic diseases     History of herpes zoster    Pneumonia, unspecified organism     Community acquired pneumonia    Spondylosis without myelopathy or radiculopathy, cervical region     Osteoarthritis of cervical spine    TIA (transient ischemic attack)     Urinary incontinence     Use of cane as ambulatory aid     Wears glasses        Past Surgical History:   Procedure Laterality Date    BLADDER SURGERY      BREAST LUMPECTOMY  09/28/2017    Left Breast Lumpectomy    BUNIONECTOMY      CARDIAC CATHETERIZATION Left 02/23/2024    Procedure: Left Heart Cath, With LV;  Surgeon: Manfred Fitzpatrick MD;  Location: [facility];  Service: Cardiovascular;  Laterality: Left;  Left    CHOLECYSTECTOMY  09/28/2017    Cholecystectomy    COLONOSCOPY      CORONARY ANGIOPLASTY      CORONARY STENT PLACEMENT      CT HEAD ANGIO W AND WO IV CONTRAST  04/10/2019    CT HEAD ANGIO W AND WO IV CONTRAST 4/10/2019 [facility]    FOOT SURGERY      KNEE ARTHROSCOPY W/ DEBRIDEMENT  09/28/2017    Arthroscopy Knee Right    MR HEAD ANGIO WO IV CONTRAST  09/05/2019    MR HEAD ANGIO WO IV CONTRAST 9/5/2019 [facility]    MR HEAD ANGIO WO IV CONTRAST  01/19/2020    MR HEAD ANGIO WO IV CONTRAST 1/19/2020 [facility]    MR NECK ANGIO WO IV CONTRAST  01/19/2020    MR NECK ANGIO WO IV CONTRAST 1/19/2020 [facility]    OTHER SURGICAL HISTORY  09/28/2017    Surgery Foot Amputation Metatarsal And Toe    OTHER SURGICAL HISTORY  09/07/2022    Loop recorder insertion    THYROID SURGERY         Social History     Tobacco Use    Smoking status: Former     Current packs/day: 0.00     Average packs/day: 1 pack/day for 10.0 years (10.0 ttl  pk-yrs)     Types: Cigarettes     Start date:      Quit date:      Years since quittin.1    Smokeless tobacco: Never   Vaping Use    Vaping status: Never Used   Substance Use Topics    Alcohol use: Never    Drug use: Never       Family History   Problem Relation Name Age of Onset    Arthritis Mother      Heart failure Mother      Esophageal cancer Mother      Rheumatic fever Mother      Other (heart problem) Mother      Esophageal cancer Father      Other (cardiac disorder) Son         Allergies   Allergen Reactions    Prednisone Psychosis        Current Outpatient Medications   Medication Instructions    acetaminophen (Tylenol 8 HOUR) 650 mg ER tablet 2 tablets, Daily    aspirin 81 mg EC tablet 1 tablet, Daily    benzocaine-menthol lozenge 1 lozenge, Every 4 hours PRN    bisacodyl (DULCOLAX) 10 mg, Daily PRN    cyclobenzaprine (FLEXERIL) 5 mg, 3 times daily PRN    diphenhydrAMINE (BENADRYL) 12.5 mg, Every 6 hours PRN    ergocalciferol (Vitamin D-2) 1.25 MG (91091 UT) capsule 1 capsule, Once Weekly    ferrous sulfate, 325 mg ferrous sulfate, tablet 1 tablet, Daily with breakfast    hydroCHLOROthiazide (MICROZIDE) 12.5 mg, oral, Daily    ibuprofen 200 mg tablet 2 tablets, Every 6 hours PRN    ipratropium-albuteroL (Duo-Neb) 0.5-2.5 mg/3 mL nebulizer solution 3 mL, nebulization, Daily PRN    isosorbide mononitrate ER (Imdur) 30 mg 24 hr tablet TAKE ONE TABLET (30MG DOSE) BY MOUTH DAILY    magnesium oxide (Mag-Ox) 400 mg (241.3 mg magnesium) tablet 1 tablet, Daily    meloxicam (MOBIC) 7.5 mg, oral, Daily PRN    metoprolol tartrate (Lopressor) 25 mg tablet 1 tablet, Daily    mirtazapine (REMERON) 15 mg, oral, Nightly    montelukast (SINGULAIR) 10 mg, oral, Nightly    multivit-min/ferrous fumarate (MULTI VITAMIN ORAL) 1 tablet, Daily    naloxone (Narcan) 4 mg/0.1 mL nasal spray Instill 1 spray intranasally for opioid overdose; repeat in 5 minutes if no response.    nitroglycerin (Nitrostat) 0.4 mg SL tablet  1 tablet, Every 5 min PRN    omeprazole (PRILOSEC) 20 mg, oral, Daily before breakfast, Do not crush or chew.    oxygen (O2) 4 L/min, inhalation, Every 24 hours, PRN    potassium chloride CR 10 mEq ER tablet 10 mEq, oral, Daily    sertraline (ZOLOFT) 50 mg, oral, Daily        Relevant reports:     8/2/2024 Heart Cath   CONCLUSIONS:   1. Left Main Coronary Artery: This artery is normal.   2. Left Anterior Descending Artery: presents luminal irregularities and contains patent previously placed stents.   3. Circumflex Coronary Artery: presents luminal irregularities.   4. Right Coronary Artery: significantly obstructed and fills via collaterals.   5. Mid RCA Lesion: The percent stenosis is 100%.   6. The Left Ventricular Ejection Fraction is 55%.    11/11/2019 Heart Cath   CONCLUSIONS:   1. #1 right dominant system      The right coronary artery arises from the right sinus of Valsalva. There is diffuse disease extending all living up to the ostium, then 100% mid occlusion which appears chronic, with CECIL 1-2 antegrade flow by collaterals, grade 3 left to right flow by collaterals to the distal RCA. There is extensive calcification throughout the proximal and mid RCA. The distal mid RCA has a 95% stenosis as well. The posterolateral ventricular branch and the PDA fills suboptimally by antegrade flow, but filled well via retrograde flow and are moderately large in size. Plan for this lesion is medical management.       #2Left main coronary artery arises from the left sinus of Valsalva. It divides into the left anterior descending artery and the left circumflex artery.       #3The left anterior descending artery is a large vessel that comes around the left ventricular apex. There is moderate calcification in the proximal and mid left anterior descending artery.      First major diagonal branch is relatively small, the second major diagonal branch is large, with less than 10% stenosis. At the origin of the second and major  diagonal branch there is a sharp bend in the left anterior descending artery, and within the bend there is at least 50% to 70 smooth stenosis which appears focal. Mid left anterior descending artery in the distal segment has less than 10% stenosis, the distal left anterior descending artery has less than 10% stenosis.       Left circumflex artery is nondominant, has moderate proximal calcification, continues as a major obtuse marginal branch, the proximal vessel has about 20% stenosis and distal obtuse marginal branch has less than 10% stenosis. The AV groove left circumflex artery stops at the origin of the first major obtuse marginal branch.   Overall visually estimated left ventricular ejection fraction is about 50%. There is mild apical hypokinesis. There is no appreciable mitral regurgitation. Left ventricular end-diastolic pressure is about 5 mmHg, no systolic gradient across the aortic valve.     1/28/21 Nuclear stress test  CONCLUSIONS:    1. SPECT Perfusion Study: Normal.    2. There is no scintigraphic evidence for inducible ischemia.    3. No evidence of scarred myocardium.    4. Left ventricle is small. The left ventricle systolic function is hyperdynamic.    5. This is a low risk scan.  LVEF % 83     11/11/2019 ECHO  CONCLUSIONS:   1. The left ventricular systolic function is normal with a 55-60% estimated ejection fraction.   2. Spectral Doppler shows an impaired relaxation pattern of left ventricular diastolic filling.   3. There is mild asymmetric left ventricular hypertrophy.   4. There is moderate thickening and calcification of the posterior mitral valve leaflet.   5. Aortic valve sclerosis.      Patient Active Problem List   Diagnosis    Villeda esophagus    Benign essential hypertension    CAD S/P percutaneous coronary angioplasty    COPD (chronic obstructive pulmonary disease) (Multi)    Cough, persistent    Dementia    Hypokalemia    Lumbar degenerative disc disease    Mixed hyperlipidemia     OAB (overactive bladder)    Paroxysmal atrial fibrillation (Multi)    Sciatic neuropathy    Senile osteoporosis    Status post placement of implantable loop recorder    Tachycardia-bradycardia syndrome (Multi)    TIA (transient ischemic attack)    Vitamin D insufficiency    Intracranial aneurysm (HHS-HCC)    Hypoxia    Right knee DJD    Grief    Constipation    Steroid-induced hyperglycemia    Former smoker    Unilateral primary osteoarthritis, right knee    BMI 30.0-30.9,adult    Encounter for medication review and counseling    Encounter to discuss treatment options    DANIELLE (iron deficiency anemia)    Near syncope    Encounter for loop recorder at end of battery life    Immunodeficiency due to conditions classified elsewhere (Multi)    Acquired absence of other left toe(s) (Multi)    Acute respiratory failure with hypoxia (Multi)    Hypertensive heart disease with heart failure    History of home oxygen therapy    S/P total knee replacement, right    Impaired functional mobility, balance, gait, and endurance    Pain    Combined immunodeficiency, unspecified        ASSESSMENT AND PLAN  Problem List Items Addressed This Visit    None       ERIC Natarajan, PA-C

## 2025-03-04 ENCOUNTER — APPOINTMENT (OUTPATIENT)
Dept: CARDIOLOGY | Facility: CLINIC | Age: 80
End: 2025-03-04
Payer: MEDICARE

## 2025-03-04 ENCOUNTER — APPOINTMENT (OUTPATIENT)
Dept: CARDIOLOGY | Facility: HOSPITAL | Age: 80
End: 2025-03-04
Payer: MEDICARE

## 2025-03-04 ENCOUNTER — PATIENT MESSAGE (OUTPATIENT)
Dept: CARDIOLOGY | Facility: CLINIC | Age: 80
End: 2025-03-04

## 2025-03-04 DIAGNOSIS — I20.9 ANGINA PECTORIS, UNSPECIFIED: ICD-10-CM

## 2025-03-04 DIAGNOSIS — I10 BENIGN ESSENTIAL HYPERTENSION: ICD-10-CM

## 2025-03-04 DIAGNOSIS — E87.6 HYPOKALEMIA: ICD-10-CM

## 2025-03-04 RX ORDER — ISOSORBIDE MONONITRATE 30 MG/1
30 TABLET, EXTENDED RELEASE ORAL DAILY
Qty: 30 TABLET | Refills: 0 | Status: SHIPPED | OUTPATIENT
Start: 2025-03-04 | End: 2025-04-03

## 2025-03-04 RX ORDER — POTASSIUM CHLORIDE 750 MG/1
10 TABLET, EXTENDED RELEASE ORAL DAILY
Qty: 30 TABLET | Refills: 0 | Status: SHIPPED | OUTPATIENT
Start: 2025-03-04 | End: 2025-04-03

## 2025-03-06 ENCOUNTER — PATIENT MESSAGE (OUTPATIENT)
Dept: PRIMARY CARE | Facility: CLINIC | Age: 80
End: 2025-03-06
Payer: MEDICARE

## 2025-03-06 DIAGNOSIS — M81.0 SENILE OSTEOPOROSIS: Primary | ICD-10-CM

## 2025-03-07 RX ORDER — ERGOCALCIFEROL 1.25 MG/1
1 CAPSULE ORAL
Qty: 12 CAPSULE | Refills: 3 | Status: SHIPPED | OUTPATIENT
Start: 2025-03-09 | End: 2026-03-09

## 2025-03-08 DIAGNOSIS — E87.6 HYPOKALEMIA: ICD-10-CM

## 2025-03-10 RX ORDER — POTASSIUM CHLORIDE 750 MG/1
10 TABLET, EXTENDED RELEASE ORAL DAILY
Qty: 30 TABLET | Refills: 0 | Status: SHIPPED | OUTPATIENT
Start: 2025-03-10

## 2025-03-10 NOTE — TELEPHONE ENCOUNTER
Received request for prescription refills for patient.   Patient follows with Dr. Matute    Request is for Potassium chloride  Is patient currently on medication yes    Last OV 3/1/2024  Next OV with Adriana HOLGUIN, 3/31/2025    Pended for signing and sent to provider

## 2025-03-11 DIAGNOSIS — M62.830 SPASM OF BACK MUSCLES: ICD-10-CM

## 2025-03-12 RX ORDER — MELOXICAM 7.5 MG/1
7.5 TABLET ORAL DAILY PRN
Qty: 30 TABLET | Refills: 3 | Status: SHIPPED | OUTPATIENT
Start: 2025-03-12 | End: 2026-03-12

## 2025-03-14 ENCOUNTER — HOSPITAL ENCOUNTER (OUTPATIENT)
Dept: RADIOLOGY | Facility: HOSPITAL | Age: 80
Discharge: HOME | End: 2025-03-14
Payer: MEDICARE

## 2025-03-14 DIAGNOSIS — M81.0 AGE-RELATED OSTEOPOROSIS WITHOUT CURRENT PATHOLOGICAL FRACTURE: ICD-10-CM

## 2025-03-14 PROCEDURE — 77080 DXA BONE DENSITY AXIAL: CPT

## 2025-03-16 PROBLEM — R05.3 COUGH, PERSISTENT: Status: RESOLVED | Noted: 2023-10-23 | Resolved: 2025-03-16

## 2025-03-16 PROBLEM — Z99.81 HISTORY OF HOME OXYGEN THERAPY: Status: RESOLVED | Noted: 2024-03-22 | Resolved: 2025-03-16

## 2025-03-16 PROBLEM — R09.02 HYPOXIA: Status: RESOLVED | Noted: 2023-10-23 | Resolved: 2025-03-16

## 2025-03-16 PROBLEM — I49.5 TACHYCARDIA-BRADYCARDIA SYNDROME (MULTI): Status: RESOLVED | Noted: 2023-10-23 | Resolved: 2025-03-16

## 2025-03-16 PROBLEM — Z71.89 ENCOUNTER FOR MEDICATION REVIEW AND COUNSELING: Status: RESOLVED | Noted: 2024-03-01 | Resolved: 2025-03-16

## 2025-03-16 PROBLEM — R73.9 STEROID-INDUCED HYPERGLYCEMIA: Status: RESOLVED | Noted: 2024-02-03 | Resolved: 2025-03-16

## 2025-03-16 PROBLEM — J96.01 ACUTE RESPIRATORY FAILURE WITH HYPOXIA: Status: RESOLVED | Noted: 2024-03-12 | Resolved: 2025-03-16

## 2025-03-16 PROBLEM — T38.0X5A STEROID-INDUCED HYPERGLYCEMIA: Status: RESOLVED | Noted: 2024-02-03 | Resolved: 2025-03-16

## 2025-03-16 PROBLEM — E87.6 HYPOKALEMIA: Status: RESOLVED | Noted: 2023-10-23 | Resolved: 2025-03-16

## 2025-03-16 PROBLEM — Z71.89 ENCOUNTER TO DISCUSS TREATMENT OPTIONS: Status: RESOLVED | Noted: 2024-03-01 | Resolved: 2025-03-16

## 2025-03-16 PROBLEM — R52 PAIN: Status: RESOLVED | Noted: 2024-03-31 | Resolved: 2025-03-16

## 2025-03-16 PROBLEM — Z45.09 ENCOUNTER FOR LOOP RECORDER AT END OF BATTERY LIFE: Status: RESOLVED | Noted: 2024-03-01 | Resolved: 2025-03-16

## 2025-03-16 PROBLEM — D84.81 IMMUNODEFICIENCY DUE TO CONDITIONS CLASSIFIED ELSEWHERE (MULTI): Status: RESOLVED | Noted: 2024-03-12 | Resolved: 2025-03-16

## 2025-03-16 PROBLEM — D81.9 COMBINED IMMUNODEFICIENCY, UNSPECIFIED: Status: RESOLVED | Noted: 2025-01-09 | Resolved: 2025-03-16

## 2025-03-16 ASSESSMENT — ENCOUNTER SYMPTOMS
PALPITATIONS: 0
LIGHT-HEADEDNESS: 0
CHEST TIGHTNESS: 0
EYE REDNESS: 0
HEMATURIA: 0
ACTIVITY CHANGE: 0
FEVER: 0
JOINT SWELLING: 0
BLOOD IN STOOL: 0
SPEECH DIFFICULTY: 0
STRIDOR: 0

## 2025-03-16 NOTE — PROGRESS NOTES
"CHIEF COMPLAINT:   Annual Wellness Checkup       HISTORY OF PRESENT ILLNESS:   Rae Lundy comes for annual medical check up.  No acute medical complaint today. Overall doing well. . Chronic medical conditions are stable with current medical management. Cognitive function is assessed. Immunizations are age appropriate. Home medications have been reconciled. The healthy lifestyle has been reinforced. Encouraged continued avoidance of tobacco, alcohol, poly pharmacy and substances. Functional capacity has been assessed. Discussed about fall risk and safety measures, at home and outside.  Age appropriate cancer screening tests were recommended. Reminded about code status and health care Power of  (POA). Discussed about mental health and wellbeing. The depression screening questionnaire  (PHQ 9) was discussed for 5 mins. Vital signs, recent lab results, imaging studies were reviewed. At the end patient raised the concern about osteoporosis, Alzheimer's dementia, COPD exacerbation.  A complete physical exams was performed to evaluate those conditions.    Review of Systems   Constitutional:  Negative for activity change and fever.   HENT:  Negative for hearing loss, nosebleeds and tinnitus.    Eyes:  Negative for redness.   Respiratory:  Negative for chest tightness and stridor.    Cardiovascular:  Negative for chest pain, palpitations and leg swelling.   Gastrointestinal:  Negative for blood in stool.   Endocrine: Negative for cold intolerance.   Genitourinary:  Negative for hematuria.   Musculoskeletal:  Negative for joint swelling.   Skin:  Negative for rash.   Neurological:  Negative for speech difficulty and light-headedness.   Psychiatric/Behavioral:  Negative for behavioral problems.      Visit Vitals  /84 (BP Location: Left arm, Patient Position: Sitting, BP Cuff Size: Large adult)   Pulse 75   Temp 36.1 °C (97 °F) (Temporal)   Ht 1.626 m (5' 4\")   Wt 72.4 kg (159 lb 9.6 oz)   SpO2 95% Comment: RA "   BMI 27.40 kg/m²   OB Status Postmenopausal   Smoking Status Former   BSA 1.81 m²        Wt Readings from Last 10 Encounters:   01/30/25 71.7 kg (158 lb)   01/23/25 72.3 kg (159 lb 6.4 oz)   01/09/25 72.4 kg (159 lb 9.6 oz)   11/20/24 77.6 kg (171 lb)   10/02/24 61.7 kg (136 lb)   04/03/24 82.1 kg (181 lb)   03/29/24 83.9 kg (185 lb)   03/22/24 81.6 kg (179 lb 14.3 oz)   03/13/24 81.6 kg (180 lb)   03/12/24 82 kg (180 lb 12.8 oz)        Physical Exam  Constitutional:       General: She is not in acute distress.     Appearance: Normal appearance.   HENT:      Head: Normocephalic.      Right Ear: Tympanic membrane normal.      Left Ear: Tympanic membrane normal.      Mouth/Throat:      Mouth: Mucous membranes are moist.   Cardiovascular:      Rate and Rhythm: Normal rate and regular rhythm.      Heart sounds: No murmur heard.  Pulmonary:      Effort: No respiratory distress.   Abdominal:      Palpations: Abdomen is soft.   Musculoskeletal:      Cervical back: Neck supple.      Right lower leg: No edema.      Left lower leg: No edema.   Skin:     Findings: No rash.   Neurological:      General: No focal deficit present.      Mental Status: She is alert and oriented to person, place, and time.   Psychiatric:         Mood and Affect: Mood normal.        RECENT LABS:  Lab Results   Component Value Date    WBC 7.0 01/23/2025    HGB 13.9 01/23/2025    HCT 42.5 01/23/2025     01/23/2025    CHOL 219 (H) 01/23/2025    TRIG 148 01/23/2025    HDL 63.6 01/23/2025    ALT 10 01/23/2025    AST 17 01/23/2025     01/23/2025    K 4.1 01/23/2025     01/23/2025    CREATININE 0.48 (L) 01/23/2025    BUN 11 01/23/2025    CO2 31 01/23/2025    TSH 1.67 01/23/2025    INR 1.0 03/08/2024    HGBA1C 6.4 (H) 03/08/2024     Lab Results   Component Value Date    GLUCOSE 99 01/23/2025    CALCIUM 9.6 01/23/2025     01/23/2025    K 4.1 01/23/2025    CO2 31 01/23/2025     01/23/2025    BUN 11 01/23/2025    CREATININE 0.48  (L) 01/23/2025      Lab Results   Component Value Date    LDLCALC 126 (H) 01/23/2025     Lab Results   Component Value Date    HGBA1C 6.4 (H) 03/08/2024    HGBA1C 6.5 (H) 02/03/2024    HGBA1C 6.4 (H) 02/23/2022     Lab Results   Component Value Date    LDLCALC 126 (H) 01/23/2025    CREATININE 0.48 (L) 01/23/2025     MEDICATIONS:   Current Outpatient Medications   Medication Instructions    acetaminophen (Tylenol 8 HOUR) 650 mg ER tablet 2 tablets, Daily    aspirin 81 mg EC tablet 1 tablet, Daily    benzocaine-menthol lozenge 1 lozenge, Every 4 hours PRN    bisacodyl (DULCOLAX) 10 mg, Daily PRN    cyclobenzaprine (FLEXERIL) 5 mg, 3 times daily PRN    diphenhydrAMINE (BENADRYL) 12.5 mg, Every 6 hours PRN    ergocalciferol (VITAMIN D-2) 1,250 mcg, oral, Once Weekly, Monday    ferrous sulfate, 325 mg ferrous sulfate, tablet 1 tablet, Daily with breakfast    hydroCHLOROthiazide (MICROZIDE) 12.5 mg, oral, Daily    ibuprofen 200 mg tablet 2 tablets, Every 6 hours PRN    ipratropium-albuteroL (Duo-Neb) 0.5-2.5 mg/3 mL nebulizer solution 3 mL, nebulization, Daily PRN    isosorbide mononitrate ER (IMDUR) 30 mg, oral, Daily, Do not crush or chew.    magnesium oxide (Mag-Ox) 400 mg (241.3 mg magnesium) tablet 1 tablet, Daily    meloxicam (MOBIC) 7.5 mg, oral, Daily PRN    metoprolol tartrate (Lopressor) 25 mg tablet 1 tablet, Daily    mirtazapine (REMERON) 15 mg, oral, Nightly    montelukast (SINGULAIR) 10 mg, oral, Nightly    multivit-min/ferrous fumarate (MULTI VITAMIN ORAL) 1 tablet, Daily    naloxone (Narcan) 4 mg/0.1 mL nasal spray Instill 1 spray intranasally for opioid overdose; repeat in 5 minutes if no response.    nitroglycerin (Nitrostat) 0.4 mg SL tablet 1 tablet, Every 5 min PRN    omeprazole (PRILOSEC) 20 mg, oral, Daily before breakfast, Do not crush or chew.    oxygen (O2) 4 L/min, inhalation, Every 24 hours, PRN    potassium chloride CR 10 mEq ER tablet 10 mEq, oral, Daily    sertraline (ZOLOFT) 50 mg, oral,  Daily        TODAY'S VISIT  DX:     1. Encounter for subsequent annual wellness visit (AWV) in Medicare patient  Overall health is stable and at base line. Will continue with current medical therapy and plan.  Immunizations, cancer screening tests are age appropriately up to date.      2. Chronic diastolic heart failure  CBC and Auto Differential      3. Alzheimer's dementia without behavioral disturbance, psychotic disturbance, mood disturbance, or anxiety, unspecified dementia severity, unspecified timing of dementia onset (Multi)  Still able to function without any major memory impairment.  Will think about Aricept sometime later.      4. COPD exacerbation (Multi)  Comprehensive Metabolic Panel      5. Age-related osteoporosis without current pathological fracture  XR DEXA bone density    Vitamin D 25-Hydroxy,Total (for eval of Vitamin D levels)      6. Need for immunization against influenza  Flu vaccine, trivalent, preservative free, HIGH-DOSE, age 65y+ (Fluzone)      7. Screening for thyroid disorder  Triiodothyronine, Free    TSH with reflex to Free T4 if abnormal      8. Screening for blood or protein in urine  Urinalysis with Reflex Microscopic      9. Mixed hyperlipidemia  Lipid Panel             MEDICAL DECISION MAKING:   - Relevant correspondence/notes from specialty consultants were reviewed.  - Active co morbidities conditions are stable for now.    - Cognitive function stable.    - Immunizations are age appropriately up to date.   - Preventative cancer screening tests are up-to-date.    - F/U in 3 months      PS: This note was completed using Dragon voice recognition technology and may include unintended errors with respect to translation of words, typographical errors or grammar errors which may not have been identified while finalizing the chart.

## 2025-03-18 ENCOUNTER — TELEPHONE (OUTPATIENT)
Dept: CARDIOLOGY | Facility: CLINIC | Age: 80
End: 2025-03-18
Payer: MEDICARE

## 2025-03-18 NOTE — TELEPHONE ENCOUNTER
Spoke to patient's son regarding the appointment and device check originally scheduled for 03/31. I informed him to due a recent schedule change we had to move the appointment and device check to a different day. We moved the appointment to be on 04/22 920am device check and 10am appointment.

## 2025-03-28 NOTE — PROGRESS NOTES
"Rae Lundy, pleasant 80 y.o. female was seen today for:      Chief Complaint   Patient presents with    Follow-up     1 week follow-up. Patient advises that her R leg is swollen and hurts in groin.        Rae Lundy   Patient was seen a week ago.  There is a concern from the family members that patient may be having altered mental status.  Extensive workup was done including CT scan of the brain.  Blood work and urine test.  Nothing really found objectively.  Patient does have a history of forgetfulness but more so she is very depressed.  She is lonely.  She lost her  and does not have much .  She is somewhat depressed and anxious.  At this time she is agreeing to get on any kind of antianxiety and depression medicine.  She is here in the office with her daughter-in-law.      MEDICAL DECISION MAKING:  - Current co morbidities have been considered.   - All pertinent labs and images were addressed as per medical necessity.    - Also reviewed relevant notes from the specialty consultants.     - Time spent before, during and after office visit, which includes coordination of care counseling was 35 minutes  - Next follow up : 2 months    TODAY'S VISIT  DX:     1. Anxiety and depression  sertraline (Zoloft) 50 mg tablet         Visit Vitals  /70 (BP Location: Right arm, Patient Position: Sitting, BP Cuff Size: Adult) Comment (BP Location): Lower   Pulse 71   Temp 36.9 °C (98.4 °F) (Temporal)   Ht 1.626 m (5' 4\")   Wt 71.7 kg (158 lb)   SpO2 96% Comment: RA   BMI 27.12 kg/m²   OB Status Postmenopausal   Smoking Status Former   BSA 1.8 m²     Physical Exam  Constitutional:       General: She is not in acute distress.     Appearance: Normal appearance.   HENT:      Head: Normocephalic.      Right Ear: Tympanic membrane normal.      Left Ear: Tympanic membrane normal.      Mouth/Throat:      Mouth: Mucous membranes are moist.   Cardiovascular:      Rate and Rhythm: Normal rate and regular rhythm. "      Heart sounds: No murmur heard.  Pulmonary:      Effort: No respiratory distress.   Abdominal:      Palpations: Abdomen is soft.   Musculoskeletal:      Cervical back: Neck supple.      Right lower leg: No edema.      Left lower leg: No edema.   Skin:     Findings: No rash.   Neurological:      General: No focal deficit present.      Mental Status: She is alert and oriented to person, place, and time.   Psychiatric:         Mood and Affect: Mood normal.         Wt Readings from Last 10 Encounters:   01/30/25 71.7 kg (158 lb)   01/23/25 72.3 kg (159 lb 6.4 oz)   01/09/25 72.4 kg (159 lb 9.6 oz)   11/20/24 77.6 kg (171 lb)   10/02/24 61.7 kg (136 lb)   04/03/24 82.1 kg (181 lb)   03/29/24 83.9 kg (185 lb)   03/22/24 81.6 kg (179 lb 14.3 oz)   03/13/24 81.6 kg (180 lb)   03/12/24 82 kg (180 lb 12.8 oz)         MEDICATIONS:   Current Outpatient Medications   Medication Instructions    acetaminophen (Tylenol 8 HOUR) 650 mg ER tablet 2 tablets, Daily    aspirin 81 mg EC tablet 1 tablet, Daily    diphenhydrAMINE (BENADRYL) 12.5 mg, Every 6 hours PRN    ergocalciferol (VITAMIN D-2) 1,250 mcg, oral, Once Weekly, Monday    ferrous sulfate, 325 mg ferrous sulfate, tablet 1 tablet, Daily with breakfast    hydroCHLOROthiazide (MICROZIDE) 12.5 mg, oral, Daily    ibuprofen 200 mg tablet 2 tablets, Every 6 hours PRN    ipratropium-albuteroL (Duo-Neb) 0.5-2.5 mg/3 mL nebulizer solution 3 mL, nebulization, Daily PRN    isosorbide mononitrate ER (IMDUR) 30 mg, oral, Daily, Do not crush or chew.    magnesium oxide (Mag-Ox) 400 mg (241.3 mg magnesium) tablet 1 tablet, Daily    meloxicam (MOBIC) 7.5 mg, oral, Daily PRN    metoprolol tartrate (Lopressor) 25 mg tablet 1 tablet, Daily    mirtazapine (REMERON) 15 mg, oral, Nightly    montelukast (SINGULAIR) 10 mg, oral, Nightly    multivit-min/ferrous fumarate (MULTI VITAMIN ORAL) 1 tablet, Daily    nitroglycerin (Nitrostat) 0.4 mg SL tablet 1 tablet, Every 5 min PRN    omeprazole  (PRILOSEC) 20 mg, oral, Daily before breakfast, Do not crush or chew.    oxygen (O2) 4 L/min, inhalation, Every 24 hours, PRN    potassium chloride CR 10 mEq ER tablet 10 mEq, oral, Daily    sertraline (ZOLOFT) 50 mg, oral, Daily       Review of Systems   Constitutional:  No activity change or fever   HENT:  Denies ringing ears or nose bleed   Respiratory:  Denies stridor. No blood in sputum   Cardiovascular:  Denies chest pain, no sudden excessive sweating   Gastrointestinal:  No sour burping, no blood in stool    Genitourinary:  Denies blood in urine    Musculoskeletal:  No joint redness or swelling    Skin:  No new spot changing color or shape or border    Neurological:  No speech difficulty, facial droop    Psychiatric/Behavioral:  No agitation, denies Hallucination     RECENT LABS:  Lab Results   Component Value Date    WBC 7.0 01/23/2025    HGB 13.9 01/23/2025    HCT 42.5 01/23/2025     01/23/2025    CHOL 219 (H) 01/23/2025    TRIG 148 01/23/2025    HDL 63.6 01/23/2025    ALT 10 01/23/2025    AST 17 01/23/2025     01/23/2025    K 4.1 01/23/2025     01/23/2025    CREATININE 0.48 (L) 01/23/2025    BUN 11 01/23/2025    CO2 31 01/23/2025    TSH 1.67 01/23/2025    INR 1.0 03/08/2024    HGBA1C 6.4 (H) 03/08/2024     Lab Results   Component Value Date    GLUCOSE 99 01/23/2025    CALCIUM 9.6 01/23/2025     01/23/2025    K 4.1 01/23/2025    CO2 31 01/23/2025     01/23/2025    BUN 11 01/23/2025    CREATININE 0.48 (L) 01/23/2025      Lab Results   Component Value Date    LDLCALC 126 (H) 01/23/2025     Lab Results   Component Value Date    HGBA1C 6.4 (H) 03/08/2024    HGBA1C 6.5 (H) 02/03/2024    HGBA1C 6.4 (H) 02/23/2022     Lab Results   Component Value Date    LDLCALC 126 (H) 01/23/2025    CREATININE 0.48 (L) 01/23/2025           P.S: This note was completed using Dragon voice recognition technology and may include unintended errors with respect to translation of words, typography or  grammatical errors. They may not have been identified while finalizing the chart.

## 2025-03-31 ENCOUNTER — APPOINTMENT (OUTPATIENT)
Dept: CARDIOLOGY | Facility: CLINIC | Age: 80
End: 2025-03-31
Payer: MEDICARE

## 2025-03-31 ENCOUNTER — APPOINTMENT (OUTPATIENT)
Dept: CARDIOLOGY | Facility: HOSPITAL | Age: 80
End: 2025-03-31
Payer: MEDICARE

## 2025-04-18 NOTE — PROGRESS NOTES
Electrophysiology Office Visit     CHIEF COMPLAINT  Chief Complaint   Patient presents with    Follow-up     1 year with device check - overdue      Primary EP doctor: Dr Matute  Primary Cardiologist: Dr Fitzpatrick    EP History: pAF w/ RVR 11/2019, pSVT dx 2021 s/p LOOP, not on OAC 2/2 h/o UGI bleed & declined Watchman, hx TIA (2019)  6/5/2019 TIA (L sided facial and arm parasthesias)  11/10/19-11/13/19 Admit for AF w/ RVR, NSTEMI, Heart cath demonstrated 100% mid occlusion of RCA which fills via collaterals with normal irregularities of the circumflex and normal left main coronary artery. Medical management  11/21/2019 LOOP recorder placed (pSVT and pauses noted throughout loop history especially in 2021)  1/23/2020 LAD YAYA  2/2023 Admit 2/2 COPD exacerbation and SVT, increased BB  Controlled on Metoprolol tartrate 25mg BID, MgOx  400mg daily. No OAC 2/2 UGI bleed; (EGD 10/29/2019-Villeda's esophagus, hiatal hernia, small duodenal ulcer, gastritis with bile reflux, and duodenal angiodysplasias. )    HPI: 4/22/2025  80 year old female pAF w/ RVR 11/2019, pSVT dx 2021. LOOP (11/2019), not on OAC 2/2 h/o UGI bleed & declined Watchman, hx TIA (2019). Here for loop device check but it's out of battery since 9/7/22.   She is here with her son and is looking for refills. She denies any cardiac complaints.     Today's EKG Interpretation: Sinus chase, rate 57bpm, pr 186ms, qrs 72ms, qtc 439ms    VITALS  Vitals:    04/22/25 0948   BP: 126/68   Pulse: 57     Wt Readings from Last 4 Encounters:   04/22/25 71.7 kg (158 lb)   01/30/25 71.7 kg (158 lb)   01/23/25 72.3 kg (159 lb 6.4 oz)   01/09/25 72.4 kg (159 lb 9.6 oz)     PHYSICAL EXAM:  GENERAL:  Well developed, well nourished, in no acute distress.  NEURO/PSYCH:  No focal deficits. A&O x 3   LUNGS:  Clear to auscultation bilaterally. No wheezing, rhonci, or crackles  HEART:  BRADYCARDIA, no M/R/G.   EXTREMITIES:  Warm with good color, no clubbing or cyanosis.  No pitting edema.      Medical History[1]  Surgical History[2]  Social History[3]  Family History[4]  RX Allergies[5]   Current Outpatient Medications   Medication Instructions    acetaminophen (Tylenol 8 HOUR) 650 mg ER tablet 2 tablets, Daily    aspirin 81 mg EC tablet 1 tablet, Daily    diphenhydrAMINE (BENADRYL) 12.5 mg, Every 6 hours PRN    ergocalciferol (VITAMIN D-2) 1,250 mcg, oral, Once Weekly, Monday    ferrous sulfate, 325 mg ferrous sulfate, tablet 1 tablet, Daily with breakfast    hydroCHLOROthiazide (MICROZIDE) 12.5 mg, oral, Daily    ibuprofen 200 mg tablet 2 tablets, Every 6 hours PRN    ipratropium-albuteroL (Duo-Neb) 0.5-2.5 mg/3 mL nebulizer solution 3 mL, nebulization, Daily PRN    isosorbide mononitrate ER (IMDUR) 30 mg, oral, Daily, Do not crush or chew.    magnesium oxide (Mag-Ox) 400 mg (241.3 mg magnesium) tablet 1 tablet, Daily    meloxicam (MOBIC) 7.5 mg, oral, Daily PRN    metoprolol tartrate (LOPRESSOR) 25 mg, oral, 2 times daily    mirtazapine (REMERON) 15 mg, oral, Nightly    montelukast (SINGULAIR) 10 mg, oral, Nightly    multivit-min/ferrous fumarate (MULTI VITAMIN ORAL) 1 tablet, Daily    nitroglycerin (Nitrostat) 0.4 mg SL tablet 1 tablet, Every 5 min PRN    omeprazole (PRILOSEC) 20 mg, oral, Daily before breakfast, Do not crush or chew.    oxygen (O2) 4 L/min, inhalation, Every 24 hours, PRN    potassium chloride CR 10 mEq ER tablet 10 mEq, oral, Daily    sertraline (ZOLOFT) 50 mg, oral, Daily      Relevant reports:   2/23/2024 Heart Cath  1. Left Main Coronary Artery: This artery is normal.   2. Left Anterior Descending Artery: presents luminal irregularities and contains patent previously placed stents.   3. Circumflex Coronary Artery: presents luminal irregularities.   4. Right Coronary Artery: significantly obstructed and fills via collaterals.   5. Mid RCA Lesion: The percent stenosis is 100%.   6. The Left Ventricular Ejection Fraction is 55%.    1/23/2020 Heart Cath  1. Left Anterior  Descending Artery: is significantly obstructed.   2. Proximal LAD Lesion: The percent stenosis is 75%.   3. Proximal LAD Lesion: pre-dilation, Resolute [place] 3.00x12 post-dilation : 0% residual stenosis. LAD: pre-procedure CECIL flow was 3(complete perfusion), post-procedure CECIL flow was 3(complete perfusion) and the lesion risk category is High/C.    11/11/2019 Heart Cath  Double vessel disease. Maximize medical therapy.    1/28/2021 Nuclear Stress Test   1.  Normal.   2. There is no scintigraphic evidence for inducible ischemia.   3. No evidence of scarred myocardium.   4. Left ventricle is small. The left ventricle systolic function is hyperdynamic.   5. This is a low risk scan.   LVEF % 83    1/20/2020 Nuclear Stress Test (had LAD stent placed on 1/23/20)  1. Findings are concerning for a moderate to large territory of moderate to severe inferior ischemia involving the basal through distal inferior wall which normalizes at rest without evidence of prior infarction in this territory.   4. Normal LV wall motion with an LV EF estimated at greater than 65%     11/11/2019 ECHO   1. The left ventricular systolic function is normal with a 55-60% estimated ejection fraction.   2. Spectral Doppler shows an impaired relaxation pattern of left ventricular diastolic filling.   3. There is mild asymmetric left ventricular hypertrophy.   4. There is moderate thickening and calcification of the posterior mitral valve leaflet.   5. Aortic valve sclerosis.    2/22/2021 Carotid duplex: B/L ICA 1-15%     Problem List[6]     ASSESSMENT AND PLAN  Problem List Items Addressed This Visit       Benign essential hypertension    Hypokalemia    Relevant Medications    potassium chloride CR 10 mEq ER tablet    Paroxysmal atrial fibrillation (Multi)  Controlled on Metoprolol tartrate and MgOx  No anticoagulation since low burden and hx of GI bleeds. Refuses Watchman.   Follow up with Dr Matute in 11 months    Relevant Medications    metoprolol  tartrate (Lopressor) 25 mg tablet    isosorbide mononitrate ER (Imdur) 30 mg 24 hr tablet    Other Relevant Orders    ECG 12 lead (Clinic Performed)    Follow Up In Cardiology    Angina pectoris, unspecified    Relevant Medications    metoprolol tartrate (Lopressor) 25 mg tablet    isosorbide mononitrate ER (Imdur) 30 mg 24 hr tablet    Encounter for loop recorder at end of battery life  Battery has been out of service since 9/7/2022. She elects to not have it removed.     Paroxysmal SVT (supraventricular tachycardia) (CMS-HCC) - Primary  Controlled on Metoprolol tartrate and MgOx  Follow up with Dr Matute in 11 months    Relevant Medications    metoprolol tartrate (Lopressor) 25 mg tablet    isosorbide mononitrate ER (Imdur) 30 mg 24 hr tablet        [facility]ana Ruelas [facility] [place] [place]             [1]   Past Medical History:  Diagnosis Date    Acute candidiasis of vulva and vagina 10/22/2018    Yeast infection of the vagina    Anemia     Anxiety     Anxiety disorder, unspecified     Anxiety and depression    Arrhythmia     Arthritis     Atrial fibrillation (Multi)     Body mass index (BMI) 29.0-29.9, adult 03/04/2022    BMI 29.0-29.9,adult    Bunion of left foot     Bunion, left    Candidiasis of skin and nail 10/22/2018    Yeast infection of the skin    COPD (chronic obstructive pulmonary disease) (Multi)     Coronary artery disease     COVID-19 04/25/2022    COVID-19 virus infection    Gastro-esophageal reflux disease without esophagitis     Chronic GERD    Heart disease     Hyperlipidemia     Hypertension     Joint pain     Limited mobility     Nontoxic multinodular goiter     Multinodular goiter    Overweight 03/04/2022    Overweight    Personal history of other diseases of the digestive system 01/17/2019    History of gastroesophageal reflux (GERD)    Personal history of other diseases of the musculoskeletal system and connective tissue     History of osteopenia    Personal history of other diseases of the nervous system  and sense organs     History of hearing loss    Personal history of other endocrine, nutritional and metabolic disease     History of hyperlipidemia    Personal history of other infectious and parasitic diseases     History of herpes zoster    Pneumonia, unspecified organism     Community acquired pneumonia    Spondylosis without myelopathy or radiculopathy, cervical region     Osteoarthritis of cervical spine    TIA (transient ischemic attack)     Urinary incontinence     Use of cane as ambulatory aid     Wears glasses    [2]   Past Surgical History:  Procedure Laterality Date    BLADDER SURGERY      BREAST LUMPECTOMY  09/28/2017    Left Breast Lumpectomy    BUNIONECTOMY      CARDIAC CATHETERIZATION Left 02/23/2024    Procedure: Left Heart Cath, With LV;  Surgeon: Manfred Fitzpatrick MD;  Location: [facility];  Service: Cardiovascular;  Laterality: Left;  Left    CHOLECYSTECTOMY  09/28/2017    Cholecystectomy    COLONOSCOPY      CORONARY ANGIOPLASTY      CORONARY STENT PLACEMENT      CT HEAD ANGIO W AND WO IV CONTRAST  04/10/2019    CT HEAD ANGIO W AND WO IV CONTRAST 4/10/2019 [facility]    FOOT SURGERY      KNEE  03/2024    KNEE ARTHROSCOPY W/ DEBRIDEMENT  09/28/2017    Arthroscopy Knee Right    MR HEAD ANGIO WO IV CONTRAST  09/05/2019    MR HEAD ANGIO WO IV CONTRAST 9/5/2019 [facility]    MR HEAD ANGIO WO IV CONTRAST  01/19/2020    MR HEAD ANGIO WO IV CONTRAST 1/19/2020 [facility]    MR NECK ANGIO WO IV CONTRAST  01/19/2020    MR NECK ANGIO WO IV CONTRAST 1/19/2020 [facility]GAC[facility]    OTHER SURGICAL HISTORY  09/28/2017    Surgery Foot Amputation Metatarsal And Toe    OTHER SURGICAL HISTORY  09/07/2022    Loop recorder insertion    THYROID SURGERY     [3]   Social History  Tobacco Use    Smoking status: Former     Current packs/day: 0.00     Average packs/day: 1 pack/day for 10.0 years (10.0 ttl pk-yrs)     Types: Cigarettes     Start date: 1990     Quit date: 2000     Years  since quittin.3    Smokeless tobacco: Never   Vaping Use    Vaping status: Never Used   Substance Use Topics    Alcohol use: Never    Drug use: Never   [4]   Family History  Problem Relation Name Age of Onset    Arthritis Mother      Heart failure Mother      Esophageal cancer Mother      Rheumatic fever Mother      Other (heart problem) Mother      Esophageal cancer Father      Other (cardiac disorder) Son     [5]   Allergies  Allergen Reactions    Prednisone Psychosis   [6]   Patient Active Problem List  Diagnosis    Villeda esophagus    Benign essential hypertension    CAD S/P percutaneous coronary angioplasty    COPD (chronic obstructive pulmonary disease) (Multi)    Dementia    Hypokalemia    Lumbar degenerative disc disease    Mixed hyperlipidemia    OAB (overactive bladder)    Paroxysmal atrial fibrillation (Multi)    Sciatic neuropathy    Senile osteoporosis    Status post placement of implantable loop recorder    TIA (transient ischemic attack)    Vitamin D insufficiency    Intracranial aneurysm ([facility])    Right knee DJD    Grief    Constipation    Former smoker    Unilateral primary osteoarthritis, right knee    BMI 30.0-30.9,adult    Angina pectoris, unspecified    DANIELLE (iron deficiency anemia)    Near syncope    Encounter for loop recorder at end of battery life    Acquired absence of other left toe(s) (Multi)    Hypertensive heart disease with heart failure    S/P total knee replacement, right    Impaired functional mobility, balance, gait, and endurance    Paroxysmal SVT (supraventricular tachycardia) (CMS-[facility])

## 2025-04-22 ENCOUNTER — APPOINTMENT (OUTPATIENT)
Dept: CARDIOLOGY | Facility: CLINIC | Age: 80
End: 2025-04-22
Payer: MEDICARE

## 2025-04-22 ENCOUNTER — HOSPITAL ENCOUNTER (OUTPATIENT)
Dept: CARDIOLOGY | Facility: HOSPITAL | Age: 80
Discharge: HOME | End: 2025-04-22
Payer: MEDICARE

## 2025-04-22 VITALS
HEART RATE: 57 BPM | DIASTOLIC BLOOD PRESSURE: 68 MMHG | BODY MASS INDEX: 26.98 KG/M2 | WEIGHT: 158 LBS | HEIGHT: 64 IN | SYSTOLIC BLOOD PRESSURE: 126 MMHG

## 2025-04-22 DIAGNOSIS — I48.0 PAROXYSMAL ATRIAL FIBRILLATION (MULTI): ICD-10-CM

## 2025-04-22 DIAGNOSIS — I47.10 PAROXYSMAL SVT (SUPRAVENTRICULAR TACHYCARDIA) (CMS-HCC): Primary | ICD-10-CM

## 2025-04-22 DIAGNOSIS — E87.6 HYPOKALEMIA: ICD-10-CM

## 2025-04-22 DIAGNOSIS — I10 BENIGN ESSENTIAL HYPERTENSION: ICD-10-CM

## 2025-04-22 DIAGNOSIS — Z95.818 STATUS POST PLACEMENT OF IMPLANTABLE LOOP RECORDER: ICD-10-CM

## 2025-04-22 DIAGNOSIS — I20.9 ANGINA PECTORIS, UNSPECIFIED: ICD-10-CM

## 2025-04-22 DIAGNOSIS — Z45.09 ENCOUNTER FOR LOOP RECORDER AT END OF BATTERY LIFE: ICD-10-CM

## 2025-04-22 RX ORDER — ISOSORBIDE MONONITRATE 30 MG/1
30 TABLET, EXTENDED RELEASE ORAL DAILY
Qty: 30 TABLET | Refills: 11 | Status: SHIPPED | OUTPATIENT
Start: 2025-04-22 | End: 2026-04-17

## 2025-04-22 RX ORDER — METOPROLOL TARTRATE 25 MG/1
25 TABLET, FILM COATED ORAL 2 TIMES DAILY
Qty: 60 TABLET | Refills: 11 | Status: SHIPPED | OUTPATIENT
Start: 2025-04-22 | End: 2026-04-22

## 2025-04-22 RX ORDER — POTASSIUM CHLORIDE 750 MG/1
10 TABLET, EXTENDED RELEASE ORAL DAILY
Qty: 30 TABLET | Refills: 11 | Status: SHIPPED | OUTPATIENT
Start: 2025-04-22

## 2025-04-22 NOTE — PATIENT INSTRUCTIONS
Great to see you today.   Please take your medications and or do life style behavior modifications as discussed.   Please make appointment in 11 months with Dr Matute.  Please call if you have any questions or concerns.  Please go to emergency department if you have abrupt onset of chest, shortness of breath, light headedness or dizziness.

## 2025-06-19 DIAGNOSIS — M62.830 SPASM OF BACK MUSCLES: ICD-10-CM

## 2025-06-19 RX ORDER — MELOXICAM 7.5 MG/1
7.5 TABLET ORAL DAILY PRN
Qty: 30 TABLET | Refills: 3 | Status: SHIPPED | OUTPATIENT
Start: 2025-06-19 | End: 2026-06-19

## 2025-07-10 ENCOUNTER — APPOINTMENT (OUTPATIENT)
Dept: PRIMARY CARE | Facility: CLINIC | Age: 80
End: 2025-07-10
Payer: MEDICARE

## 2025-07-10 VITALS
HEIGHT: 64 IN | SYSTOLIC BLOOD PRESSURE: 142 MMHG | BODY MASS INDEX: 27.12 KG/M2 | OXYGEN SATURATION: 90 % | TEMPERATURE: 97.2 F | DIASTOLIC BLOOD PRESSURE: 68 MMHG | HEART RATE: 60 BPM

## 2025-07-10 DIAGNOSIS — E58 INADEQUATE INTAKE OF CALCIUM AND VITAMIN D: ICD-10-CM

## 2025-07-10 DIAGNOSIS — Z13.89 SCREENING FOR BLOOD OR PROTEIN IN URINE: ICD-10-CM

## 2025-07-10 DIAGNOSIS — E55.9 INADEQUATE INTAKE OF CALCIUM AND VITAMIN D: ICD-10-CM

## 2025-07-10 DIAGNOSIS — I10 BENIGN ESSENTIAL HYPERTENSION: ICD-10-CM

## 2025-07-10 DIAGNOSIS — Z13.220 SCREENING FOR LIPID DISORDERS: ICD-10-CM

## 2025-07-10 DIAGNOSIS — Z13.29 SCREENING FOR THYROID DISORDER: ICD-10-CM

## 2025-07-10 DIAGNOSIS — M17.11 PRIMARY OSTEOARTHRITIS OF RIGHT KNEE: ICD-10-CM

## 2025-07-10 DIAGNOSIS — F01.53 VASCULAR DEMENTIA WITH MOOD DISTURBANCE, UNSPECIFIED DEMENTIA SEVERITY: Primary | ICD-10-CM

## 2025-07-10 PROBLEM — K59.00 CONSTIPATION: Status: RESOLVED | Noted: 2024-02-02 | Resolved: 2025-07-10

## 2025-07-10 PROCEDURE — 3077F SYST BP >= 140 MM HG: CPT | Performed by: INTERNAL MEDICINE

## 2025-07-10 PROCEDURE — 3078F DIAST BP <80 MM HG: CPT | Performed by: INTERNAL MEDICINE

## 2025-07-10 PROCEDURE — 1125F AMNT PAIN NOTED PAIN PRSNT: CPT | Performed by: INTERNAL MEDICINE

## 2025-07-10 PROCEDURE — 1160F RVW MEDS BY RX/DR IN RCRD: CPT | Performed by: INTERNAL MEDICINE

## 2025-07-10 PROCEDURE — 1159F MED LIST DOCD IN RCRD: CPT | Performed by: INTERNAL MEDICINE

## 2025-07-10 PROCEDURE — G2211 COMPLEX E/M VISIT ADD ON: HCPCS | Performed by: INTERNAL MEDICINE

## 2025-07-10 PROCEDURE — 99214 OFFICE O/P EST MOD 30 MIN: CPT | Performed by: INTERNAL MEDICINE

## 2025-07-10 ASSESSMENT — PAIN SCALES - GENERAL: PAINLEVEL_OUTOF10: 6

## 2025-07-10 ASSESSMENT — PATIENT HEALTH QUESTIONNAIRE - PHQ9
SUM OF ALL RESPONSES TO PHQ9 QUESTIONS 1 AND 2: 0
2. FEELING DOWN, DEPRESSED OR HOPELESS: NOT AT ALL
1. LITTLE INTEREST OR PLEASURE IN DOING THINGS: NOT AT ALL

## 2025-07-14 DIAGNOSIS — F32.A ANXIETY AND DEPRESSION: ICD-10-CM

## 2025-07-14 DIAGNOSIS — F41.9 ANXIETY AND DEPRESSION: ICD-10-CM

## 2025-07-14 RX ORDER — SERTRALINE HYDROCHLORIDE 50 MG/1
50 TABLET, FILM COATED ORAL DAILY
Qty: 90 TABLET | Refills: 3 | Status: SHIPPED | OUTPATIENT
Start: 2025-07-14 | End: 2026-07-14

## 2025-07-14 NOTE — PROGRESS NOTES
Subjective     Patient ID: Rae Lundy is a 80 y.o. female who presents for Follow-up (6 month).  History of Present Illness  The patient presents for a 6-month follow-up.    She reports experiencing discomfort in her right knee, which has been progressively worsening over the past six months. She underwent a total knee replacement surgery in 03/2024, performed by Dr. Stone. Post-surgery, she has been experiencing persistent swelling in the knee, which has not subsided. This has led to instability while walking, necessitating the use of a walker. Despite using the walker, she continues to experience pain. She is open to the idea of physical therapy if it could alleviate her symptoms. She also mentions that she did not complete her rehabilitation as recommended.    She has noticed an increase in forgetfulness recently. Her mood is generally low, with feelings of sadness due to the loss of her parents and . However, she reports no issues with sleep or rest. She is able to perform her daily activities independently, including preparing her own meals, doing laundry, and gardening. She no longer drives but is able to collect her mail independently. She does not wander off and has not gotten lost. Her appetite is good, and she enjoys her meals. She experiences sundowning symptoms. She typically goes to bed at 9 PM and wakes up around 8 AM. She does not get agitated unless she receives bills that she does not like. She engages in activities such as reading the Bible, doing word searches and crosswords, and sewing.    She had her last mammogram in 2022 and is unsure about continuing with annual mammograms. She does not require any medication refills at this time. She recently had a consultation with her cardiologist.    PAST SURGICAL HISTORY:  Total knee replacement surgery in 03/2024.    FAMILY HISTORY  She reports no family history of breast cancer.    Objective   Vitals:    07/10/25 1054   BP: 142/68   BP  "Location: Left arm   Patient Position: Sitting   BP Cuff Size: Adult   Pulse: 60   Temp: 36.2 °C (97.2 °F)   TempSrc: Temporal   SpO2: 90%   Height: 1.626 m (5' 4\")     Wt Readings from Last 10 Encounters:   04/22/25 71.7 kg (158 lb)   01/30/25 71.7 kg (158 lb)   01/23/25 72.3 kg (159 lb 6.4 oz)   01/09/25 72.4 kg (159 lb 9.6 oz)   11/20/24 77.6 kg (171 lb)   10/02/24 61.7 kg (136 lb)   04/03/24 82.1 kg (181 lb)   03/29/24 83.9 kg (185 lb)   03/22/24 81.6 kg (179 lb 14.3 oz)   03/13/24 81.6 kg (180 lb)       Medication:  Current Outpatient Medications   Medication Instructions    acetaminophen (Tylenol 8 HOUR) 650 mg ER tablet 2 tablets, Daily    aspirin 81 mg EC tablet 1 tablet, Daily    diphenhydrAMINE (BENADRYL) 12.5 mg, Every 6 hours PRN    ergocalciferol (VITAMIN D-2) 1,250 mcg, oral, Once Weekly, Monday    ferrous sulfate, 325 mg ferrous sulfate, tablet 1 tablet, Daily with breakfast    hydroCHLOROthiazide (MICROZIDE) 12.5 mg, oral, Daily    ibuprofen 200 mg tablet 2 tablets, Every 6 hours PRN    ipratropium-albuteroL (Duo-Neb) 0.5-2.5 mg/3 mL nebulizer solution 3 mL, nebulization, Daily PRN    isosorbide mononitrate ER (IMDUR) 30 mg, oral, Daily, Do not crush or chew.    magnesium oxide (Mag-Ox) 400 mg (241.3 mg magnesium) tablet 1 tablet, Daily    meloxicam (MOBIC) 7.5 mg, oral, Daily PRN    metoprolol tartrate (LOPRESSOR) 25 mg, oral, 2 times daily    mirtazapine (REMERON) 15 mg, oral, Nightly    montelukast (SINGULAIR) 10 mg, oral, Nightly    multivit-min/ferrous fumarate (MULTI VITAMIN ORAL) 1 tablet, Daily    nitroglycerin (Nitrostat) 0.4 mg SL tablet 1 tablet, Every 5 min PRN    omeprazole (PRILOSEC) 20 mg, oral, Daily before breakfast, Do not crush or chew.    oxygen (O2) 4 L/min, inhalation, Every 24 hours, PRN    potassium chloride CR 10 mEq ER tablet 10 mEq, oral, Daily    sertraline (ZOLOFT) 50 mg, oral, Daily     Physical Exam  General: Alert and oriented, no acute distress.  Neurological: No " focal deficits noted.  Extremities: Swelling noted in the right knee.  Musculoskeletal: Weakness in quadriceps and hamstring muscles noted.  HE ENT: No pallor, No carotid bruit,  No thyromegaly   Lungs:  Clear to auscultation without rales, rhonchi, or rub.  Heart:  RRR, S1, S2, No murmur   Abd: No epigastric tenderness, No CVA tenderness   Skin:  No rash, ecchymosis or erythema.      Review of Systems:  Respiratory:  Denies stridor. No blood in sputum   Cardiovascular:  Denies chest pain, no sudden excessive sweating   Gastrointestinal:  Denies sour burping, no blood in stool    Genitourinary:  Denies blood in urine    Skin:  No new spot changing color or shape or border    Neurological: Denies speech difficulty, no memory disturbance        Recent Labs:   Lab Results   Component Value Date    WBC 7.0 01/23/2025    HGB 13.9 01/23/2025    HCT 42.5 01/23/2025     01/23/2025    CHOL 219 (H) 01/23/2025    TRIG 148 01/23/2025    HDL 63.6 01/23/2025    ALT 10 01/23/2025    AST 17 01/23/2025     01/23/2025    K 4.1 01/23/2025     01/23/2025    CREATININE 0.48 (L) 01/23/2025    BUN 11 01/23/2025    CO2 31 01/23/2025    TSH 1.67 01/23/2025    INR 1.0 03/08/2024    HGBA1C 6.4 (H) 03/08/2024     Lab Results   Component Value Date    GLUCOSE 99 01/23/2025    CALCIUM 9.6 01/23/2025     01/23/2025    K 4.1 01/23/2025    CO2 31 01/23/2025     01/23/2025    BUN 11 01/23/2025    CREATININE 0.48 (L) 01/23/2025      Lab Results   Component Value Date    LDLCALC 126 (H) 01/23/2025     Lab Results   Component Value Date    HGBA1C 6.4 (H) 03/08/2024    HGBA1C 6.5 (H) 02/03/2024    HGBA1C 6.4 (H) 02/23/2022     Lab Results   Component Value Date    LDLCALC 126 (H) 01/23/2025    CREATININE 0.48 (L) 01/23/2025       Diagnosis and Orders:     Vascular dementia with mood disturbance, unspecified dementia severity  -     Comprehensive Metabolic Panel; Future  Primary osteoarthritis of right knee  -     Referral  to Physical Therapy; Future  Benign essential hypertension  -     CBC and Auto Differential; Future  Screening for lipid disorders  -     Lipid Panel; Future  Screening for thyroid disorder  -     Triiodothyronine, Free; Future  -     TSH with reflex to Free T4 if abnormal; Future  Inadequate intake of calcium and vitamin D  -     Vitamin D 25-Hydroxy,Total (for eval of Vitamin D levels); Future  Screening for blood or protein in urine  -     Urinalysis with Reflex Microscopic; Future         Assessment & Plan  1. Right knee pain.  - Reports worsening right knee pain over the past six months, following a total knee replacement surgery in 03/2024.  - Knee remains swollen, and experiences instability, requiring the use of a walker.  - Previous follow-ups with Dr. Stone indicated that the hardware was in place, but continues to experience pain.  - Referral for physical therapy has been made to strengthen the muscles around the knee. Advised to continue weight-bearing as tolerated and to follow up with x-rays if pain increases.    2. Mild vascular dementia.  - Exhibits increased forgetfulness but manages day-to-day activities well.  - Does not drive anymore but engages in activities like getting the mail, pulling weeds, and doing laundry.  - Experiences some sundowning symptoms but is not agitated.  - No new medications will be introduced at this time to avoid complicating current regimen. Encouraged to continue mental exercises such as reading the Bible, memorizing verses, and doing crossword puzzles.    3. Health maintenance.  - Last mammogram conducted in 2022, and all previous results have been normal.  - Blood work orders are in the system for future labs, which can be done at convenience.  - Advised to perform self-breast exams during showers or baths and to report any abnormalities.              This medical note was created with the assistance of artificial intelligence (AI) for documentation purposes. The  content has been reviewed and confirmed by the healthcare provider for accuracy and completeness. Patient consented to the use of audio recording and use of AI during their visit.

## 2026-01-14 ENCOUNTER — APPOINTMENT (OUTPATIENT)
Dept: PRIMARY CARE | Facility: CLINIC | Age: 81
End: 2026-01-14
Payer: MEDICARE

## 2026-03-24 ENCOUNTER — APPOINTMENT (OUTPATIENT)
Dept: CARDIOLOGY | Facility: CLINIC | Age: 81
End: 2026-03-24
Payer: MEDICARE

## (undated) DEVICE — BLADE, GEN COATED 2.75, LF

## (undated) DEVICE — SUTURE ETHLN SZ 4-0 L18IN NONABSORBABLE BLK L19MM PS-2 3/8 1667H

## (undated) DEVICE — COVER LT HNDL BLU PLAS

## (undated) DEVICE — WOUND SYSTEM, DEBRIDEMENT & CLEANING, O.R DUOPAK

## (undated) DEVICE — NEEDLE HYPO 25GA L1.5IN BVL ORIENTED ECLIPSE

## (undated) DEVICE — SUTURE, VICRYL, 1, 36 IN, V-34, VIOLET

## (undated) DEVICE — PIN, BONE, 4MM X 110MM, STERILE

## (undated) DEVICE — STRAP, ARM BOARD, 32 X 1.5

## (undated) DEVICE — Device

## (undated) DEVICE — 3M™ SYNTHETIC CAST STOCKINET, MS04, 4 INCH X 25 YARD (10.1CM X 22.8M), 1 ROLL/CASE: Brand: 3M™

## (undated) DEVICE — BATH BLANKET STERILE

## (undated) DEVICE — SYRINGE, 50 CC, LUER LOCK

## (undated) DEVICE — IMMOBILIZER, KNEE, 19IN, ADJUSTABLE FOAM, W/ ELASTIC STRAPS

## (undated) DEVICE — TUBING, MANIFOLD, LOW PRESSURE

## (undated) DEVICE — TIBIAL CHECKPOINT, STERILE

## (undated) DEVICE — CHLORAPREP 26ML ORANGE

## (undated) DEVICE — SOLUTION, INJECTION, USP, SODIUM CHLORIDE 0.9%, .9, NACL, 1000 ML, BAG

## (undated) DEVICE — HOOD, SURGICAL, FLYTE, T7

## (undated) DEVICE — BLADE, MAKO, SAGITTAL, STANDARD

## (undated) DEVICE — DISCONTINUED USE 394504 SYRINGE LUER LOCK TIP 12 ML

## (undated) DEVICE — SUTURE, MONOCRYL, 4-0, 27 IN, PS-2, UNDYED

## (undated) DEVICE — DRESSING PETRO W7.6XL7.6CM CELOS ACETT NONADHERING MESH

## (undated) DEVICE — SUTURE, MONOCRYL, 3-0, 36 IN, CT-1, UNDYED

## (undated) DEVICE — PREP, IODOPHOR, W/ALCOHOL, DURAPREP, W/APPLICATOR, 26 CC

## (undated) DEVICE — NEEDLE, SAFETY, 18 G X 1.5 IN

## (undated) DEVICE — ADHESIVE, SKIN, LIQUIBAND EXCEED

## (undated) DEVICE — DRAPE SURG EXT FEN REINF ST W O FLD PCH STD

## (undated) DEVICE — GUIDEWIRE, TIGERWIRE, FLOPPY, ANGLED, 150CM

## (undated) DEVICE — MANIFOLD, 4 PORT NEPTUNE STANDARD

## (undated) DEVICE — PIN, BONE, 4MM X 140MM, STERILE

## (undated) DEVICE — COVER, MAYO STAND, W/PAD, 23 IN, DISPOSABLE, PLASTIC, LF, STERILE

## (undated) DEVICE — DRESSING, MEPILEX BORDER, POST-OP AG, 4 X 12 IN

## (undated) DEVICE — CATHETER, OPTITORQUE, 5FR, JACKY, 3.5/ 2H/110CM, CURVED

## (undated) DEVICE — TRACKING KIT, TM KNEE PROCEDURES, VIZADISC

## (undated) DEVICE — STRAP, VELCRO, BODY, 4 X 60IN, NS

## (undated) DEVICE — GAUZE SPONGES,USP TYPE VII GAUZE, 12 PLY: Brand: CURITY

## (undated) DEVICE — TOWEL PACK 10-PK

## (undated) DEVICE — BLADE, PATELLA REAMER, W/PILOT HOLE, 29MM

## (undated) DEVICE — BANDAGE,GAUZE,BULKEE II,4.5"X4.1YD,STRL: Brand: MEDLINE

## (undated) DEVICE — CUFF, TOURNIQUET, DUAL PORT, SNG BLADDER, 30 IN, PLC

## (undated) DEVICE — PADDING, CAST, SPECIALIST, 6 IN X 4 YD, STERILE

## (undated) DEVICE — GAUZE,SPONGE,4"X4",16PLY,XRAY,STRL,LF: Brand: MEDLINE

## (undated) DEVICE — SOLUTION, IRRIGATION, STERILE WATER, 1000 ML, POUR BOTTLE

## (undated) DEVICE — BANDAGE, ELASTIC, 6 X 10YD, BEIGE, LF

## (undated) DEVICE — TUBING, IRRIGATION, HIGH FLOW, HAND PIECE SET

## (undated) DEVICE — CEMENT, MIXEVAC III, 10S BOWL, KNEES

## (undated) DEVICE — WRAP, DEMAYO, LEG, STERILE

## (undated) DEVICE — ELECTRODE PT RET AD L9FT HI MOIST COND ADH HYDRGEL CORDED

## (undated) DEVICE — SUTURE VCRL SZ 4-0 L27IN ABSRB UD L19MM FS-2 3/8 CIR REV J422H

## (undated) DEVICE — INTENDED FOR TISSUE SEPARATION, AND OTHER PROCEDURES THAT REQUIRE A SHARP SURGICAL BLADE TO PUNCTURE OR CUT.: Brand: BARD-PARKER ® CARBON RIB-BACK BLADES

## (undated) DEVICE — BAND, VASCULAR, RADIAL HEMOSTAT, REGULAR 24CM

## (undated) DEVICE — SHEATH, GLIDESHEATH, SLENDER, 6FR 10CM

## (undated) DEVICE — GLOVE, SURGICAL, PROTEXIS PI , 7.5, PF, LF

## (undated) DEVICE — DRAIN, PENROSE, 5/8 X 12IN,  LF

## (undated) DEVICE — GLOVE SURG SZ 7 L12IN FNGR THK94MIL TRNSLUC YEL LTX HYDRGEL